# Patient Record
Sex: FEMALE | Race: WHITE | NOT HISPANIC OR LATINO | Employment: FULL TIME | URBAN - METROPOLITAN AREA
[De-identification: names, ages, dates, MRNs, and addresses within clinical notes are randomized per-mention and may not be internally consistent; named-entity substitution may affect disease eponyms.]

---

## 2018-08-27 ENCOUNTER — HOSPITAL ENCOUNTER (EMERGENCY)
Facility: HOSPITAL | Age: 38
Discharge: HOME | End: 2018-08-27
Attending: EMERGENCY MEDICINE
Payer: COMMERCIAL

## 2018-08-27 VITALS
BODY MASS INDEX: 53.92 KG/M2 | TEMPERATURE: 99 F | HEART RATE: 95 BPM | RESPIRATION RATE: 18 BRPM | WEIGHT: 293 LBS | DIASTOLIC BLOOD PRESSURE: 77 MMHG | SYSTOLIC BLOOD PRESSURE: 183 MMHG | OXYGEN SATURATION: 96 % | HEIGHT: 62 IN

## 2018-08-27 DIAGNOSIS — R10.9 ABDOMINAL PAIN, UNSPECIFIED ABDOMINAL LOCATION: Primary | ICD-10-CM

## 2018-08-27 LAB
ALBUMIN SERPL BCP-MCNC: 3.9 G/DL
ALP SERPL-CCNC: 119 U/L
ALT SERPL W/O P-5'-P-CCNC: 33 U/L
ANION GAP SERPL CALC-SCNC: 13 MMOL/L
AST SERPL-CCNC: 24 U/L
B-HCG UR QL: NEGATIVE
BACTERIA #/AREA URNS AUTO: ABNORMAL /HPF
BASOPHILS # BLD AUTO: 0.07 K/UL
BASOPHILS NFR BLD: 0.5 %
BILIRUB SERPL-MCNC: 0.4 MG/DL
BILIRUB UR QL STRIP: NEGATIVE
BUN SERPL-MCNC: 8 MG/DL (ref 6–30)
BUN SERPL-MCNC: 9 MG/DL
CALCIUM SERPL-MCNC: 9.5 MG/DL
CHLORIDE SERPL-SCNC: 101 MMOL/L (ref 95–110)
CHLORIDE SERPL-SCNC: 102 MMOL/L
CLARITY UR REFRACT.AUTO: CLEAR
CO2 SERPL-SCNC: 23 MMOL/L
COLOR UR AUTO: YELLOW
CREAT SERPL-MCNC: 0.5 MG/DL (ref 0.5–1.4)
CREAT SERPL-MCNC: 0.7 MG/DL
CTP QC/QA: YES
DIFFERENTIAL METHOD: ABNORMAL
EOSINOPHIL # BLD AUTO: 0.2 K/UL
EOSINOPHIL NFR BLD: 1.1 %
ERYTHROCYTE [DISTWIDTH] IN BLOOD BY AUTOMATED COUNT: 17.1 %
EST. GFR  (AFRICAN AMERICAN): >60 ML/MIN/1.73 M^2
EST. GFR  (NON AFRICAN AMERICAN): >60 ML/MIN/1.73 M^2
GLUCOSE SERPL-MCNC: 163 MG/DL
GLUCOSE SERPL-MCNC: 171 MG/DL (ref 70–110)
GLUCOSE UR QL STRIP: ABNORMAL
HCT VFR BLD AUTO: 45.4 %
HCT VFR BLD CALC: 46 %PCV (ref 36–54)
HGB BLD-MCNC: 14.8 G/DL
HGB UR QL STRIP: ABNORMAL
IMM GRANULOCYTES # BLD AUTO: 0.06 K/UL
IMM GRANULOCYTES NFR BLD AUTO: 0.4 %
KETONES UR QL STRIP: ABNORMAL
LEUKOCYTE ESTERASE UR QL STRIP: NEGATIVE
LIPASE SERPL-CCNC: 20 U/L
LYMPHOCYTES # BLD AUTO: 2.1 K/UL
LYMPHOCYTES NFR BLD: 14.6 %
MCH RBC QN AUTO: 26.2 PG
MCHC RBC AUTO-ENTMCNC: 32.6 G/DL
MCV RBC AUTO: 80 FL
MICROSCOPIC COMMENT: ABNORMAL
MONOCYTES # BLD AUTO: 0.7 K/UL
MONOCYTES NFR BLD: 4.8 %
NEUTROPHILS # BLD AUTO: 11.5 K/UL
NEUTROPHILS NFR BLD: 78.6 %
NITRITE UR QL STRIP: NEGATIVE
NRBC BLD-RTO: 0 /100 WBC
PH UR STRIP: 5 [PH] (ref 5–8)
PLATELET # BLD AUTO: 314 K/UL
PMV BLD AUTO: 11.4 FL
POC IONIZED CALCIUM: 1.07 MMOL/L (ref 1.06–1.42)
POC TCO2 (MEASURED): 25 MMOL/L (ref 23–29)
POTASSIUM BLD-SCNC: 3.9 MMOL/L (ref 3.5–5.1)
POTASSIUM SERPL-SCNC: 3.9 MMOL/L
PROT SERPL-MCNC: 8 G/DL
PROT UR QL STRIP: NEGATIVE
RBC # BLD AUTO: 5.65 M/UL
RBC #/AREA URNS AUTO: 8 /HPF (ref 0–4)
SAMPLE: ABNORMAL
SODIUM BLD-SCNC: 138 MMOL/L (ref 136–145)
SODIUM SERPL-SCNC: 138 MMOL/L
SP GR UR STRIP: 1.03 (ref 1–1.03)
SQUAMOUS #/AREA URNS AUTO: 1 /HPF
URN SPEC COLLECT METH UR: ABNORMAL
UROBILINOGEN UR STRIP-ACNC: NEGATIVE EU/DL
WBC # BLD AUTO: 14.65 K/UL
WBC #/AREA URNS AUTO: 1 /HPF (ref 0–5)
YEAST UR QL AUTO: ABNORMAL

## 2018-08-27 PROCEDURE — 83690 ASSAY OF LIPASE: CPT

## 2018-08-27 PROCEDURE — 25000003 PHARM REV CODE 250: Performed by: EMERGENCY MEDICINE

## 2018-08-27 PROCEDURE — 25000003 PHARM REV CODE 250: Performed by: PHYSICIAN ASSISTANT

## 2018-08-27 PROCEDURE — 81001 URINALYSIS AUTO W/SCOPE: CPT

## 2018-08-27 PROCEDURE — 99284 EMERGENCY DEPT VISIT MOD MDM: CPT | Mod: ,,, | Performed by: EMERGENCY MEDICINE

## 2018-08-27 PROCEDURE — 80053 COMPREHEN METABOLIC PANEL: CPT

## 2018-08-27 PROCEDURE — 63600175 PHARM REV CODE 636 W HCPCS: Performed by: PHYSICIAN ASSISTANT

## 2018-08-27 PROCEDURE — 81025 URINE PREGNANCY TEST: CPT | Performed by: PHYSICIAN ASSISTANT

## 2018-08-27 PROCEDURE — 96372 THER/PROPH/DIAG INJ SC/IM: CPT | Mod: 59

## 2018-08-27 PROCEDURE — 99284 EMERGENCY DEPT VISIT MOD MDM: CPT | Mod: 25

## 2018-08-27 PROCEDURE — 96374 THER/PROPH/DIAG INJ IV PUSH: CPT

## 2018-08-27 PROCEDURE — 85025 COMPLETE CBC W/AUTO DIFF WBC: CPT

## 2018-08-27 PROCEDURE — 96361 HYDRATE IV INFUSION ADD-ON: CPT

## 2018-08-27 RX ORDER — DICYCLOMINE HYDROCHLORIDE 20 MG/1
20 TABLET ORAL 2 TIMES DAILY
Qty: 20 TABLET | Refills: 0 | Status: SHIPPED | OUTPATIENT
Start: 2018-08-27 | End: 2018-09-26

## 2018-08-27 RX ORDER — ONDANSETRON 4 MG/1
4 TABLET, ORALLY DISINTEGRATING ORAL
Status: COMPLETED | OUTPATIENT
Start: 2018-08-27 | End: 2018-08-27

## 2018-08-27 RX ORDER — DICYCLOMINE HYDROCHLORIDE 10 MG/ML
20 INJECTION INTRAMUSCULAR
Status: COMPLETED | OUTPATIENT
Start: 2018-08-27 | End: 2018-08-27

## 2018-08-27 RX ORDER — ONDANSETRON 4 MG/1
4 TABLET, FILM COATED ORAL EVERY 6 HOURS
Qty: 12 TABLET | Refills: 0 | Status: SHIPPED | OUTPATIENT
Start: 2018-08-27 | End: 2020-03-23

## 2018-08-27 RX ORDER — KETOROLAC TROMETHAMINE 30 MG/ML
15 INJECTION, SOLUTION INTRAMUSCULAR; INTRAVENOUS
Status: COMPLETED | OUTPATIENT
Start: 2018-08-27 | End: 2018-08-27

## 2018-08-27 RX ADMIN — SODIUM CHLORIDE 1000 ML: 0.9 INJECTION, SOLUTION INTRAVENOUS at 01:08

## 2018-08-27 RX ADMIN — ONDANSETRON 4 MG: 4 TABLET, ORALLY DISINTEGRATING ORAL at 01:08

## 2018-08-27 RX ADMIN — KETOROLAC TROMETHAMINE 15 MG: 30 INJECTION, SOLUTION INTRAMUSCULAR at 02:08

## 2018-08-27 RX ADMIN — DICYCLOMINE HYDROCHLORIDE 20 MG: 20 INJECTION, SOLUTION INTRAMUSCULAR at 01:08

## 2018-08-27 RX ADMIN — ALUMINUM HYDROXIDE, MAGNESIUM HYDROXIDE, AND SIMETHICONE 50 ML: 200; 200; 20 SUSPENSION ORAL at 04:08

## 2018-08-27 RX ADMIN — ONDANSETRON 4 MG: 4 TABLET, ORALLY DISINTEGRATING ORAL at 04:08

## 2018-08-27 NOTE — ED TRIAGE NOTES
Sanjana Queen, a 38 y.o. female presents to the ED w/ complaint of upper abdominal pain and midline lower abdominal pain that started Thursday and has progressively gotten worse. States pain feels like gas pain and now is in her back. + nausea, one episode of emesis on Friday, none since. States she's had back and forth constipation vs diarrhea.     Triage note:  Chief Complaint   Patient presents with    Nausea      since thurday with diarrhea and abdominal pain that radiates to her back - similar to gas pain .  reports e. coli infection in stool x 2 weeks ago finished course of abx      Review of patient's allergies indicates:   Allergen Reactions    Doxycycline Anaphylaxis    Augmentin [amoxicillin-pot clavulanate] Other (See Comments)    Iodine and iodide containing products Itching    Sulfa (sulfonamide antibiotics) Nausea And Vomiting     Past Medical History:   Diagnosis Date    Anemia     Asthma     Cardiomyopathy, peripartum     GERD (gastroesophageal reflux disease)     Hypertension     Hypothyroidism     Morbid obesity     Snoring      Pt identifiers Sanjana Queen checked and correct  LOC: The patient is awake, alert, aware of environment with an appropriate affect. Oriented x3, speaking appropriately  APPEARANCE: Pt resting comfortably, in no acute distress, pt is clean and well groomed, clothing properly fastened  SKIN: Skin warm, dry and intact, normal skin turgor, moist mucus membranes  RESPIRATORY: Airway is open and patent, respirations are spontaneous, even and unlabored, normal effort and rate  CARDIAC: Normal rate and rhythm, no peripheral edema noted, capillary refill < 3 seconds, bilateral radial pulses 2+  ABDOMEN: Soft, nondistended. + diffuse tenderness

## 2018-08-27 NOTE — ED PROVIDER NOTES
Encounter Date: 8/27/2018       History     Chief Complaint   Patient presents with    Nausea      since thurday with diarrhea and abdominal pain that radiates to her back - similar to gas pain .  reports e. coli infection in stool x 2 weeks ago finished course of abx      Patient is an obese 38-year-old female with a history of hypertension, diabetes, GERD, anemia is presenting to the ER for evaluation of abdominal pain.  Patient states that since Thursday she has developed abdominal cramping in the upper abdomen.  She states that she has had nausea intermittently as well.  She had 1 episode of vomiting on Friday.  Patient states that she has had diarrhea an alternate constipation for the last 4 days.  No blood in stool black tarry stool at this time.  Patient states that she was recently treated with metronidazole and ciprofloxacin for bloody diarrhea.  She denies recent colonoscopy or endoscopy.  No prior abdominal surgeries otherwise.  Denies any UTI symptoms with dysuria hematuria.  Denies fevers or chills at home.  Patient denies recent hospitalization or sick contacts.  No prior history of C diff.      The history is provided by the patient.     Review of patient's allergies indicates:   Allergen Reactions    Doxycycline Anaphylaxis    Augmentin [amoxicillin-pot clavulanate] Other (See Comments)    Iodine and iodide containing products Itching    Sulfa (sulfonamide antibiotics) Nausea And Vomiting    Sulfamethoxazole-trimethoprim      Other reaction(s): nausea     Past Medical History:   Diagnosis Date    Anemia     Asthma     Cardiomyopathy, peripartum     GERD (gastroesophageal reflux disease)     Hypertension     Hypothyroidism     not at present    Morbid obesity     Snoring      Past Surgical History:   Procedure Laterality Date    BLADDER SURGERY      DILATION AND CURETTAGE OF UTERUS      FRACTURE SURGERY      spleen surgery      TONSILLECTOMY      TUBAL LIGATION       Family History    Problem Relation Age of Onset    Diabetes Mother     Diabetes Father     Hypertension Father     Heart disease Father     Hypertension Maternal Grandmother     Cancer Maternal Grandmother     Hypertension Maternal Grandfather     Heart disease Maternal Grandfather     Hypertension Sister      Social History     Tobacco Use    Smoking status: Current Every Day Smoker     Packs/day: 1.00     Start date: 3/1/1998    Smokeless tobacco: Never Used   Substance Use Topics    Alcohol use: No    Drug use: No     Review of Systems   Constitutional: Negative for chills and fever.   HENT: Negative for congestion.    Respiratory: Negative for cough and shortness of breath.    Cardiovascular: Negative for chest pain and palpitations.   Gastrointestinal: Positive for abdominal pain, constipation, diarrhea, nausea and vomiting. Negative for abdominal distention and blood in stool.   Genitourinary: Negative for dysuria, flank pain, hematuria, vaginal bleeding and vaginal discharge.   Musculoskeletal: Negative for back pain.   Skin: Negative for wound.   Allergic/Immunologic: Negative for immunocompromised state.   Neurological: Negative for dizziness, weakness and light-headedness.   Hematological: Does not bruise/bleed easily.   Psychiatric/Behavioral: Negative for confusion.       Physical Exam     Initial Vitals [08/27/18 0029]   BP Pulse Resp Temp SpO2   (!) 183/77 95 18 98.7 °F (37.1 °C) 96 %      MAP       --         Physical Exam    Vitals reviewed.  Constitutional: Vital signs are normal. She appears well-developed and well-nourished. She is not diaphoretic. No distress.   HENT:   Head: Normocephalic and atraumatic.   Mouth/Throat: Oropharynx is clear and moist.   Eyes: Conjunctivae and EOM are normal. Pupils are equal, round, and reactive to light.   Cardiovascular: Normal rate, regular rhythm, normal heart sounds and intact distal pulses.   Pulmonary/Chest: Breath sounds normal.   Abdominal: Soft. Normal  appearance. She exhibits distension. There is tenderness in the epigastric area and left upper quadrant. There is rigidity, rebound, guarding and CVA tenderness.   Morbidly obese abdomen.  Exam limited due to body habitus   Neurological: She is alert and oriented to person, place, and time.   Skin: Skin is warm and dry.         ED Course   Procedures  Labs Reviewed   ISTAT PROCEDURE - Abnormal; Notable for the following components:       Result Value    POC Glucose 171 (*)     All other components within normal limits   POCT URINE PREGNANCY - Normal   CBC W/ AUTO DIFFERENTIAL   COMPREHENSIVE METABOLIC PANEL   LIPASE   URINALYSIS, REFLEX TO URINE CULTURE   ISTAT CHEM8          Imaging Results          CT Abdomen Pelvis  Without Contrast (In process)                        APC / Resident Notes:   Patient seen in the ER promptly upon arrival. She is afebrile, no acute distress.  Physical examination revealed tenderness on palpation over the epigastric and left upper quadrant.  Abdomen soft, nondistended.  Limited examination due to body habitus.  Patient given Bentyl, Toradol and Zofran.      Laboratory studies show leukocytosis of 14,000.   Urinalysis does not show evidence of infection or blood.    CT of abdomen pelvis obtained, pending results.      Update -   Pt was signed out to me as requiring CT results. CT shows no signs of acute intraabdominal abnormality. Pt continued to complain of diffuse abdominal pain. Have given another dose of zofran, and a GI cocktail. She feels improved after the cocktail. Will discharge with zofran, bentyl, PCP follow up and instructions to make an appointment with GI. Have also given return to ED precautions. Pt agrees with the plan. Will discharge.   Esequiel Watkins PGY-4 [facility]  08/27/2018 4:50 AM           Attending Attestation:   Physician Attestation Statement for Resident:  As the supervising MD . -: PATIENT WAS NOT SEEN BY ME OR PRESENTED BY RESIDENT                       Clinical Impression:   The encounter diagnosis was Abdominal pain, unspecified abdominal location.                             Esequiel Watkins MD  Resident  08/27/18 0509       Sergei Monzon DO  08/28/18 9080

## 2018-10-24 ENCOUNTER — CLINICAL SUPPORT (OUTPATIENT)
Dept: URGENT CARE | Facility: CLINIC | Age: 38
End: 2018-10-24
Payer: COMMERCIAL

## 2018-10-24 VITALS
WEIGHT: 293 LBS | HEIGHT: 62 IN | SYSTOLIC BLOOD PRESSURE: 139 MMHG | RESPIRATION RATE: 18 BRPM | TEMPERATURE: 99 F | HEART RATE: 98 BPM | BODY MASS INDEX: 53.92 KG/M2 | DIASTOLIC BLOOD PRESSURE: 90 MMHG | OXYGEN SATURATION: 98 %

## 2018-10-24 DIAGNOSIS — H57.9 ITCHY EYES: Primary | ICD-10-CM

## 2018-10-24 PROCEDURE — 99203 OFFICE O/P NEW LOW 30 MIN: CPT | Mod: S$GLB,,, | Performed by: PHYSICIAN ASSISTANT

## 2018-10-24 RX ORDER — ASPIRIN 81 MG/1
1 TABLET ORAL DAILY
COMMUNITY
End: 2019-09-30

## 2018-10-24 RX ORDER — AMLODIPINE BESYLATE 2.5 MG/1
1 TABLET ORAL DAILY
COMMUNITY
End: 2019-01-14

## 2018-10-24 RX ORDER — IRBESARTAN 300 MG/1
1 TABLET ORAL DAILY
Refills: 0 | COMMUNITY
Start: 2018-07-23 | End: 2019-01-14

## 2018-10-24 RX ORDER — GLIPIZIDE 10 MG/1
1 TABLET, FILM COATED, EXTENDED RELEASE ORAL DAILY
COMMUNITY
End: 2019-01-14

## 2018-10-24 RX ORDER — PANTOPRAZOLE SODIUM 40 MG/1
1 TABLET, DELAYED RELEASE ORAL DAILY
COMMUNITY
End: 2019-11-18 | Stop reason: SDUPTHER

## 2018-10-24 RX ORDER — OLOPATADINE HYDROCHLORIDE 2 MG/ML
1 SOLUTION/ DROPS OPHTHALMIC DAILY
Qty: 1 BOTTLE | Refills: 0 | Status: SHIPPED | OUTPATIENT
Start: 2018-10-24 | End: 2020-05-15

## 2018-10-24 RX ORDER — ROSUVASTATIN CALCIUM 5 MG/1
TABLET, COATED ORAL
COMMUNITY
End: 2019-11-18 | Stop reason: SDUPTHER

## 2018-10-24 RX ORDER — FUROSEMIDE 40 MG/1
1 TABLET ORAL DAILY
COMMUNITY
End: 2019-01-14

## 2018-10-24 NOTE — PROGRESS NOTES
"Subjective:       Patient ID: Sanjana Queen is a 38 y.o. female.    Vitals:  height is 5' 2" (1.575 m) and weight is 145.2 kg (320 lb) (abnormal). Her oral temperature is 98.8 °F (37.1 °C). Her blood pressure is 139/90 (abnormal) and her pulse is 98. Her respiration is 18 and oxygen saturation is 98%.     Chief Complaint: Eye Problem    This is a 38 y.o. female who presents today with a chief complaint of eyes itching.  She suffers from Rosecha in the eye lid and dry eyes. She has tried using eye drops and wash with no relief.      Eye Problem    Both eyes are affected.This is a new problem. The current episode started more than 1 month ago. The problem occurs intermittently. The problem has been unchanged. There was no injury mechanism. The pain is at a severity of 0/10. The patient is experiencing no pain. There is no known exposure to pink eye. She does not wear contacts. Pertinent negatives include no blurred vision, fever, nausea, photophobia or vomiting. She has tried eye drops, commercial eye wash and water for the symptoms. The treatment provided no relief.     Review of Systems   Constitution: Negative for chills and fever.   HENT: Positive for congestion. Negative for sore throat.    Eyes: Negative for blurred vision and photophobia.        Itching to bilateral eyes   Cardiovascular: Negative for chest pain.   Respiratory: Negative for shortness of breath.    Skin: Negative for rash.   Musculoskeletal: Negative for joint pain.   Gastrointestinal: Negative for abdominal pain, diarrhea, nausea and vomiting.   Genitourinary: Negative for dysuria.   Neurological: Negative for dizziness, focal weakness and headaches.   Psychiatric/Behavioral: Negative for depression.       Objective:      Physical Exam   Constitutional: She is oriented to person, place, and time. She appears well-developed and well-nourished.   HENT:   Head: Normocephalic and atraumatic.   Right Ear: External ear normal.   Left Ear: External " ear normal.   Nose: Nose normal.   Mouth/Throat: Oropharynx is clear and moist.   Eyes: Conjunctivae, EOM and lids are normal. Pupils are equal, round, and reactive to light. Right eye exhibits no discharge. Left eye exhibits no discharge. Right conjunctiva is not injected. Left conjunctiva is not injected.   Neck: Normal range of motion. Neck supple. No thyromegaly present.   Cardiovascular: Normal rate and regular rhythm. Exam reveals no gallop and no friction rub.   No murmur heard.  Pulmonary/Chest: Effort normal and breath sounds normal. She has no wheezes. She has no rales.   Musculoskeletal: Normal range of motion.   Lymphadenopathy:     She has no cervical adenopathy.   Neurological: She is alert and oriented to person, place, and time.   Skin: Skin is warm and dry. No rash noted. No erythema.   Psychiatric: She has a normal mood and affect. Her behavior is normal. Judgment and thought content normal.   Nursing note and vitals reviewed.      Assessment:       1. Itchy eyes        Plan:         Itchy eyes  -     olopatadine (PATADAY) 0.2 % Drop; Place 1 drop into both eyes once daily.  Dispense: 1 Bottle; Refill: 0        Sanjana was seen today for eye problem.    Diagnoses and all orders for this visit:    Itchy eyes  -     olopatadine (PATADAY) 0.2 % Drop; Place 1 drop into both eyes once daily.      Patient Instructions   - Rest.    - Drink plenty of fluids.    - Tylenol or Ibuprofen as directed as needed for fever/pain.    - Follow up with your PCP or specialty clinic as directed in the next 1-2 weeks if not improved or as needed.  You can call [phone] to schedule an appointment with the appropriate provider.    - Go to the ED if your symptoms worsen.  - You must understand that you have received an Urgent Care treatment only and that you may be released before all of your medical problems are known or treated.   - You, the patient, will arrange for follow up care as instructed.   - If your  condition worsens or fails to improve we recommend that you receive another evaluation at the ER immediately or contact your PCP to discuss your concerns or return here.      Allergic Rhinitis  Allergic rhinitis is an allergic reaction that affects the nose, and often the eyes. Its often known as nasal allergies. Nasal allergies are often due to things in the environment that are breathed in. Depending what you are sensitive to, nasal allergies may occur only during certain seasons. Or they may occur year round. Common indoor allergens include house dust mites, mold, cockroaches, and pet dander. Outdoor allergens include pollen from trees, grasses, and weeds.   Symptoms include a drippy, stuffy, and itchy nose. They also include sneezing and red and itchy eyes. You may feel tired more often. Severe allergies may also affect your breathing and trigger a condition called asthma.   Tests can be done to see what allergens are affecting you. You may be referred to an allergy specialist for testing and further evaluation.  Home care  Your healthcare provider may prescribe medicines to help relieve allergy symptoms. These may include oral medicines, nasal sprays, or eye drops.  Ask your provider for advice on how to avoid substances that you are allergic to. Below are a few tips for each type of allergen.  Pet dander:  · Do not have pets with fur and feathers.  · If you can't avoid having a pet, keep it out of your bedroom and off upholstered furniture.  Pollen:  · When pollen counts are high, keep windows of your car and home closed. If possible, use an air conditioner instead.  · Wear a filter mask when mowing or doing yard work.  House dust mites:  · Wash bedding every week in warm water and detergent and dry on a hot setting.  · Cover the mattress, box spring, and pillows with allergy covers.   · If possible, sleep in a room with no carpet, curtains, or upholstered furniture.  Cockroaches:  · Store food in sealed  containers.  · Remove garbage from the home promptly.  · Fix water leaks  Mold:  · Keep humidity low by using a dehumidifier or air conditioner. Keep the dehumidifier and air conditioner clean and free of mold.  · Clean moldy areas with bleach and water.  In general:  · Vacuum once or twice a week. If possible, use a vacuum with a high-efficiency particulate air (HEPA) filter.  · Do not smoke. Avoid cigarette smoke. Cigarette smoke is an irritant that can make symptoms worse.  Follow-up care  Follow up as advised by the healthcare provider or our staff. If you were referred to an allergy specialist, make this appointment promptly.  When to seek medical advice  Call your healthcare provider right away if the following occur:  · Coughing or wheezing  · Fever greater than 100.4°F (38°C)  · Hives (raised red bumps)  · Continuing symptoms, new symptoms, or worsening symptoms  Call 911 right away if you have:  · Trouble breathing  · Severe swelling of the face or severe itching of the eyes or mouth  Date Last Reviewed: 3/1/2017  © 9838-1889 [employer]. [address] [place] [address]. All rights reserved. This information is not intended as a substitute for professional medical care. Always follow your healthcare professional's instructions.        Nasal Allergies: Related Problems  Allergies can cause nasal passages to swell. This narrows the air passages. Allergies also cause increased mucus production in the nose. These changes result in nasal allergy symptoms. Common symptoms include itching, sneezing, stuffy nose, and runny nose. Nasal allergies can also cause problems in other parts of the respiratory system. Some of the more common problems are discussed below. If you think you have any of these problems, talk to your healthcare provider about treatment choices.    Sinus infections  Fluid may be trapped in the sinuses. Bacteria may grow in trapped fluid. This causes sinus infection  (sinusitis).  Conjunctivitis  Allergens irritate your eyes, including the lining of the conjunctiva. This causes eyes to become red, itchy, puffy, and watery.  Ear problems  The eustachian tube connects the middle ear to nasal passages.  Allergies can block this tube, and make the ears feel plugged. Fluid may also build up, leading to an ear infection (otitis media).  Nasal polyps  Allergies cause nasal passages to swell. Constant swelling can lead to formation of a sac called a polyp. Polyps can grow large enough to block nasal passages.  Asthma  Asthma is inflammation and swelling of the air passages in the lungs. The symptoms are wheezing, shortness of breath, coughing, and chest tightness. Allergies, including nasal allergies, are common in people with asthma.  Date Last Reviewed: 9/1/2016  © 6760-1056 [employer]. [address] [place] [address], [place] [address]. All rights reserved. This information is not intended as a substitute for professional medical care. Always follow your healthcare professional's instructions.

## 2019-01-14 ENCOUNTER — OFFICE VISIT (OUTPATIENT)
Dept: URGENT CARE | Facility: CLINIC | Age: 39
End: 2019-01-14
Payer: COMMERCIAL

## 2019-01-14 VITALS
TEMPERATURE: 98 F | DIASTOLIC BLOOD PRESSURE: 91 MMHG | SYSTOLIC BLOOD PRESSURE: 167 MMHG | HEART RATE: 82 BPM | BODY MASS INDEX: 51.91 KG/M2 | RESPIRATION RATE: 18 BRPM | OXYGEN SATURATION: 97 % | WEIGHT: 293 LBS | HEIGHT: 63 IN

## 2019-01-14 DIAGNOSIS — M43.6 TORTICOLLIS: Primary | ICD-10-CM

## 2019-01-14 PROCEDURE — 3080F PR MOST RECENT DIASTOLIC BLOOD PRESSURE >= 90 MM HG: ICD-10-PCS | Mod: CPTII,S$GLB,, | Performed by: FAMILY MEDICINE

## 2019-01-14 PROCEDURE — 96372 THER/PROPH/DIAG INJ SC/IM: CPT | Mod: S$GLB,,, | Performed by: FAMILY MEDICINE

## 2019-01-14 PROCEDURE — 3077F PR MOST RECENT SYSTOLIC BLOOD PRESSURE >= 140 MM HG: ICD-10-PCS | Mod: CPTII,S$GLB,, | Performed by: FAMILY MEDICINE

## 2019-01-14 PROCEDURE — 3008F BODY MASS INDEX DOCD: CPT | Mod: CPTII,S$GLB,, | Performed by: FAMILY MEDICINE

## 2019-01-14 PROCEDURE — 99214 OFFICE O/P EST MOD 30 MIN: CPT | Mod: 25,S$GLB,, | Performed by: FAMILY MEDICINE

## 2019-01-14 PROCEDURE — 3080F DIAST BP >= 90 MM HG: CPT | Mod: CPTII,S$GLB,, | Performed by: FAMILY MEDICINE

## 2019-01-14 PROCEDURE — 3077F SYST BP >= 140 MM HG: CPT | Mod: CPTII,S$GLB,, | Performed by: FAMILY MEDICINE

## 2019-01-14 PROCEDURE — 3008F PR BODY MASS INDEX (BMI) DOCUMENTED: ICD-10-PCS | Mod: CPTII,S$GLB,, | Performed by: FAMILY MEDICINE

## 2019-01-14 PROCEDURE — 99214 PR OFFICE/OUTPT VISIT, EST, LEVL IV, 30-39 MIN: ICD-10-PCS | Mod: 25,S$GLB,, | Performed by: FAMILY MEDICINE

## 2019-01-14 PROCEDURE — 96372 PR INJECTION,THERAP/PROPH/DIAG2ST, IM OR SUBCUT: ICD-10-PCS | Mod: S$GLB,,, | Performed by: FAMILY MEDICINE

## 2019-01-14 RX ORDER — TRIAMCINOLONE ACETONIDE 1 MG/G
CREAM TOPICAL
Refills: 0 | COMMUNITY
Start: 2018-11-29

## 2019-01-14 RX ORDER — HYOSCYAMINE SULFATE 0.12 MG/1
TABLET SUBLINGUAL
Refills: 2 | COMMUNITY
Start: 2018-12-26 | End: 2020-03-23

## 2019-01-14 RX ORDER — KETOROLAC TROMETHAMINE 30 MG/ML
30 INJECTION, SOLUTION INTRAMUSCULAR; INTRAVENOUS
Status: COMPLETED | OUTPATIENT
Start: 2019-01-14 | End: 2019-01-14

## 2019-01-14 RX ORDER — HYDROCODONE BITARTRATE AND ACETAMINOPHEN 7.5; 325 MG/1; MG/1
1 TABLET ORAL EVERY 6 HOURS PRN
Refills: 0 | COMMUNITY
Start: 2019-01-04 | End: 2019-01-14

## 2019-01-14 RX ORDER — FLUCONAZOLE 150 MG/1
1 TABLET ORAL DAILY
Refills: 3 | COMMUNITY
Start: 2018-11-30 | End: 2019-01-14

## 2019-01-14 RX ORDER — CYCLOBENZAPRINE HCL 10 MG
10 TABLET ORAL 3 TIMES DAILY PRN
Qty: 30 TABLET | Refills: 0 | Status: SHIPPED | OUTPATIENT
Start: 2019-01-14 | End: 2019-01-16 | Stop reason: ALTCHOICE

## 2019-01-14 RX ADMIN — KETOROLAC TROMETHAMINE 30 MG: 30 INJECTION, SOLUTION INTRAMUSCULAR; INTRAVENOUS at 05:01

## 2019-01-14 NOTE — PATIENT INSTRUCTIONS
Torticollis (Wry Neck)  Torticollis happens when muscles on one side of the neck contract (tighten). This causes the neck to twist or tilt to the side. The muscles may also be quite sore. It affects mainly children and young adults, often appearing overnight. It can also affect infants who develop or are born with tight neck muscles on one side.  What causes torticollis?  Causes of torticollis include:  · Congenital (present at birth). Injury to the neck muscles from an accident or other trauma, or even just sleeping in an unusual position  · Side effect of certain medicines or drugs  · Problems with the bones of the neck (which can happen after an infection or injury)  · Spasm of the muscles due to an infection, such as an abscess in the neck  When to go to the emergency room (ER)  All neck problems should be checked by a healthcare provider within 24 hours. Seek emergency care if you can't reach your healthcare provider or these symptoms are present:  · Trouble breathing or swallowing or in smaller children, continuous drooling  · Numbness or weakness in the arms and legs  · Trouble walking or speaking  · Fever  What to expect in the ER  The neck will be examined, and questions about any current or former medical problems will be asked. X-rays of the neck may be taken to check for broken bones.  Treatment  The goal in treating torticollis is to relax the neck muscles. The best approach will depend on the cause of the problem. In most cases, one or more of the following may be given:  · Medicines to help relax the muscles and reduce swelling  · Hot and cold compresses to help ease muscle tightness  · Botulinum toxin injections to prevent further muscle spasms  · Physical therapy to help stretch and relax the muscles  · Treatment of any infection, which may need intravenous antibiotics or surgery  Follow-up  Depending on the cause, torticollis often goes away on its own. Follow up with your healthcare provider as  instructed. If symptoms become worse, call your healthcare provider or return to the ER.  Date Last Reviewed: 9/30/2015  © 2844-2947 [employer]. [address] [place] [address], [place] [address]. All rights reserved. This information is not intended as a substitute for professional medical care. Always follow your healthcare professional's instructions.

## 2019-01-14 NOTE — PROGRESS NOTES
"Subjective:       Patient ID: Sanjana Queen is a 38 y.o. female.    Vitals:  height is 5' 3" (1.6 m) and weight is 136.1 kg (300 lb). Her oral temperature is 98.2 °F (36.8 °C). Her blood pressure is 167/91 (abnormal) and her pulse is 82. Her respiration is 18 and oxygen saturation is 97%.     Chief Complaint: Neck Pain    This is a 38 y.o. female who presents today with a chief complaint of   Left side neck stiffness since friday      Neck Pain    This is a new problem. The current episode started in the past 7 days. The problem occurs constantly. The problem has been unchanged. The pain is associated with a sleep position. The pain is present in the left side. The quality of the pain is described as aching. The pain is at a severity of 8/10. The pain is severe. The symptoms are aggravated by position. The pain is same all the time. Stiffness is present all day. Pertinent negatives include no chest pain, headaches or weakness. She has tried NSAIDs for the symptoms. The treatment provided no relief.       Constitution: Negative for chills and fatigue.   HENT: Negative for congestion and sore throat.    Neck: Positive for neck pain and neck stiffness. Negative for painful lymph nodes.   Cardiovascular: Negative for chest pain and leg swelling.   Eyes: Negative for double vision and blurred vision.   Respiratory: Negative for cough and shortness of breath.    Gastrointestinal: Negative for nausea, vomiting and diarrhea.   Genitourinary: Negative for dysuria, frequency, urgency and history of kidney stones.   Musculoskeletal: Positive for pain. Negative for joint pain, joint swelling, muscle cramps and muscle ache.   Skin: Negative for color change, pale, rash and bruising.   Allergic/Immunologic: Negative for seasonal allergies.   Neurological: Negative for dizziness, history of vertigo, light-headedness, passing out and headaches.   Hematologic/Lymphatic: Negative for swollen lymph nodes.   Psychiatric/Behavioral: " Negative for nervous/anxious, sleep disturbance and depression. The patient is not nervous/anxious.        Objective:      Physical Exam   Constitutional: She appears well-developed and well-nourished.   Eyes: EOM are normal. Pupils are equal, round, and reactive to light.   Neck: Muscular tenderness (left trapezius) present. No spinous process tenderness present. Neck rigidity present. Decreased range of motion present.   Cardiovascular: Normal rate and regular rhythm.   Pulmonary/Chest: Effort normal and breath sounds normal.   Nursing note and vitals reviewed.      Assessment:       1. Torticollis        Plan:         Torticollis  -     ketorolac injection 30 mg  -     cyclobenzaprine (FLEXERIL) 10 MG tablet; Take 1 tablet (10 mg total) by mouth 3 (three) times daily as needed for Muscle spasms.  Dispense: 30 tablet; Refill: 0    Heat and topical menthol rubs

## 2019-01-16 ENCOUNTER — CLINICAL SUPPORT (OUTPATIENT)
Dept: URGENT CARE | Facility: CLINIC | Age: 39
End: 2019-01-16
Payer: COMMERCIAL

## 2019-01-16 VITALS
HEART RATE: 95 BPM | SYSTOLIC BLOOD PRESSURE: 177 MMHG | BODY MASS INDEX: 51.91 KG/M2 | HEIGHT: 63 IN | WEIGHT: 293 LBS | OXYGEN SATURATION: 98 % | RESPIRATION RATE: 20 BRPM | TEMPERATURE: 98 F | DIASTOLIC BLOOD PRESSURE: 97 MMHG

## 2019-01-16 DIAGNOSIS — S16.1XXD STRAIN OF NECK MUSCLE, SUBSEQUENT ENCOUNTER: Primary | ICD-10-CM

## 2019-01-16 DIAGNOSIS — M43.6 TORTICOLLIS: ICD-10-CM

## 2019-01-16 PROCEDURE — 99214 PR OFFICE/OUTPT VISIT, EST, LEVL IV, 30-39 MIN: ICD-10-PCS | Mod: S$GLB,,, | Performed by: FAMILY MEDICINE

## 2019-01-16 PROCEDURE — 99214 OFFICE O/P EST MOD 30 MIN: CPT | Mod: S$GLB,,, | Performed by: FAMILY MEDICINE

## 2019-01-16 RX ORDER — TIZANIDINE 4 MG/1
2 TABLET ORAL EVERY 8 HOURS PRN
Qty: 30 TABLET | Refills: 1 | Status: SHIPPED | OUTPATIENT
Start: 2019-01-16 | End: 2019-01-26

## 2019-01-16 RX ORDER — TRAMADOL HYDROCHLORIDE 50 MG/1
TABLET ORAL
Qty: 30 TABLET | Refills: 0 | Status: SHIPPED | OUTPATIENT
Start: 2019-01-16 | End: 2020-03-23

## 2019-01-17 NOTE — PROGRESS NOTES
"Subjective:       Patient ID: Sanjana Queen is a 38 y.o. female.    Vitals:  height is 5' 3" (1.6 m) and weight is 136.1 kg (300 lb). Her tympanic temperature is 98.1 °F (36.7 °C). Her blood pressure is 177/97 (abnormal) and her pulse is 95. Her respiration is 20 and oxygen saturation is 98%.     Chief Complaint: Neck Pain    This is a 38 y.o. female   with Past Medical History:  No date: Anemia  No date: Asthma  No date: Cardiomyopathy, peripartum  No date: GERD (gastroesophageal reflux disease)  No date: Hypertension  No date: Hypothyroidism      Comment:  not at present  No date: Morbid obesity  No date: Snoring   and Past Surgical History:  No date: BLADDER SURGERY  No date: DILATION AND CURETTAGE OF UTERUS  No date: ENDOMETRIAL ABLATION  No date: FRACTURE SURGERY  No date: gastric sleeve; N/A  No date: spleen surgery  No date: TONSILLECTOMY  No date: TUBAL LIGATION  who presents today with a chief complaint of neck pain that began four days ago.  She actually had weight loss surgery as well as hiatal hernia repair on January 3rd, presumably under general anesthesia.  She was here two days ago with the same symptoms of neck pain. She has been trying topical cream as well as a TENS unit.  Local heat helps only temporarily.  She took hydrocodone and flexeril without benefit.  No history of trauma of which she is aware.  No pain radiating down her arms.  No fever or systemic symptoms      Neck Pain    This is a new problem. The current episode started in the past 7 days. The problem occurs constantly. The problem has been rapidly worsening. The pain is associated with an unknown factor. The pain is present in the occipital region, right side, left side and midline. The pain is at a severity of 10/10. The pain is severe. The symptoms are aggravated by swallowing. The pain is same all the time. Stiffness is present all day. Pertinent negatives include no chest pain, fever, headaches or weakness. She has tried oral " narcotics (hydrocodone and flexeril) for the symptoms. The treatment provided no relief.       Constitution: Negative for chills, fatigue and fever.   HENT: Negative for congestion and sore throat.    Neck: Positive for neck pain. Negative for painful lymph nodes.   Cardiovascular: Negative for chest pain and leg swelling.   Eyes: Negative for double vision and blurred vision.   Respiratory: Negative for cough and shortness of breath.    Gastrointestinal: Negative for nausea, vomiting and diarrhea.   Genitourinary: Negative for dysuria, frequency, urgency and history of kidney stones.   Musculoskeletal: Positive for pain. Negative for joint pain, joint swelling, muscle cramps and muscle ache.   Skin: Negative for color change, pale, rash and bruising.   Allergic/Immunologic: Negative for seasonal allergies.   Neurological: Negative for dizziness, history of vertigo, light-headedness, passing out and headaches.   Hematologic/Lymphatic: Negative for swollen lymph nodes.   Psychiatric/Behavioral: Negative for nervous/anxious, sleep disturbance and depression. The patient is not nervous/anxious.        Objective:      Physical Exam   Constitutional: She is oriented to person, place, and time. She appears well-developed and well-nourished. She is cooperative.  Non-toxic appearance. She does not appear ill.   Nontoxic appearance, although clearly uncomfortable.   HENT:   Head: Normocephalic and atraumatic.   Right Ear: Hearing, tympanic membrane, external ear and ear canal normal.   Left Ear: Hearing, tympanic membrane, external ear and ear canal normal.   Nose: Nose normal. No mucosal edema, rhinorrhea or nasal deformity. No epistaxis. Right sinus exhibits no maxillary sinus tenderness and no frontal sinus tenderness. Left sinus exhibits no maxillary sinus tenderness and no frontal sinus tenderness.   Mouth/Throat: Uvula is midline, oropharynx is clear and moist and mucous membranes are normal. No trismus in the jaw.  Normal dentition. No uvula swelling. No posterior oropharyngeal erythema.   TMs clear.  Sinuses nontender.   Eyes: Conjunctivae, EOM and lids are normal. Pupils are equal, round, and reactive to light. Right eye exhibits no discharge. Left eye exhibits no discharge. No scleral icterus.   Sclera clear bilat   Neck: Trachea normal, full passive range of motion without pain and phonation normal.   Nontender over bony spine.  Paracervical muscle tenderness and spasm is palpated, left greater than right.  Motor and sensory exam is within normal limits.  DTRs 2+ and equal.   Cardiovascular: Normal rate, regular rhythm, normal heart sounds, intact distal pulses and normal pulses.   Pulmonary/Chest: Effort normal and breath sounds normal. No stridor. No respiratory distress. She has no wheezes.   Abdominal: Soft. Normal appearance and bowel sounds are normal. She exhibits no distension, no pulsatile midline mass and no mass. There is no tenderness. There is no guarding.   Musculoskeletal: Normal range of motion. She exhibits no edema or deformity.   Neurological: She is alert and oriented to person, place, and time. She exhibits normal muscle tone. Coordination normal.   Skin: Skin is warm, dry and intact. She is not diaphoretic. No pallor.   Psychiatric: She has a normal mood and affect. Her speech is normal and behavior is normal. Judgment and thought content normal. Cognition and memory are normal.   Nursing note and vitals reviewed.      Assessment:       1. Strain of neck muscle, subsequent encounter    2. Torticollis        Plan:         Strain of neck muscle, subsequent encounter  -     tiZANidine (ZANAFLEX) 4 MG tablet; Take 0.5 tablets (2 mg total) by mouth every 8 (eight) hours as needed.  Dispense: 30 tablet; Refill: 1    Torticollis  -     traMADol (ULTRAM) 50 mg tablet; 1-2 every 8 hr as needed  Dispense: 30 tablet; Refill: 0  -     tiZANidine (ZANAFLEX) 4 MG tablet; Take 0.5 tablets (2 mg total) by mouth every  8 (eight) hours as needed.  Dispense: 30 tablet; Refill: 1    DISCONTINUE THE FLEXERIL, AND TRY THE ZANAFLEX INSTEAD.     MAKE AN APPOINTMENT TO SEE YOUR PRIMARY CARE DOCTOR IN 2 DAYS.    IT IS IMPORTANT TO NOT DRIVE OR OPERATE HEAVY MACHINERY AFTER TAKING ANY OF THESE MEDICATIONS, AS THEY MAY MAKE YOU SLEEPY.

## 2019-08-20 ENCOUNTER — TELEPHONE (OUTPATIENT)
Dept: FAMILY MEDICINE | Facility: CLINIC | Age: 39
End: 2019-08-20

## 2019-08-20 NOTE — TELEPHONE ENCOUNTER
Spoke to pt informed we are not going to order blood work until we get records from [facility].   Mailed med rec release

## 2019-08-20 NOTE — TELEPHONE ENCOUNTER
----- Message from Kacy Wise sent at 8/20/2019  2:41 PM CDT -----  Contact: self  Pt called in about wanting to have lab orders ordered. Please out order in and contact pt      Pt can be reached at [phone]        TY

## 2019-09-17 NOTE — TELEPHONE ENCOUNTER
----- Message from Kacy Wise sent at 9/17/2019  4:03 PM CDT -----  Contact: self  Type:  RX Refill Request    Who Called: Pt  Refill or New Rx:refill  RX Name and Strength: SITagliptan-metformin (JANUMET)  mg per tablet  How is the patient currently taking it? (ex. 1XDay): once daily  Is this a 30 day or 90 day RX: 90 day  [facility]  Local or Mail Order:mail  Ordering Provider:  Would the patient rather a call back or a response via [facility]? callback  Best Call Back Number: [phone]  Additional Information: pt needs refill on medication      Pt called in about getting labs done. Pt can come get them done at [facility]

## 2019-09-30 ENCOUNTER — OFFICE VISIT (OUTPATIENT)
Dept: FAMILY MEDICINE | Facility: CLINIC | Age: 39
End: 2019-09-30
Payer: COMMERCIAL

## 2019-09-30 VITALS
HEIGHT: 63 IN | SYSTOLIC BLOOD PRESSURE: 124 MMHG | TEMPERATURE: 99 F | OXYGEN SATURATION: 98 % | WEIGHT: 221.31 LBS | DIASTOLIC BLOOD PRESSURE: 82 MMHG | BODY MASS INDEX: 39.21 KG/M2 | HEART RATE: 104 BPM

## 2019-09-30 DIAGNOSIS — E03.9 ACQUIRED HYPOTHYROIDISM: ICD-10-CM

## 2019-09-30 DIAGNOSIS — N64.4 BREAST PAIN, LEFT: ICD-10-CM

## 2019-09-30 DIAGNOSIS — F33.40 RECURRENT MAJOR DEPRESSIVE DISORDER, IN REMISSION: ICD-10-CM

## 2019-09-30 DIAGNOSIS — E78.49 OTHER HYPERLIPIDEMIA: ICD-10-CM

## 2019-09-30 DIAGNOSIS — E11.9 TYPE 2 DIABETES MELLITUS WITHOUT COMPLICATION, WITHOUT LONG-TERM CURRENT USE OF INSULIN: Primary | ICD-10-CM

## 2019-09-30 DIAGNOSIS — D24.2 FIBROADENOMA OF LEFT BREAST: ICD-10-CM

## 2019-09-30 DIAGNOSIS — R60.9 FLUID RETENTION: ICD-10-CM

## 2019-09-30 DIAGNOSIS — K21.9 GASTROESOPHAGEAL REFLUX DISEASE, ESOPHAGITIS PRESENCE NOT SPECIFIED: ICD-10-CM

## 2019-09-30 DIAGNOSIS — K59.09 OTHER CONSTIPATION: ICD-10-CM

## 2019-09-30 PROCEDURE — 99214 PR OFFICE/OUTPT VISIT, EST, LEVL IV, 30-39 MIN: ICD-10-PCS | Mod: 25,S$GLB,, | Performed by: INTERNAL MEDICINE

## 2019-09-30 PROCEDURE — 90471 FLU VACCINE (QUAD) GREATER THAN OR EQUAL TO 3YO PRESERVATIVE FREE IM: ICD-10-PCS | Mod: S$GLB,,, | Performed by: INTERNAL MEDICINE

## 2019-09-30 PROCEDURE — 99214 OFFICE O/P EST MOD 30 MIN: CPT | Mod: 25,S$GLB,, | Performed by: INTERNAL MEDICINE

## 2019-09-30 PROCEDURE — 99999 PR PBB SHADOW E&M-EST. PATIENT-LVL IV: CPT | Mod: PBBFAC,,, | Performed by: INTERNAL MEDICINE

## 2019-09-30 PROCEDURE — 90686 IIV4 VACC NO PRSV 0.5 ML IM: CPT | Mod: S$GLB,,, | Performed by: INTERNAL MEDICINE

## 2019-09-30 PROCEDURE — 3008F BODY MASS INDEX DOCD: CPT | Mod: CPTII,S$GLB,, | Performed by: INTERNAL MEDICINE

## 2019-09-30 PROCEDURE — 3008F PR BODY MASS INDEX (BMI) DOCUMENTED: ICD-10-PCS | Mod: CPTII,S$GLB,, | Performed by: INTERNAL MEDICINE

## 2019-09-30 PROCEDURE — 90686 FLU VACCINE (QUAD) GREATER THAN OR EQUAL TO 3YO PRESERVATIVE FREE IM: ICD-10-PCS | Mod: S$GLB,,, | Performed by: INTERNAL MEDICINE

## 2019-09-30 PROCEDURE — 3074F SYST BP LT 130 MM HG: CPT | Mod: CPTII,S$GLB,, | Performed by: INTERNAL MEDICINE

## 2019-09-30 PROCEDURE — 3074F PR MOST RECENT SYSTOLIC BLOOD PRESSURE < 130 MM HG: ICD-10-PCS | Mod: CPTII,S$GLB,, | Performed by: INTERNAL MEDICINE

## 2019-09-30 PROCEDURE — 3079F DIAST BP 80-89 MM HG: CPT | Mod: CPTII,S$GLB,, | Performed by: INTERNAL MEDICINE

## 2019-09-30 PROCEDURE — 90471 IMMUNIZATION ADMIN: CPT | Mod: S$GLB,,, | Performed by: INTERNAL MEDICINE

## 2019-09-30 PROCEDURE — 3079F PR MOST RECENT DIASTOLIC BLOOD PRESSURE 80-89 MM HG: ICD-10-PCS | Mod: CPTII,S$GLB,, | Performed by: INTERNAL MEDICINE

## 2019-09-30 PROCEDURE — 99999 PR PBB SHADOW E&M-EST. PATIENT-LVL IV: ICD-10-PCS | Mod: PBBFAC,,, | Performed by: INTERNAL MEDICINE

## 2019-09-30 RX ORDER — SULFAMETHOXAZOLE AND TRIMETHOPRIM 800; 160 MG/1; MG/1
1 TABLET ORAL 2 TIMES DAILY
Qty: 14 TABLET | Refills: 0 | Status: SHIPPED | OUTPATIENT
Start: 2019-09-30 | End: 2020-09-10

## 2019-09-30 RX ORDER — BUPROPION HYDROCHLORIDE 300 MG/1
300 TABLET ORAL DAILY
COMMUNITY
End: 2020-03-23

## 2019-09-30 RX ORDER — PHENTERMINE HYDROCHLORIDE 37.5 MG/1
37.5 CAPSULE ORAL EVERY MORNING
COMMUNITY
End: 2020-12-18

## 2019-09-30 RX ORDER — ESCITALOPRAM OXALATE 5 MG/1
5 TABLET ORAL DAILY
Qty: 90 TABLET | Refills: 3 | Status: SHIPPED | OUTPATIENT
Start: 2019-09-30 | End: 2019-10-08 | Stop reason: SDUPTHER

## 2019-09-30 RX ORDER — FUROSEMIDE 40 MG/1
40 TABLET ORAL DAILY PRN
Qty: 30 TABLET | Refills: 5 | Status: SHIPPED | OUTPATIENT
Start: 2019-09-30 | End: 2019-10-08 | Stop reason: SDUPTHER

## 2019-09-30 NOTE — ASSESSMENT & PLAN NOTE
Was on lasix in past.  Better after weight loss surgery but has become an issue in hands and feet lately.

## 2019-09-30 NOTE — ASSESSMENT & PLAN NOTE
Since breast biopsy on 9/16/19.  Has been putting abx ointment on it.  No fever, some drainage/pus.

## 2019-09-30 NOTE — ASSESSMENT & PLAN NOTE
Doesn't feel like wellbutrin was working well for her. No SI/HI/panic attacks.  Didn't tolerate viibryd in past.

## 2019-09-30 NOTE — PROGRESS NOTES
[facility] Note    Chief Complaint      Chief Complaint   Patient presents with    Establish Care     f/u from [facility]     Medication Refill     History of Present Illness      Sanjana Queen is a 39 y.o. female who presents today to establish care for left breast pain.  Patient comes to appointment with .     Problem List Items Addressed This Visit     GERD (gastroesophageal reflux disease)    Current Assessment & Plan     Stable on protonix, no nausea/reflux         Hypothyroid    Current Assessment & Plan     Thyroid labs WNL in 3/2019. Not on meds.         Recurrent major depressive disorder, in remission    Current Assessment & Plan     Doesn't feel like wellbutrin was working well for her. No SI/HI/panic attacks.  Didn't tolerate viibryd in past.           Relevant Medications    escitalopram oxalate (LEXAPRO) 5 MG Tab    Other constipation    Current Assessment & Plan     Stable on linzess and milk of mag.         Type 2 diabetes mellitus without complication, without long-term current use of insulin - Primary    Current Assessment & Plan     A1C 6.1 in 3/2019 on Janumet.         Relevant Orders    Microalbumin/creatinine urine ratio    Other hyperlipidemia    Current Assessment & Plan     Stable on Crestor 5 mg daily, no myalgias.         Fibroadenoma of left breast    Current Assessment & Plan     Was having bloody/brown nipple discharge 3-4 months ago.  Had U/S with lump.  Biopsy with Dr. Cohen showed fibroadenoma.         Breast pain, left    Current Assessment & Plan     Since breast biopsy on 9/16/19.  Has been putting abx ointment on it.  No fever, some drainage/pus.           Relevant Medications    sulfamethoxazole-trimethoprim 800-160mg (BACTRIM DS) 800-160 mg Tab    Fluid retention    Current Assessment & Plan     Was on lasix in past.  Better after weight loss surgery but has become an issue in hands and feet lately.         Relevant Medications    furosemide  (LASIX) 40 MG tablet          Health Maintenance   Topic Date Due    Hemoglobin A1c  1981    Eye Exam  1991    Urine Microalbumin  1991    TETANUS VACCINE  1999    Lipid Panel  2020    Foot Exam  2020    Pap Smear with HPV Cotest  2022    Pneumococcal Vaccine (Medium Risk)  Completed       Past Medical History:   Diagnosis Date    Anemia     Asthma     Cardiomyopathy, peripartum     GERD (gastroesophageal reflux disease)     Hypertension     Hypothyroidism     not at present    Morbid obesity     Peripartum cardiomyopathy 2015    Snoring        Past Surgical History:   Procedure Laterality Date    BLADDER SURGERY      DILATION AND CURETTAGE OF UTERUS      ENDOMETRIAL ABLATION      FRACTURE SURGERY      gastric sleeve N/A     spleen surgery      TONSILLECTOMY      TUBAL LIGATION         family history includes Cancer in her maternal grandmother; Diabetes in her father and mother; Heart disease in her father and maternal grandfather; Hypertension in her father, maternal grandfather, maternal grandmother, and sister.    Social History     Tobacco Use    Smoking status: Former Smoker     Packs/day: 1.00     Types: Cigarettes     Start date: 3/1/1998     Last attempt to quit: 2018     Years since quittin.0    Smokeless tobacco: Never Used   Substance Use Topics    Alcohol use: No     Frequency: Monthly or less     Drinks per session: 1 or 2     Binge frequency: Never    Drug use: No       Review of Systems   Constitutional: Negative for chills and fever.   HENT: Negative for congestion, hearing loss and sore throat.    Eyes: Negative for blurred vision and discharge.   Respiratory: Negative for cough, shortness of breath and wheezing.    Cardiovascular: Negative for chest pain and palpitations.   Gastrointestinal: Negative for blood in stool, constipation, diarrhea, nausea and vomiting.   Genitourinary: Negative for dysuria and hematuria.    Musculoskeletal: Negative for falls, myalgias and neck pain.   Skin: Negative for itching and rash.   Neurological: Negative for dizziness, weakness and headaches.   Endo/Heme/Allergies: Negative for polydipsia.        Outpatient Encounter Medications as of 9/30/2019   Medication Sig Dispense Refill    buPROPion (WELLBUTRIN XL) 300 MG 24 hr tablet Take 300 mg by mouth once daily.      Lactobac no.41/Bifidobact no.7 (PROBIOTIC-10 ORAL)       linaCLOtide (LINZESS) 290 mcg Cap capsule Take 290 mcg by mouth once daily.      olopatadine (PATADAY) 0.2 % Drop Place 1 drop into both eyes once daily. 1 Bottle 0    pantoprazole (PROTONIX) 40 MG tablet Take 1 tablet by mouth once daily.      phentermine 37.5 MG capsule Take 37.5 mg by mouth every morning.      rosuvastatin (CRESTOR) 5 MG tablet 1 tablet      SITagliptan-metformin (JANUMET)  mg per tablet Take 1 tablet by mouth 2 (two) times daily. 180 tablet 1    escitalopram oxalate (LEXAPRO) 5 MG Tab Take 1 tablet (5 mg total) by mouth once daily. 90 tablet 3    furosemide (LASIX) 40 MG tablet Take 1 tablet (40 mg total) by mouth daily as needed. 30 tablet 5    hyoscyamine (LEVSIN/SL) 0.125 mg Subl DIS 1 T UNT  QID  2    linaclotide (LINZESS) 145 mcg Cap capsule Take 145 mcg by mouth once daily.      ondansetron (ZOFRAN) 4 MG tablet Take 1 tablet (4 mg total) by mouth every 6 (six) hours. (Patient not taking: Reported on 9/30/2019) 12 tablet 0    sodium chloride 0.9 % SprA 1 application      sulfamethoxazole-trimethoprim 800-160mg (BACTRIM DS) 800-160 mg Tab Take 1 tablet by mouth 2 (two) times daily. 14 tablet 0    traMADol (ULTRAM) 50 mg tablet 1-2 every 8 hr as needed 30 tablet 0    triamcinolone acetonide 0.1% (KENALOG) 0.1 % cream MARQUEZ AA BID  0    [DISCONTINUED] aspirin (ECOTRIN) 81 MG EC tablet Take 1 tablet by mouth once daily.       No facility-administered encounter medications on file as of 9/30/2019.         Review of patient's allergies  "indicates:   Allergen Reactions    Doxycycline Anaphylaxis and Other (See Comments)       Physical Exam      Vital Signs  Temp: 98.9 °F (37.2 °C)  Temp src: Oral  Pulse: 104  SpO2: 98 %  BP: 124/82  BP Location: Left arm  Patient Position: Sitting  Pain Score: 0-No pain  Height and Weight  Height: 5' 3" (160 cm)  Weight: 100.4 kg (221 lb 5.5 oz)  BSA (Calculated - sq m): 2.11 sq meters  BMI (Calculated): 39.3  Weight in (lb) to have BMI = 25: 140.8]    Physical Exam   Constitutional: She is oriented to person, place, and time. She appears well-developed and well-nourished.   HENT:   Head: Normocephalic and atraumatic.   Right Ear: External ear normal.   Left Ear: External ear normal.   Eyes: Right eye exhibits no discharge. Left eye exhibits no discharge.   Neck: Normal range of motion. No thyromegaly present.   Cardiovascular: Normal rate, regular rhythm, normal heart sounds and intact distal pulses.   No murmur heard.  Pulses:       Dorsalis pedis pulses are 2+ on the right side, and 2+ on the left side.        Posterior tibial pulses are 2+ on the right side, and 2+ on the left side.   Pulmonary/Chest: Effort normal and breath sounds normal. No respiratory distress.   Abdominal: Soft. Bowel sounds are normal. She exhibits no distension. There is no tenderness.   Musculoskeletal: Normal range of motion. She exhibits no deformity.        Right foot: There is normal range of motion and no deformity.        Left foot: There is normal range of motion and no deformity.   Feet:   Right Foot:   Protective Sensation: 10 sites tested. 10 sites sensed.   Skin Integrity: Negative for ulcer, blister or skin breakdown.   Left Foot:   Protective Sensation: 10 sites tested. 10 sites sensed.   Skin Integrity: Negative for ulcer, blister or skin breakdown.   Neurological: She is alert and oriented to person, place, and time.   Skin: Skin is warm and dry. No rash noted.   Psychiatric: She has a normal mood and affect. Her behavior " is normal.        Laboratory:  CBC:  No results for input(s): WBC, RBC, HGB, HCT, PLT, MCV, MCH, MCHC in the last 2160 hours.  CMP:  No results for input(s): GLU, CALCIUM, ALBUMIN, PROT, NA, K, CO2, CL, BUN, ALKPHOS, ALT, AST, BILITOT in the last 2160 hours.    Invalid input(s): CREATININ  URINALYSIS:  No results for input(s): COLORU, CLARITYU, SPECGRAV, PHUR, PROTEINUA, GLUCOSEU, BILIRUBINCON, BLOODU, WBCU, RBCU, BACTERIA, MUCUS, NITRITE, LEUKOCYTESUR, UROBILINOGEN, HYALINECASTS in the last 2160 hours.   LIPIDS:  No results for input(s): TSH, HDL, CHOL, TRIG, LDLCALC, CHOLHDL, NONHDLCHOL, TOTALCHOLEST in the last 2160 hours.  TSH:  No results for input(s): TSH in the last 2160 hours.  A1C:  No results for input(s): HGBA1C in the last 2160 hours.    Radiology:  No new imaging    Assessment/Plan     Sanjana Queen is a 39 y.o.female with:    1. Recurrent major depressive disorder, in remission  - escitalopram oxalate (LEXAPRO) 5 MG Tab; Take 1 tablet (5 mg total) by mouth once daily.  Dispense: 90 tablet; Refill: 3    2. Gastroesophageal reflux disease, esophagitis presence not specified    3. Other constipation    4. Type 2 diabetes mellitus without complication, without long-term current use of insulin  - Microalbumin/creatinine urine ratio; Future  - Microalbumin/creatinine urine ratio    5. Acquired hypothyroidism    6. Other hyperlipidemia    7. Fibroadenoma of left breast    8. Breast pain, left  - sulfamethoxazole-trimethoprim 800-160mg (BACTRIM DS) 800-160 mg Tab; Take 1 tablet by mouth 2 (two) times daily.  Dispense: 14 tablet; Refill: 0    9. Fluid retention  - furosemide (LASIX) 40 MG tablet; Take 1 tablet (40 mg total) by mouth daily as needed.  Dispense: 30 tablet; Refill: 5    -Will try lexapro 5 mg, counseled on weight gain risk  -Restart Lasix for fluid retention, counseled on low sodium diet  -Send bactrim for possible biopsy site infection  -will make eye appt with Dr. Cruz  -Continue current  medications and maintain follow up with specialists.  Return to clinic in 6 months.      Gisselle Tavera MD  [facility] [place]

## 2019-09-30 NOTE — ASSESSMENT & PLAN NOTE
Was having bloody/brown nipple discharge 3-4 months ago.  Had U/S with lump.  Biopsy with Dr. Cohen showed fibroadenoma.

## 2019-10-04 DIAGNOSIS — E11.9 TYPE 2 DIABETES MELLITUS WITHOUT COMPLICATION: ICD-10-CM

## 2019-10-08 ENCOUNTER — PATIENT MESSAGE (OUTPATIENT)
Dept: FAMILY MEDICINE | Facility: CLINIC | Age: 39
End: 2019-10-08

## 2019-10-08 DIAGNOSIS — F33.40 RECURRENT MAJOR DEPRESSIVE DISORDER, IN REMISSION: ICD-10-CM

## 2019-10-08 DIAGNOSIS — R60.9 FLUID RETENTION: ICD-10-CM

## 2019-10-08 LAB
A1C: 5 (ref 4.2–5.6)
CHOLEST SERPL-MSCNC: 188 MG/DL (ref 0–200)
HBA1C MFR BLD: 5 % (ref 4–6)
HDLC SERPL-MCNC: 45 MG/DL (ref 0–39)
LDL/HDL RATIO: 2.7 (ref 0–3.2)
LDLC SERPL CALC-MCNC: 123 MG/DL (ref 0–100)
TRIGL SERPL-MCNC: 102 MG/DL (ref 0–150)

## 2019-10-08 NOTE — TELEPHONE ENCOUNTER
Patient is asking [facility] were [facility] to [facility] at her last visit and [facility]

## 2019-10-09 RX ORDER — FUROSEMIDE 40 MG/1
40 TABLET ORAL DAILY PRN
Qty: 90 TABLET | Refills: 3 | Status: SHIPPED | OUTPATIENT
Start: 2019-10-09 | End: 2020-05-17 | Stop reason: SDUPTHER

## 2019-10-09 RX ORDER — ESCITALOPRAM OXALATE 5 MG/1
5 TABLET ORAL DAILY
Qty: 90 TABLET | Refills: 3 | Status: SHIPPED | OUTPATIENT
Start: 2019-10-09 | End: 2020-03-23

## 2019-11-18 ENCOUNTER — PATIENT MESSAGE (OUTPATIENT)
Dept: FAMILY MEDICINE | Facility: CLINIC | Age: 39
End: 2019-11-18

## 2019-11-18 ENCOUNTER — PATIENT OUTREACH (OUTPATIENT)
Dept: ADMINISTRATIVE | Facility: HOSPITAL | Age: 39
End: 2019-11-18

## 2019-11-18 DIAGNOSIS — E78.49 OTHER HYPERLIPIDEMIA: ICD-10-CM

## 2019-11-18 DIAGNOSIS — K21.9 GASTROESOPHAGEAL REFLUX DISEASE WITHOUT ESOPHAGITIS: Primary | ICD-10-CM

## 2019-11-18 RX ORDER — PANTOPRAZOLE SODIUM 40 MG/1
40 TABLET, DELAYED RELEASE ORAL DAILY
Qty: 90 TABLET | Refills: 3 | Status: SHIPPED | OUTPATIENT
Start: 2019-11-18 | End: 2020-12-18 | Stop reason: SDUPTHER

## 2019-11-18 RX ORDER — ROSUVASTATIN CALCIUM 20 MG/1
20 TABLET, COATED ORAL DAILY
Qty: 90 TABLET | Refills: 3 | Status: SHIPPED | OUTPATIENT
Start: 2019-11-18 | End: 2021-01-12 | Stop reason: SDUPTHER

## 2019-11-20 ENCOUNTER — PATIENT OUTREACH (OUTPATIENT)
Dept: ADMINISTRATIVE | Facility: HOSPITAL | Age: 39
End: 2019-11-20

## 2019-12-04 ENCOUNTER — PATIENT OUTREACH (OUTPATIENT)
Dept: ADMINISTRATIVE | Facility: HOSPITAL | Age: 39
End: 2019-12-04

## 2020-03-18 ENCOUNTER — PATIENT MESSAGE (OUTPATIENT)
Dept: FAMILY MEDICINE | Facility: CLINIC | Age: 40
End: 2020-03-18

## 2020-03-23 ENCOUNTER — OFFICE VISIT (OUTPATIENT)
Dept: FAMILY MEDICINE | Facility: CLINIC | Age: 40
End: 2020-03-23
Payer: COMMERCIAL

## 2020-03-23 ENCOUNTER — PATIENT MESSAGE (OUTPATIENT)
Dept: FAMILY MEDICINE | Facility: CLINIC | Age: 40
End: 2020-03-23

## 2020-03-23 DIAGNOSIS — R60.9 FLUID RETENTION: ICD-10-CM

## 2020-03-23 DIAGNOSIS — J45.20 MILD INTERMITTENT ASTHMA WITHOUT COMPLICATION: ICD-10-CM

## 2020-03-23 DIAGNOSIS — E11.9 TYPE 2 DIABETES MELLITUS WITHOUT COMPLICATION, WITHOUT LONG-TERM CURRENT USE OF INSULIN: ICD-10-CM

## 2020-03-23 DIAGNOSIS — E66.01 MORBID OBESITY WITH BMI OF 50.0-59.9, ADULT: ICD-10-CM

## 2020-03-23 DIAGNOSIS — F33.40 RECURRENT MAJOR DEPRESSIVE DISORDER, IN REMISSION: ICD-10-CM

## 2020-03-23 DIAGNOSIS — E78.5 HYPERLIPIDEMIA DUE TO TYPE 2 DIABETES MELLITUS: ICD-10-CM

## 2020-03-23 DIAGNOSIS — E03.9 ACQUIRED HYPOTHYROIDISM: Primary | ICD-10-CM

## 2020-03-23 DIAGNOSIS — E11.69 HYPERLIPIDEMIA DUE TO TYPE 2 DIABETES MELLITUS: ICD-10-CM

## 2020-03-23 DIAGNOSIS — K21.9 GASTROESOPHAGEAL REFLUX DISEASE WITHOUT ESOPHAGITIS: ICD-10-CM

## 2020-03-23 DIAGNOSIS — K59.09 OTHER CONSTIPATION: ICD-10-CM

## 2020-03-23 PROBLEM — I10 ESSENTIAL HYPERTENSION: Status: ACTIVE | Noted: 2020-03-23

## 2020-03-23 PROCEDURE — 99214 PR OFFICE/OUTPT VISIT, EST, LEVL IV, 30-39 MIN: ICD-10-PCS | Mod: GT,,, | Performed by: INTERNAL MEDICINE

## 2020-03-23 PROCEDURE — 3044F HG A1C LEVEL LT 7.0%: CPT | Mod: CPTII,GT,, | Performed by: INTERNAL MEDICINE

## 2020-03-23 PROCEDURE — 3044F PR MOST RECENT HEMOGLOBIN A1C LEVEL <7.0%: ICD-10-PCS | Mod: CPTII,GT,, | Performed by: INTERNAL MEDICINE

## 2020-03-23 PROCEDURE — 99214 OFFICE O/P EST MOD 30 MIN: CPT | Mod: GT,,, | Performed by: INTERNAL MEDICINE

## 2020-03-23 NOTE — ASSESSMENT & PLAN NOTE
BP has been increasing at other doctor's visits, doesn't have BP cuff at home.  Has been watching salt.

## 2020-03-23 NOTE — ASSESSMENT & PLAN NOTE
Took herself off of lexapro, no longer on wellbutrin either. No SI/HI/panic attacks.  Didn't tolerate viibryd in past.  Has been feeling well.

## 2020-03-23 NOTE — PROGRESS NOTES
The patient location is: HOME  The chief complaint leading to consultation is: DM2 follow up  Visit type: Virtual visit with synchronous audio and video  Total time spent with patient: 15 minutes  Each patient to whom he or she provides medical services by telemedicine is:  (1) informed of the relationship between the physician and patient and the respective role of any other health care provider with respect to management of the patient; and (2) notified that he or she may decline to receive medical services by telemedicine and may withdraw from such care at any time.    [facility] Note    Chief Complaint      Chief Complaint   Patient presents with    Diabetes     History of Present Illness      Sanjana Queen is a 39 y.o. female who presents today to establish care for left breast pain.  Patient comes to appointment with .     Problem List Items Addressed This Visit     Asthma, mild intermittent    Current Assessment & Plan     Sees Dr. Alexa Glover, allergist.  Just restarting symbicort.         GERD (gastroesophageal reflux disease)    Current Assessment & Plan     Stable on protonix, no nausea/reflux.  Had hiatal hernia repaired 2/2020.         Morbid obesity with BMI of 50.0-59.9, adult    Current Assessment & Plan     Gained 10 pounds since last visit         Hypothyroid - Primary    Current Assessment & Plan     Thyroid labs WNL in 3/2019. Not on meds.         Relevant Orders    TSH    T4, free    Recurrent major depressive disorder, in remission    Current Assessment & Plan     Took herself off of lexapro, no longer on wellbutrin either. No SI/HI/panic attacks.  Didn't tolerate viibryd in past.  Has been feeling well.         Other constipation    Current Assessment & Plan     Stable on linzess and milk of mag.  Works well for her.         Type 2 diabetes mellitus without complication, without long-term current use of insulin    Current Assessment & Plan     A1C 5 in  10/2019 on Janumet.         Relevant Orders    Hemoglobin A1c    Hyperlipidemia due to type 2 diabetes mellitus    Current Assessment & Plan      in 10/2019 with Dr. Luu.  On crestor 20 mg daily.         Fluid retention    Current Assessment & Plan     Still has on occasion, lasix helps.  Worse on certain days.                Health Maintenance   Topic Date Due    Eye Exam  1991    TETANUS VACCINE  1999    Hemoglobin A1c  2020    Foot Exam  2020    Lipid Panel  10/03/2020    Urine Microalbumin  10/03/2020    Pap Smear with HPV Cotest  2022    Pneumococcal Vaccine (Medium Risk)  Completed       Past Medical History:   Diagnosis Date    Anemia     Asthma     Cardiomyopathy, peripartum     GERD (gastroesophageal reflux disease)     Hypertension     Hypothyroidism     not at present    Morbid obesity     Peripartum cardiomyopathy 2015    Snoring        Past Surgical History:   Procedure Laterality Date    BLADDER SURGERY      DILATION AND CURETTAGE OF UTERUS      ENDOMETRIAL ABLATION      FRACTURE SURGERY      gastric sleeve N/A     spleen surgery      TONSILLECTOMY      TUBAL LIGATION         family history includes Cancer in her maternal grandmother; Diabetes in her father and mother; Heart disease in her father and maternal grandfather; Hypertension in her father, maternal grandfather, maternal grandmother, and sister.    Social History     Tobacco Use    Smoking status: Former Smoker     Packs/day: 1.00     Types: Cigarettes     Start date: 3/1/1998     Last attempt to quit: 2018     Years since quittin.5    Smokeless tobacco: Never Used   Substance Use Topics    Alcohol use: No     Frequency: Monthly or less     Drinks per session: 1 or 2     Binge frequency: Never    Drug use: No       Review of Systems   Constitutional: Negative for chills and fever.   HENT: Negative for congestion, hearing loss and sore throat.    Eyes: Negative  for blurred vision and discharge.   Respiratory: Negative for cough, shortness of breath and wheezing.    Cardiovascular: Negative for chest pain and palpitations.   Gastrointestinal: Negative for blood in stool, constipation, diarrhea, nausea and vomiting.   Genitourinary: Negative for dysuria and hematuria.   Musculoskeletal: Negative for falls, myalgias and neck pain.   Skin: Negative for itching and rash.   Neurological: Negative for dizziness, weakness and headaches.   Endo/Heme/Allergies: Negative for polydipsia.        Outpatient Encounter Medications as of 3/23/2020   Medication Sig Dispense Refill    furosemide (LASIX) 40 MG tablet Take 1 tablet (40 mg total) by mouth daily as needed. 90 tablet 3    Lactobac no.41/Bifidobact no.7 (PROBIOTIC-10 ORAL)       linaCLOtide (LINZESS) 290 mcg Cap capsule Take 290 mcg by mouth once daily.      olopatadine (PATADAY) 0.2 % Drop Place 1 drop into both eyes once daily. 1 Bottle 0    pantoprazole (PROTONIX) 40 MG tablet Take 1 tablet (40 mg total) by mouth once daily. 90 tablet 3    phentermine 37.5 MG capsule Take 37.5 mg by mouth every morning.      rosuvastatin (CRESTOR) 20 MG tablet Take 1 tablet (20 mg total) by mouth once daily. 90 tablet 3    SITagliptan-metformin (JANUMET)  mg per tablet Take 1 tablet by mouth 2 (two) times daily. 180 tablet 3    sulfamethoxazole-trimethoprim 800-160mg (BACTRIM DS) 800-160 mg Tab Take 1 tablet by mouth 2 (two) times daily. 14 tablet 0    triamcinolone acetonide 0.1% (KENALOG) 0.1 % cream MARQEUZ AA BID  0    [DISCONTINUED] buPROPion (WELLBUTRIN XL) 300 MG 24 hr tablet Take 300 mg by mouth once daily.      [DISCONTINUED] escitalopram oxalate (LEXAPRO) 5 MG Tab Take 1 tablet (5 mg total) by mouth once daily. 90 tablet 3    [DISCONTINUED] hyoscyamine (LEVSIN/SL) 0.125 mg Subl DIS 1 T UNT  QID  2    [DISCONTINUED] linaclotide (LINZESS) 145 mcg Cap capsule Take 145 mcg by mouth once daily.      [DISCONTINUED]  ondansetron (ZOFRAN) 4 MG tablet Take 1 tablet (4 mg total) by mouth every 6 (six) hours. (Patient not taking: Reported on 9/30/2019) 12 tablet 0    [DISCONTINUED] sodium chloride 0.9 % SprA 1 application      [DISCONTINUED] traMADol (ULTRAM) 50 mg tablet 1-2 every 8 hr as needed 30 tablet 0     No facility-administered encounter medications on file as of 3/23/2020.         Review of patient's allergies indicates:   Allergen Reactions    Doxycycline Anaphylaxis and Other (See Comments)    Iodine     Penicillins     Sulfadiazine        Physical Exam       ]    Physical Exam   Constitutional: She is oriented to person, place, and time. She appears well-developed and well-nourished. No distress.   HENT:   Head: Normocephalic and atraumatic.   Pulmonary/Chest: Effort normal. No respiratory distress.   Musculoskeletal: Normal range of motion. She exhibits no deformity.   Neurological: She is alert and oriented to person, place, and time. She exhibits normal muscle tone. Coordination normal.   Skin: Skin is warm. No rash noted. She is not diaphoretic. No erythema. No pallor.   Psychiatric: She has a normal mood and affect. Her behavior is normal. Judgment and thought content normal.        Laboratory:  CBC:  No results for input(s): WBC, RBC, HGB, HCT, PLT, MCV, MCH, MCHC in the last 2160 hours.  CMP:  No results for input(s): GLU, CALCIUM, ALBUMIN, PROT, NA, K, CO2, CL, BUN, ALKPHOS, ALT, AST, BILITOT in the last 2160 hours.    Invalid input(s): CREATININ  URINALYSIS:  No results for input(s): COLORU, CLARITYU, SPECGRAV, PHUR, PROTEINUA, GLUCOSEU, BILIRUBINCON, BLOODU, WBCU, RBCU, BACTERIA, MUCUS, NITRITE, LEUKOCYTESUR, UROBILINOGEN, HYALINECASTS in the last 2160 hours.   LIPIDS:  No results for input(s): TSH, HDL, CHOL, TRIG, LDLCALC, CHOLHDL, NONHDLCHOL, TOTALCHOLEST in the last 2160 hours.  TSH:  No results for input(s): TSH in the last 2160 hours.  A1C:  No results for input(s): HGBA1C in the last 2160  hours.    Radiology:  No new imaging    Assessment/Plan     Sanjana Queen is a 39 y.o.female with:    1. Acquired hypothyroidism  - TSH; Future  - T4, free; Future    2. Recurrent major depressive disorder, in remission    3. Type 2 diabetes mellitus without complication, without long-term current use of insulin  - Hemoglobin A1c; Future    4. Morbid obesity with BMI of 50.0-59.9, adult    5. Hyperlipidemia due to type 2 diabetes mellitus    6. Fluid retention    7. Other constipation    8. Gastroesophageal reflux disease without esophagitis    9. Mild intermittent asthma without complication    -will check thyroid and A1C, wants to do at main campus  -Can take 1.5 tablets of Lasix, works well for her  -will monitor BP at home, will get cuff  -Continue current medications and maintain follow up with specialists.  Return to clinic in 6 months.      Gisselle Tavera MD  [facility]                  Answers for HPI/ROS submitted by the patient on 3/22/2020   activity change: No  unexpected weight change: No  rhinorrhea: No  trouble swallowing: No  visual disturbance: No  chest tightness: No  polyuria: No  difficulty urinating: No  menstrual problem: No  joint swelling: No  arthralgias: No  confusion: No  dysphoric mood: No

## 2020-03-24 ENCOUNTER — LAB VISIT (OUTPATIENT)
Dept: LAB | Facility: HOSPITAL | Age: 40
End: 2020-03-24
Attending: INTERNAL MEDICINE
Payer: COMMERCIAL

## 2020-03-24 DIAGNOSIS — E03.9 ACQUIRED HYPOTHYROIDISM: ICD-10-CM

## 2020-03-24 DIAGNOSIS — E11.9 TYPE 2 DIABETES MELLITUS WITHOUT COMPLICATION, WITHOUT LONG-TERM CURRENT USE OF INSULIN: ICD-10-CM

## 2020-03-24 LAB
ESTIMATED AVG GLUCOSE: 100 MG/DL (ref 68–131)
HBA1C MFR BLD HPLC: 5.1 % (ref 4–5.6)
T4 FREE SERPL-MCNC: 0.91 NG/DL (ref 0.71–1.51)
TSH SERPL DL<=0.005 MIU/L-ACNC: 1.72 UIU/ML (ref 0.4–4)

## 2020-03-24 PROCEDURE — 84443 ASSAY THYROID STIM HORMONE: CPT

## 2020-03-24 PROCEDURE — 84439 ASSAY OF FREE THYROXINE: CPT

## 2020-03-24 PROCEDURE — 36415 COLL VENOUS BLD VENIPUNCTURE: CPT

## 2020-03-24 PROCEDURE — 83036 HEMOGLOBIN GLYCOSYLATED A1C: CPT

## 2020-04-23 ENCOUNTER — PATIENT MESSAGE (OUTPATIENT)
Dept: ADMINISTRATIVE | Facility: HOSPITAL | Age: 40
End: 2020-04-23

## 2020-04-23 ENCOUNTER — PATIENT OUTREACH (OUTPATIENT)
Dept: ADMINISTRATIVE | Facility: HOSPITAL | Age: 40
End: 2020-04-23

## 2020-05-14 ENCOUNTER — PATIENT OUTREACH (OUTPATIENT)
Dept: ADMINISTRATIVE | Facility: OTHER | Age: 40
End: 2020-05-14

## 2020-05-14 NOTE — PROGRESS NOTES
Chart reviewed.   Immunizations: updated  Orders placed: n/a  Upcoming appts to satisfy CLAUDIA topics: Optometry 5/15

## 2020-05-15 ENCOUNTER — OFFICE VISIT (OUTPATIENT)
Dept: OPTOMETRY | Facility: CLINIC | Age: 40
End: 2020-05-15
Payer: COMMERCIAL

## 2020-05-15 DIAGNOSIS — E11.9 TYPE 2 DIABETES MELLITUS WITHOUT OPHTHALMIC MANIFESTATIONS: ICD-10-CM

## 2020-05-15 DIAGNOSIS — H04.123 DRY EYE SYNDROME, BILATERAL: ICD-10-CM

## 2020-05-15 DIAGNOSIS — I10 ESSENTIAL HYPERTENSION: ICD-10-CM

## 2020-05-15 DIAGNOSIS — H10.13 ALLERGIC CONJUNCTIVITIS OF BOTH EYES: ICD-10-CM

## 2020-05-15 DIAGNOSIS — E11.9 TYPE 2 DIABETES MELLITUS WITHOUT COMPLICATION, WITHOUT LONG-TERM CURRENT USE OF INSULIN: ICD-10-CM

## 2020-05-15 DIAGNOSIS — Z01.00 EMMETROPIA: Primary | ICD-10-CM

## 2020-05-15 PROCEDURE — 92015 DETERMINE REFRACTIVE STATE: CPT | Mod: S$GLB,,, | Performed by: OPTOMETRIST

## 2020-05-15 PROCEDURE — 92004 COMPRE OPH EXAM NEW PT 1/>: CPT | Mod: S$GLB,,, | Performed by: OPTOMETRIST

## 2020-05-15 PROCEDURE — 99999 PR PBB SHADOW E&M-EST. PATIENT-LVL II: ICD-10-PCS | Mod: PBBFAC,,, | Performed by: OPTOMETRIST

## 2020-05-15 PROCEDURE — 99999 PR PBB SHADOW E&M-EST. PATIENT-LVL II: CPT | Mod: PBBFAC,,, | Performed by: OPTOMETRIST

## 2020-05-15 PROCEDURE — 92004 PR EYE EXAM, NEW PATIENT,COMPREHESV: ICD-10-PCS | Mod: S$GLB,,, | Performed by: OPTOMETRIST

## 2020-05-15 PROCEDURE — 92015 PR REFRACTION: ICD-10-PCS | Mod: S$GLB,,, | Performed by: OPTOMETRIST

## 2020-05-15 RX ORDER — CIPROFLOXACIN AND DEXAMETHASONE 3; 1 MG/ML; MG/ML
SUSPENSION/ DROPS AURICULAR (OTIC)
COMMUNITY
Start: 2020-03-23 | End: 2020-12-18

## 2020-05-15 RX ORDER — LOTEPREDNOL ETABONATE 5 MG/ML
1 SUSPENSION/ DROPS OPHTHALMIC 4 TIMES DAILY
Qty: 5 ML | Refills: 1 | Status: SHIPPED | OUTPATIENT
Start: 2020-05-15 | End: 2021-05-15

## 2020-05-15 NOTE — PROGRESS NOTES
HPI     Last eye exam was    Pt states she is here today because she has been having itchiness and   burning OU.   Pt states she does wake up with crustiness OU as well.   Pt does use Pataday drop but finds no relief.   No eye sx.    Last edited by Marilu Hollins on 5/15/2020  8:16 AM. (History)            Assessment /Plan     For exam results, see Encounter Report.        Essential hypertension  Type 2 diabetes mellitus without complication, without long-term current use of insulin  Type 2 diabetes mellitus without ophthalmic manifestations   Discussed importance of A1c control   Monitor yearly    Allergic conjunctivitis of both eyes  -     loteprednol (LOTEMAX) 0.5 % ophthalmic suspension; Place 1 drop into both eyes 4 (four) times daily. QID x 4 days, then BID x 1 week, then QD x 1 week  Dispense: 5 mL; Refill: 1   olopatadine (PAZEO) 0.7 % Drop; Apply 1 drop to eye once daily.  Dispense: 2.5 mL; Refill: 12    Dry eye syndrome, bilateral      lifitegrast (XIIDRA) 5 % Dpet; Apply 1 drop to eye every 12 (twelve) hours.  Dispense: 180 each; Refill: 4   Refresh liquigel PRN    RTC 1 year, sooner PRN

## 2020-05-16 ENCOUNTER — PATIENT MESSAGE (OUTPATIENT)
Dept: OPTOMETRY | Facility: CLINIC | Age: 40
End: 2020-05-16

## 2020-05-17 ENCOUNTER — PATIENT MESSAGE (OUTPATIENT)
Dept: FAMILY MEDICINE | Facility: CLINIC | Age: 40
End: 2020-05-17

## 2020-05-17 DIAGNOSIS — R60.9 FLUID RETENTION: ICD-10-CM

## 2020-05-18 ENCOUNTER — PATIENT MESSAGE (OUTPATIENT)
Dept: FAMILY MEDICINE | Facility: CLINIC | Age: 40
End: 2020-05-18

## 2020-05-18 DIAGNOSIS — R60.9 FLUID RETENTION: ICD-10-CM

## 2020-05-18 RX ORDER — FUROSEMIDE 40 MG/1
60 TABLET ORAL DAILY PRN
Qty: 135 TABLET | Refills: 3 | Status: SHIPPED | OUTPATIENT
Start: 2020-05-18 | End: 2021-03-19 | Stop reason: SDUPTHER

## 2020-05-18 RX ORDER — FUROSEMIDE 40 MG/1
60 TABLET ORAL DAILY PRN
Qty: 135 TABLET | Refills: 3 | Status: SHIPPED | OUTPATIENT
Start: 2020-05-18 | End: 2020-05-18 | Stop reason: SDUPTHER

## 2020-05-19 ENCOUNTER — PATIENT MESSAGE (OUTPATIENT)
Dept: OPTOMETRY | Facility: CLINIC | Age: 40
End: 2020-05-19

## 2020-05-19 ENCOUNTER — TELEPHONE (OUTPATIENT)
Dept: OPTOMETRY | Facility: CLINIC | Age: 40
End: 2020-05-19

## 2020-05-19 NOTE — TELEPHONE ENCOUNTER
Called and spoke with pharmacy. Discussed need for pazeo since stronger and she is already currently takeing pataday   ----- Message from Marilu Hollins sent at 5/18/2020  3:59 PM CDT -----  Contact: [facility]      ----- Message -----  From: Anahy Montemayor  Sent: 5/18/2020   3:51 PM CDT  To: Gil HERNANDEZ Staff    Vidhi was called in for the patient but it is a non preferred drug with her insurance and they would like to speak to someone about those that are on her plan that are much more cost effective.  They are:  Azelastine, Epinastine, Olopatadine.  If either of these works just e-scribe or fax the new prescription to them.  Or if you need to speak them they can be reached at [phone] ref #[id number]

## 2020-06-01 ENCOUNTER — OFFICE VISIT (OUTPATIENT)
Dept: URGENT CARE | Facility: CLINIC | Age: 40
End: 2020-06-01
Payer: COMMERCIAL

## 2020-06-01 VITALS
HEIGHT: 63 IN | OXYGEN SATURATION: 97 % | DIASTOLIC BLOOD PRESSURE: 100 MMHG | SYSTOLIC BLOOD PRESSURE: 157 MMHG | WEIGHT: 221 LBS | RESPIRATION RATE: 18 BRPM | TEMPERATURE: 98 F | HEART RATE: 72 BPM | BODY MASS INDEX: 39.16 KG/M2

## 2020-06-01 DIAGNOSIS — L73.9 FOLLICULITIS: Primary | ICD-10-CM

## 2020-06-01 DIAGNOSIS — R59.9 ENLARGED LYMPH NODE: ICD-10-CM

## 2020-06-01 PROCEDURE — 99213 PR OFFICE/OUTPT VISIT, EST, LEVL III, 20-29 MIN: ICD-10-PCS | Mod: S$GLB,,, | Performed by: NURSE PRACTITIONER

## 2020-06-01 PROCEDURE — 99213 OFFICE O/P EST LOW 20 MIN: CPT | Mod: S$GLB,,, | Performed by: NURSE PRACTITIONER

## 2020-06-01 NOTE — PATIENT INSTRUCTIONS
Return to Urgent Care or go to ER if symptoms worsen or fail to improve.  Keep appointment with PCP as recommended for further management.   Continue bactroban/mupirocin until scalp lesion is completely gone.     Folliculitis  Folliculitis is an inflammation of a hair follicle. A hair follicle is the little pocket where a hair grows out of the skin. Bacteria normally live on the skin. But sometimes bacteria can get trapped in a follicle and cause infection. This causes a bumpy rash. The area over the follicles is red and raised. It may itch or be painful. The bumps may have fluid (pus) inside. The pus may leak and then form crusts. Sores can spread to other areas of the body. Once it goes away, folliculitis can come back at any time. Severe cases may cause permanent hair loss and scarring.  Folliculitis can happen anywhere on the body where hair grows. It can be caused by rubbing from tight clothing. Ingrown hairs can cause it. Soaking in a hot tub or swimming pool that has bacteria in the water can cause it. It may also occur if a hair follicle is blocked by a bandage.  Sores often go away in a few days with no treatment. In some cases, medicine may be given. A small piece of skin or pus may be taken to find the type of bacteria causing the infection.  Home care  The healthcare provider may prescribe an antibiotic cream or ointment.  Oral antibiotics may also be prescribed. Or you may be told to use an over-the-counter antibiotic cream. Follow all instructions when using any of these medicines.  General care:  · Apply warm, moist compresses to the sores for 20 minutes up to 3 times a day. You can make a compress by soaking a cloth in warm water. Squeeze out excess water.  · Dont cut, poke, or squeeze the sores. This can be painful and spread infection.  · Dont scratch the affected area. Scratching can delay healing.  · Dont shave the areas affected by folliculitis.  · If the sores leak fluid, cover the area with  a nonstick gauze bandage. Use as little tape as possible. Carefully discard all soiled bandages.  · Dress in loose cotton clothing.  · Change sheets and blankets if they are soiled by pus. Wash all clothes, towels, sheets, and cloth diapers in soap and hot water. Do not share clothes, towels, or sheets with other family members.  · Do not soak the sores in bath water. This can spread infection. Instead, keep the area clean by gently washing sores with soap and warm water.  · Wash your hands or use antibacterial gels often to prevent spreading the bacteria.  Follow-up care  Follow up with your healthcare provider, or as advised.  When to seek medical advice  Call your healthcare provider right away if any of these occur:  · Fever of 100.4°F (38°C) or higher  · Spreading of the rash  · Rash does not get better with treatment  · Redness or swelling that gets worse  · Rash becomes more painful  · Foul-smelling fluid leaking from the skin  · Rash improves, but then comes back   Date Last Reviewed: 11/1/2016  © 3874-2556 [employer]. [address] [place] [address]. All rights reserved. This information is not intended as a substitute for professional medical care. Always follow your healthcare professional's instructions.

## 2020-06-01 NOTE — PROGRESS NOTES
"Subjective:       Patient ID: Sanjana Queen is a 40 y.o. female.    Vitals:  height is 5' 3" (1.6 m) and weight is 100.2 kg (221 lb). Her temperature is 98.4 °F (36.9 °C). Her blood pressure is 157/100 (abnormal) and her pulse is 72. Her respiration is 18 and oxygen saturation is 97%.     Chief Complaint: Mass    Patient presents with a lump behind right ear that present over a week ago. Patient denies itching but states tender to touch.  She had bariatric surgery about a year ago and did lose quite a bit of hair from her head.  Her hair is starting to grow in and she thinks she may have an infection on her scalp.  She has been using mupirocin on the area as she has a history of recurrent staph skin infections.     Mass   This is a new problem. The current episode started in the past 7 days. The problem occurs constantly. The problem has been unchanged. Pertinent negatives include no arthralgias, chest pain, chills, congestion, coughing, fatigue, fever, headaches, joint swelling, myalgias, nausea, rash, sore throat, vertigo, vomiting or weakness. Nothing aggravates the symptoms. She has tried nothing for the symptoms. The treatment provided no relief.       Constitution: Negative for chills, fatigue and fever.   HENT: Negative for ear pain, ear discharge, foreign body in ear, congestion and sore throat.    Neck: Negative for painful lymph nodes.   Cardiovascular: Negative for chest pain and leg swelling.   Eyes: Negative for double vision and blurred vision.   Respiratory: Negative for cough and shortness of breath.    Gastrointestinal: Negative for nausea, vomiting and diarrhea.   Genitourinary: Negative for dysuria, frequency, urgency and history of kidney stones.   Musculoskeletal: Negative for joint pain, joint swelling, muscle cramps and muscle ache.   Skin: Negative for color change, pale, rash and bruising.   Allergic/Immunologic: Negative for seasonal allergies.   Neurological: Negative for dizziness, " history of vertigo, light-headedness, passing out and headaches.   Hematologic/Lymphatic: Negative for swollen lymph nodes.   Psychiatric/Behavioral: Negative for nervous/anxious, sleep disturbance and depression. The patient is not nervous/anxious.        Objective:      Physical Exam   Constitutional: She is oriented to person, place, and time. She appears well-developed and well-nourished. She is cooperative.  Non-toxic appearance. She does not have a sickly appearance. She does not appear ill. No distress.   HENT:   Head: Normocephalic and atraumatic.       Right Ear: Hearing, tympanic membrane, external ear and ear canal normal. Tympanic membrane is not erythematous.   Left Ear: Hearing, tympanic membrane, external ear and ear canal normal. Tympanic membrane is not erythematous.   Nose: Nose normal. No mucosal edema, rhinorrhea or nasal deformity. No epistaxis. Right sinus exhibits no maxillary sinus tenderness and no frontal sinus tenderness. Left sinus exhibits no maxillary sinus tenderness and no frontal sinus tenderness.   Mouth/Throat: Uvula is midline, oropharynx is clear and moist and mucous membranes are normal. No trismus in the jaw. Normal dentition. No uvula swelling. No posterior oropharyngeal erythema.   Eyes: Conjunctivae and lids are normal. Right eye exhibits no discharge. Left eye exhibits no discharge. No scleral icterus.   Neck: Trachea normal, normal range of motion, full passive range of motion without pain and phonation normal. Neck supple.   Pulmonary/Chest: Effort normal. No respiratory distress.   Abdominal: Normal appearance. She exhibits no pulsatile midline mass.   Musculoskeletal: Normal range of motion. She exhibits no edema or deformity.   Lymphadenopathy:        Head (right side): Posterior auricular adenopathy present. No submental, no submandibular, no tonsillar, no preauricular and no occipital adenopathy present.        Head (left side): No submental, no submandibular, no  tonsillar, no preauricular, no posterior auricular and no occipital adenopathy present.   Neurological: She is alert and oriented to person, place, and time. She exhibits normal muscle tone. Coordination normal.   Skin: Skin is warm, dry, not diaphoretic and not pale.   Psychiatric: She has a normal mood and affect. Her speech is normal and behavior is normal. Thought content normal.   Nursing note and vitals reviewed.        Assessment:       1. Folliculitis    2. Enlarged lymph node        Plan:         Folliculitis    Enlarged lymph node

## 2020-07-08 ENCOUNTER — PATIENT MESSAGE (OUTPATIENT)
Dept: FAMILY MEDICINE | Facility: CLINIC | Age: 40
End: 2020-07-08

## 2020-08-05 ENCOUNTER — PATIENT MESSAGE (OUTPATIENT)
Dept: FAMILY MEDICINE | Facility: CLINIC | Age: 40
End: 2020-08-05

## 2020-09-10 ENCOUNTER — TELEPHONE (OUTPATIENT)
Dept: ADMINISTRATIVE | Facility: HOSPITAL | Age: 40
End: 2020-09-10

## 2020-09-10 ENCOUNTER — OFFICE VISIT (OUTPATIENT)
Dept: FAMILY MEDICINE | Facility: CLINIC | Age: 40
End: 2020-09-10
Payer: COMMERCIAL

## 2020-09-10 VITALS — SYSTOLIC BLOOD PRESSURE: 130 MMHG | DIASTOLIC BLOOD PRESSURE: 86 MMHG

## 2020-09-10 DIAGNOSIS — E11.9 TYPE 2 DIABETES MELLITUS WITHOUT COMPLICATION, WITHOUT LONG-TERM CURRENT USE OF INSULIN: ICD-10-CM

## 2020-09-10 DIAGNOSIS — E11.69 HYPERLIPIDEMIA DUE TO TYPE 2 DIABETES MELLITUS: ICD-10-CM

## 2020-09-10 DIAGNOSIS — Z11.4 SCREENING FOR HIV (HUMAN IMMUNODEFICIENCY VIRUS): ICD-10-CM

## 2020-09-10 DIAGNOSIS — R60.9 FLUID RETENTION: ICD-10-CM

## 2020-09-10 DIAGNOSIS — D24.2 FIBROADENOMA OF LEFT BREAST: ICD-10-CM

## 2020-09-10 DIAGNOSIS — E11.59 HYPERTENSION ASSOCIATED WITH DIABETES: ICD-10-CM

## 2020-09-10 DIAGNOSIS — R59.1 LYMPHADENOPATHY: ICD-10-CM

## 2020-09-10 DIAGNOSIS — J45.20 MILD INTERMITTENT ASTHMA WITHOUT COMPLICATION: ICD-10-CM

## 2020-09-10 DIAGNOSIS — I15.2 HYPERTENSION ASSOCIATED WITH DIABETES: ICD-10-CM

## 2020-09-10 DIAGNOSIS — K59.09 OTHER CONSTIPATION: ICD-10-CM

## 2020-09-10 DIAGNOSIS — E03.9 ACQUIRED HYPOTHYROIDISM: ICD-10-CM

## 2020-09-10 DIAGNOSIS — E78.5 HYPERLIPIDEMIA DUE TO TYPE 2 DIABETES MELLITUS: ICD-10-CM

## 2020-09-10 DIAGNOSIS — Z11.59 SCREENING FOR VIRAL DISEASE: ICD-10-CM

## 2020-09-10 DIAGNOSIS — K21.9 GASTROESOPHAGEAL REFLUX DISEASE WITHOUT ESOPHAGITIS: ICD-10-CM

## 2020-09-10 DIAGNOSIS — E66.01 MORBID OBESITY WITH BMI OF 50.0-59.9, ADULT: ICD-10-CM

## 2020-09-10 DIAGNOSIS — F41.8 DEPRESSION WITH ANXIETY: ICD-10-CM

## 2020-09-10 PROCEDURE — 99214 OFFICE O/P EST MOD 30 MIN: CPT | Mod: 95,,, | Performed by: INTERNAL MEDICINE

## 2020-09-10 PROCEDURE — 3044F HG A1C LEVEL LT 7.0%: CPT | Mod: CPTII,,, | Performed by: INTERNAL MEDICINE

## 2020-09-10 PROCEDURE — 99214 PR OFFICE/OUTPT VISIT, EST, LEVL IV, 30-39 MIN: ICD-10-PCS | Mod: 95,,, | Performed by: INTERNAL MEDICINE

## 2020-09-10 PROCEDURE — 3079F DIAST BP 80-89 MM HG: CPT | Mod: CPTII,,, | Performed by: INTERNAL MEDICINE

## 2020-09-10 PROCEDURE — 3075F SYST BP GE 130 - 139MM HG: CPT | Mod: CPTII,,, | Performed by: INTERNAL MEDICINE

## 2020-09-10 PROCEDURE — 3075F PR MOST RECENT SYSTOLIC BLOOD PRESS GE 130-139MM HG: ICD-10-PCS | Mod: CPTII,,, | Performed by: INTERNAL MEDICINE

## 2020-09-10 PROCEDURE — 3079F PR MOST RECENT DIASTOLIC BLOOD PRESSURE 80-89 MM HG: ICD-10-PCS | Mod: CPTII,,, | Performed by: INTERNAL MEDICINE

## 2020-09-10 PROCEDURE — 3044F PR MOST RECENT HEMOGLOBIN A1C LEVEL <7.0%: ICD-10-PCS | Mod: CPTII,,, | Performed by: INTERNAL MEDICINE

## 2020-09-10 RX ORDER — SITAGLIPTIN AND METFORMIN HYDROCHLORIDE 500; 50 MG/1; MG/1
1 TABLET, FILM COATED ORAL 2 TIMES DAILY
Qty: 180 TABLET | Refills: 3 | Status: SHIPPED | OUTPATIENT
Start: 2020-09-10 | End: 2020-12-18 | Stop reason: SDUPTHER

## 2020-09-10 NOTE — ASSESSMENT & PLAN NOTE
At Allergy visits, BP has been elevated. BP when seen for bronchitis at [facility] was 191/99. BP at home has been 130-140/80-90.  Has been watching salt.

## 2020-09-10 NOTE — PROGRESS NOTES
The patient location is: home  The chief complaint leading to consultation is: DM2    Visit type: audiovisual    Face to Face time with patient: 10 minutes  10 minutes of total time spent on the encounter, which includes face to face time and non-face to face time preparing to see the patient (eg, review of tests), Obtaining and/or reviewing separately obtained history, Documenting clinical information in the electronic or other health record, Independently interpreting results (not separately reported) and communicating results to the patient/family/caregiver, or Care coordination (not separately reported).     Each patient to whom he or she provides medical services by telemedicine is:  (1) informed of the relationship between the physician and patient and the respective role of any other health care provider with respect to management of the patient; and (2) notified that he or she may decline to receive medical services by telemedicine and may withdraw from such care at any time.    [facility] Note    Chief Complaint      No chief complaint on file.    History of Present Illness      Sanjana Queen is a 40 y.o. female who presents today for follow up DM2.  Patient comes to appointment with .     Problem List Items Addressed This Visit     Asthma, mild intermittent    Current Assessment & Plan     Sees Dr. Alexa Glover, allergist.  Stable on symbicort.         GERD (gastroesophageal reflux disease)    Current Assessment & Plan     Stable on protonix, no nausea/reflux.  Had hiatal hernia repaired 2/2020.         Morbid obesity with BMI of 50.0-59.9, adult    Hypothyroid    Current Assessment & Plan     Thyroid labs WNL in 3/2020. Not on meds.         Relevant Orders    TSH    T4, free    Depression with anxiety    Current Assessment & Plan     Gets a little nauseated after taking Viibryd in AM.  Anxiety has been much better since starting.  Was on wellbutrin and lexapro in past.  No SI/HI/panic attacks.            Other constipation    Current Assessment & Plan     Stable on linzess and milk of mag 1 time per week.  Works well for her.         Type 2 diabetes mellitus without complication, without long-term current use of insulin    Current Assessment & Plan     A1C 5.1 in 3/2020 on Janumet. No hypoglycemia.  Hasn't been checking glucoses recently.         Relevant Medications    SITagliptan-metformin (JANUMET)  mg per tablet    Other Relevant Orders    CBC auto differential    Comprehensive metabolic panel    Lipid Panel    Microalbumin/creatinine urine ratio    Hemoglobin A1C    Hyperlipidemia due to type 2 diabetes mellitus    Current Assessment & Plan      in 10/2019 with Dr. Luu.  On crestor 20 mg daily.         Relevant Medications    SITagliptan-metformin (JANUMET)  mg per tablet    Fibroadenoma of left breast    Current Assessment & Plan     Had breast biopsy on 9/16/19. Pending breast MRI.         Fluid retention    Current Assessment & Plan     Still has on occasion, lasix helps. Really cut down on her sodium which helped.         Hypertension associated with diabetes    Current Assessment & Plan     At Allergy visits, BP has been elevated. BP when seen for bronchitis at [facility] was 191/99. BP at home has been 130-140/80-90.  Has been watching salt.         Relevant Medications    SITagliptan-metformin (JANUMET)  mg per tablet      Other Visit Diagnoses     Lymphadenopathy        Relevant Orders    Lactate Dehydrogenase    Screening for viral disease        Relevant Orders    Hepatitis C Antibody    Screening for HIV (human immunodeficiency virus)        Relevant Orders    HIV 1/2 Ag/Ab (4th Gen)          Health Maintenance   Topic Date Due    Hepatitis C Screening  1980    TETANUS VACCINE  04/21/1999    Foot Exam  09/30/2020    Urine Microalbumin  10/03/2020    Hemoglobin A1c  09/24/2020    Lipid Panel  10/03/2020    Eye Exam  05/15/2021     Pneumococcal Vaccine (Medium Risk)  Completed       Past Medical History:   Diagnosis Date    Anemia     Asthma     Cardiomyopathy, peripartum     GERD (gastroesophageal reflux disease)     Hypertension     Hypothyroidism     not at present    Morbid obesity     Peripartum cardiomyopathy 2015    Snoring        Past Surgical History:   Procedure Laterality Date    BLADDER SURGERY      DILATION AND CURETTAGE OF UTERUS      ENDOMETRIAL ABLATION      FRACTURE SURGERY      gastric sleeve N/A     hernia repair 2020      spleen surgery      TONSILLECTOMY      TUBAL LIGATION         family history includes Cancer in her maternal grandmother; Diabetes in her father and mother; Heart disease in her father and maternal grandfather; Hypertension in her father, maternal grandfather, maternal grandmother, and sister.    Social History     Tobacco Use    Smoking status: Former Smoker     Packs/day: 1.00     Types: Cigarettes     Start date: 3/1/1998     Quit date: 2018     Years since quittin.0    Smokeless tobacco: Never Used   Substance Use Topics    Alcohol use: No     Frequency: Monthly or less     Drinks per session: 1 or 2     Binge frequency: Never    Drug use: No       Review of Systems   Constitutional: Negative for chills and fever.   HENT: Negative for congestion, hearing loss and sore throat.    Eyes: Negative for blurred vision and discharge.   Respiratory: Negative for cough, shortness of breath and wheezing.    Cardiovascular: Negative for chest pain and palpitations.   Gastrointestinal: Negative for blood in stool, constipation, diarrhea, nausea and vomiting.   Genitourinary: Negative for dysuria and hematuria.   Musculoskeletal: Negative for falls, myalgias and neck pain.   Skin: Negative for itching and rash.   Neurological: Negative for dizziness, weakness and headaches.   Endo/Heme/Allergies: Negative for polydipsia.        Outpatient Encounter Medications as of 9/10/2020    Medication Sig Dispense Refill    CIPRODEX 0.3-0.1 % DrpS INSTILL 4 DROPS INTO AFFECTED EAR BID      furosemide (LASIX) 40 MG tablet Take 1.5 tablets (60 mg total) by mouth daily as needed. 135 tablet 3    Lactobac no.41/Bifidobact no.7 (PROBIOTIC-10 ORAL)       lifitegrast (XIIDRA) 5 % Dpet Apply 1 drop to eye every 12 (twelve) hours. 180 each 4    linaCLOtide (LINZESS) 290 mcg Cap capsule Take 1 capsule (290 mcg total) by mouth once daily. 90 capsule 3    loteprednol (LOTEMAX) 0.5 % ophthalmic suspension Place 1 drop into both eyes 4 (four) times daily. QID x 4 days, then BID x 1 week, then QD x 1 week 5 mL 1    olopatadine (PAZEO) 0.7 % Drop Apply 1 drop to eye once daily. 7.5 mL 4    pantoprazole (PROTONIX) 40 MG tablet Take 1 tablet (40 mg total) by mouth once daily. 90 tablet 3    phentermine 37.5 MG capsule Take 37.5 mg by mouth every morning.      rosuvastatin (CRESTOR) 20 MG tablet Take 1 tablet (20 mg total) by mouth once daily. 90 tablet 3    SITagliptan-metformin (JANUMET)  mg per tablet Take 1 tablet by mouth 2 (two) times daily. 180 tablet 3    triamcinolone acetonide 0.1% (KENALOG) 0.1 % cream MARQUEZ AA BID  0    vilazodone (VIIBRYD) 20 mg Tab Take 1/2 tablet daily for 1 week, then increase to 1 tablet by mouth daily. 30 tablet 11    [DISCONTINUED] SITagliptan-metformin (JANUMET)  mg per tablet Take 1 tablet by mouth 2 (two) times daily. 180 tablet 3    [DISCONTINUED] sulfamethoxazole-trimethoprim 800-160mg (BACTRIM DS) 800-160 mg Tab Take 1 tablet by mouth 2 (two) times daily. (Patient not taking: Reported on 6/1/2020) 14 tablet 0     No facility-administered encounter medications on file as of 9/10/2020.         Review of patient's allergies indicates:   Allergen Reactions    Doxycycline Anaphylaxis and Other (See Comments)       Physical Exam      Vital Signs  BP: 130/86]  No vital signs taken by me as this is a virtual visit.  Any reported are patient's home  measurements.    Physical Exam  Constitutional:       General: She is not in acute distress.     Appearance: She is well-developed.   HENT:      Head: Normocephalic and atraumatic.   Neck:      Musculoskeletal: Normal range of motion.   Pulmonary:      Effort: Pulmonary effort is normal. No respiratory distress.   Skin:     Findings: No rash.   Neurological:      Mental Status: She is alert and oriented to person, place, and time.      Coordination: Coordination normal.   Psychiatric:         Behavior: Behavior normal.         Thought Content: Thought content normal.         Judgment: Judgment normal.          Laboratory:  CBC:  No results for input(s): WBC, RBC, HGB, HCT, PLT, MCV, MCH, MCHC in the last 2160 hours.  CMP:  No results for input(s): GLU, CALCIUM, ALBUMIN, PROT, NA, K, CO2, CL, BUN, ALKPHOS, ALT, AST, BILITOT in the last 2160 hours.    Invalid input(s): CREATININ  URINALYSIS:  No results for input(s): COLORU, CLARITYU, SPECGRAV, PHUR, PROTEINUA, GLUCOSEU, BILIRUBINCON, BLOODU, WBCU, RBCU, BACTERIA, MUCUS, NITRITE, LEUKOCYTESUR, UROBILINOGEN, HYALINECASTS in the last 2160 hours.   LIPIDS:  No results for input(s): TSH, HDL, CHOL, TRIG, LDLCALC, CHOLHDL, NONHDLCHOL, TOTALCHOLEST in the last 2160 hours.  TSH:  No results for input(s): TSH in the last 2160 hours.  A1C:  No results for input(s): HGBA1C in the last 2160 hours.    Radiology:  No new imaging    Assessment/Plan     Sanjana Queen is a 40 y.o.female with:    1. Type 2 diabetes mellitus without complication, without long-term current use of insulin  - CBC auto differential; Future  - Comprehensive metabolic panel; Future  - Lipid Panel; Future  - Microalbumin/creatinine urine ratio; Future  - Hemoglobin A1C; Future  - SITagliptan-metformin (JANUMET)  mg per tablet; Take 1 tablet by mouth 2 (two) times daily.  Dispense: 180 tablet; Refill: 3    2. Acquired hypothyroidism  - TSH; Future  - T4, free; Future    3. Hyperlipidemia due to type 2  diabetes mellitus    4. Hypertension associated with diabetes    5. Mild intermittent asthma without complication    6. Depression with anxiety    7. Other constipation    8. Fluid retention    9. Lymphadenopathy  - Lactate Dehydrogenase; Future    10. Gastroesophageal reflux disease without esophagitis    11. Fibroadenoma of left breast    12. Screening for viral disease  - Hepatitis C Antibody; Future    13. Screening for HIV (human immunodeficiency virus)  - HIV 1/2 Ag/Ab (4th Gen); Future    14. Morbid obesity with BMI of 50.0-59.9, adult    -labs ordered  -counseled on getting flu vaccine this year  -Try taking Viibryd at night to help with nausea  -Continue current medications and maintain follow up with specialists.  Return to clinic in 3 months.      Gisselle Tavera MD  [facility]                Answers for HPI/ROS submitted by the patient on 9/9/2020   activity change: Yes  unexpected weight change: No  rhinorrhea: No  trouble swallowing: No  visual disturbance: No  chest tightness: No  polyuria: No  difficulty urinating: No  menstrual problem: No  joint swelling: No  arthralgias: No  confusion: No  dysphoric mood: Yes

## 2020-09-10 NOTE — ASSESSMENT & PLAN NOTE
Gets a little nauseated after taking Viibryd in AM.  Anxiety has been much better since starting.  Was on wellbutrin and lexapro in past. No SI/HI/panic attacks.

## 2020-09-11 ENCOUNTER — TELEPHONE (OUTPATIENT)
Dept: ADMINISTRATIVE | Facility: HOSPITAL | Age: 40
End: 2020-09-11

## 2020-09-16 ENCOUNTER — TELEPHONE (OUTPATIENT)
Dept: ADMINISTRATIVE | Facility: HOSPITAL | Age: 40
End: 2020-09-16

## 2020-09-16 ENCOUNTER — PATIENT OUTREACH (OUTPATIENT)
Dept: ADMINISTRATIVE | Facility: HOSPITAL | Age: 40
End: 2020-09-16

## 2020-12-18 ENCOUNTER — OFFICE VISIT (OUTPATIENT)
Dept: FAMILY MEDICINE | Facility: CLINIC | Age: 40
End: 2020-12-18
Payer: COMMERCIAL

## 2020-12-18 VITALS
HEIGHT: 63 IN | HEART RATE: 76 BPM | SYSTOLIC BLOOD PRESSURE: 140 MMHG | RESPIRATION RATE: 16 BRPM | WEIGHT: 250.44 LBS | BODY MASS INDEX: 44.38 KG/M2 | TEMPERATURE: 98 F | OXYGEN SATURATION: 98 % | DIASTOLIC BLOOD PRESSURE: 80 MMHG

## 2020-12-18 DIAGNOSIS — K59.09 OTHER CONSTIPATION: ICD-10-CM

## 2020-12-18 DIAGNOSIS — G25.81 RESTLESS LEG SYNDROME: ICD-10-CM

## 2020-12-18 DIAGNOSIS — E11.69 HYPERLIPIDEMIA DUE TO TYPE 2 DIABETES MELLITUS: ICD-10-CM

## 2020-12-18 DIAGNOSIS — J45.20 MILD INTERMITTENT ASTHMA WITHOUT COMPLICATION: ICD-10-CM

## 2020-12-18 DIAGNOSIS — E03.9 ACQUIRED HYPOTHYROIDISM: ICD-10-CM

## 2020-12-18 DIAGNOSIS — E11.59 HYPERTENSION ASSOCIATED WITH DIABETES: ICD-10-CM

## 2020-12-18 DIAGNOSIS — R60.9 FLUID RETENTION: ICD-10-CM

## 2020-12-18 DIAGNOSIS — E11.9 TYPE 2 DIABETES MELLITUS WITHOUT COMPLICATION, WITHOUT LONG-TERM CURRENT USE OF INSULIN: ICD-10-CM

## 2020-12-18 DIAGNOSIS — E78.5 HYPERLIPIDEMIA DUE TO TYPE 2 DIABETES MELLITUS: ICD-10-CM

## 2020-12-18 DIAGNOSIS — I15.2 HYPERTENSION ASSOCIATED WITH DIABETES: ICD-10-CM

## 2020-12-18 DIAGNOSIS — F41.8 DEPRESSION WITH ANXIETY: ICD-10-CM

## 2020-12-18 DIAGNOSIS — K21.9 GASTROESOPHAGEAL REFLUX DISEASE WITHOUT ESOPHAGITIS: ICD-10-CM

## 2020-12-18 PROCEDURE — 99214 OFFICE O/P EST MOD 30 MIN: CPT | Mod: S$GLB,,, | Performed by: INTERNAL MEDICINE

## 2020-12-18 PROCEDURE — 3044F PR MOST RECENT HEMOGLOBIN A1C LEVEL <7.0%: ICD-10-PCS | Mod: CPTII,S$GLB,, | Performed by: INTERNAL MEDICINE

## 2020-12-18 PROCEDURE — 3077F SYST BP >= 140 MM HG: CPT | Mod: CPTII,S$GLB,, | Performed by: INTERNAL MEDICINE

## 2020-12-18 PROCEDURE — 99999 PR PBB SHADOW E&M-EST. PATIENT-LVL III: ICD-10-PCS | Mod: PBBFAC,,, | Performed by: INTERNAL MEDICINE

## 2020-12-18 PROCEDURE — 3044F HG A1C LEVEL LT 7.0%: CPT | Mod: CPTII,S$GLB,, | Performed by: INTERNAL MEDICINE

## 2020-12-18 PROCEDURE — 99214 PR OFFICE/OUTPT VISIT, EST, LEVL IV, 30-39 MIN: ICD-10-PCS | Mod: S$GLB,,, | Performed by: INTERNAL MEDICINE

## 2020-12-18 PROCEDURE — 3079F DIAST BP 80-89 MM HG: CPT | Mod: CPTII,S$GLB,, | Performed by: INTERNAL MEDICINE

## 2020-12-18 PROCEDURE — 3077F PR MOST RECENT SYSTOLIC BLOOD PRESSURE >= 140 MM HG: ICD-10-PCS | Mod: CPTII,S$GLB,, | Performed by: INTERNAL MEDICINE

## 2020-12-18 PROCEDURE — 3008F PR BODY MASS INDEX (BMI) DOCUMENTED: ICD-10-PCS | Mod: CPTII,S$GLB,, | Performed by: INTERNAL MEDICINE

## 2020-12-18 PROCEDURE — 3008F BODY MASS INDEX DOCD: CPT | Mod: CPTII,S$GLB,, | Performed by: INTERNAL MEDICINE

## 2020-12-18 PROCEDURE — 3079F PR MOST RECENT DIASTOLIC BLOOD PRESSURE 80-89 MM HG: ICD-10-PCS | Mod: CPTII,S$GLB,, | Performed by: INTERNAL MEDICINE

## 2020-12-18 PROCEDURE — 99999 PR PBB SHADOW E&M-EST. PATIENT-LVL III: CPT | Mod: PBBFAC,,, | Performed by: INTERNAL MEDICINE

## 2020-12-18 RX ORDER — SITAGLIPTIN AND METFORMIN HYDROCHLORIDE 500; 50 MG/1; MG/1
1 TABLET, FILM COATED ORAL 2 TIMES DAILY
Qty: 180 TABLET | Refills: 3 | Status: SHIPPED | OUTPATIENT
Start: 2020-12-18 | End: 2021-09-28 | Stop reason: ALTCHOICE

## 2020-12-18 RX ORDER — PREDNISONE 20 MG/1
40 TABLET ORAL DAILY
COMMUNITY
Start: 2020-12-15 | End: 2021-06-25

## 2020-12-18 RX ORDER — VALSARTAN 80 MG/1
80 TABLET ORAL DAILY
Qty: 90 TABLET | Refills: 3 | Status: SHIPPED | OUTPATIENT
Start: 2020-12-18 | End: 2021-01-05 | Stop reason: SDUPTHER

## 2020-12-18 RX ORDER — SULFAMETHOXAZOLE AND TRIMETHOPRIM 800; 160 MG/1; MG/1
1 TABLET ORAL 2 TIMES DAILY
COMMUNITY
Start: 2020-12-15 | End: 2021-06-25

## 2020-12-18 RX ORDER — PANTOPRAZOLE SODIUM 40 MG/1
40 TABLET, DELAYED RELEASE ORAL DAILY
Qty: 90 TABLET | Refills: 3 | Status: SHIPPED | OUTPATIENT
Start: 2020-12-18 | End: 2022-03-08

## 2020-12-18 NOTE — ASSESSMENT & PLAN NOTE
LDL high in 9/2020 but had forgot to take Crestor for a while prior to those labs. Will repeat labs.  On crestor 20 mg daily.

## 2020-12-18 NOTE — ASSESSMENT & PLAN NOTE
Stable on Viibryd. Anxiety has been much better since starting.  Was on wellbutrin and lexapro in past. No SI/HI/panic attacks.

## 2020-12-18 NOTE — PROGRESS NOTES
[facility] Note    Chief Complaint      Chief Complaint   Patient presents with    Follow-up    Hypertension     History of Present Illness      Sanjana Queen is a 40 y.o. female who presents today for follow up DM2.  Patient comes to appointment alone.    Problem List Items Addressed This Visit     Asthma, mild intermittent    Current Assessment & Plan     Sees Dr. Alexa Glover, allergist.  Stable on symbicort.         GERD (gastroesophageal reflux disease)    Current Assessment & Plan     Stable on protonix, no nausea/reflux.  Had hiatal hernia repaired 2/2020.         Relevant Medications    pantoprazole (PROTONIX) 40 MG tablet    Hypothyroid    Current Assessment & Plan     Thyroid labs WNL in 9/2020. Not on meds.  Has been feeling very fatigued lately, has no energy.  Has been walking.         Relevant Orders    TSH    T4, Free    Depression with anxiety    Current Assessment & Plan     Stable on Viibryd. Anxiety has been much better since starting.  Was on wellbutrin and lexapro in past. No SI/HI/panic attacks.            Other constipation    Current Assessment & Plan     Stable on linzess and milk of mag PRN.  Works well for her.         Type 2 diabetes mellitus without complication, without long-term current use of insulin    Current Assessment & Plan     A1C 5.3 in 9/2020 on Janumet. No hypoglycemia.  Hasn't been checking glucoses recently.         Relevant Medications    SITagliptan-metformin (JANUMET)  mg per tablet    Other Relevant Orders    Lipid Panel    Hemoglobin A1C    Hyperlipidemia due to type 2 diabetes mellitus    Current Assessment & Plan     LDL high in 9/2020 but had forgot to take Crestor for a while prior to those labs. Will repeat labs.  On crestor 20 mg daily.         Relevant Medications    SITagliptan-metformin (JANUMET)  mg per tablet    Fluid retention    Current Assessment & Plan     Still has on occasion, lasix helps. Really cut down on  her sodium which helped.         Hypertension associated with diabetes    Current Assessment & Plan     At Allergy visits, BP has been elevated. BP when seen for bronchitis at [facility] [place] was 191/99. BP at home has been 130-140/80-90.  Has been watching salt.         Relevant Medications    valsartan (DIOVAN) 80 MG tablet    SITagliptan-metformin (JANUMET)  mg per tablet    Restless leg syndrome    Current Assessment & Plan     Getting worse here lately, has to continue moving legs to relieve pain.               Health Maintenance   Topic Date Due    TETANUS VACCINE  1999    Hemoglobin A1c  2021    Eye Exam  05/15/2021    Lipid Panel  2021    Foot Exam  2021    Hepatitis C Screening  Completed    Pneumococcal Vaccine (Medium Risk)  Completed       Past Medical History:   Diagnosis Date    Anemia     Asthma     Cardiomyopathy, peripartum     GERD (gastroesophageal reflux disease)     Hypertension     Hypothyroidism     not at present    Morbid obesity     Peripartum cardiomyopathy 2015    Snoring        Past Surgical History:   Procedure Laterality Date    BLADDER SURGERY      DILATION AND CURETTAGE OF UTERUS      ENDOMETRIAL ABLATION      FRACTURE SURGERY      gastric sleeve N/A     hernia repair 2020      spleen surgery      TONSILLECTOMY      TUBAL LIGATION         family history includes Cancer in her maternal grandmother; Diabetes in her father and mother; Heart disease in her father and maternal grandfather; Hypertension in her father, maternal grandfather, maternal grandmother, and sister.    Social History     Tobacco Use    Smoking status: Former Smoker     Packs/day: 1.00     Types: Cigarettes     Start date: 3/1/1998     Quit date: 2018     Years since quittin.2    Smokeless tobacco: Never Used   Substance Use Topics    Alcohol use: No     Frequency: Monthly or less     Drinks per session: 1 or 2     Binge frequency: Never    Drug use: No        Review of Systems   Constitutional: Negative for chills and fever.   HENT: Negative for congestion, hearing loss and sore throat.    Eyes: Negative for blurred vision and discharge.   Respiratory: Negative for cough, shortness of breath and wheezing.    Cardiovascular: Negative for chest pain and palpitations.   Gastrointestinal: Negative for blood in stool, constipation, diarrhea, nausea and vomiting.   Genitourinary: Negative for dysuria and hematuria.   Musculoskeletal: Negative for falls, myalgias and neck pain.   Skin: Negative for itching and rash.   Neurological: Negative for dizziness, weakness and headaches.   Endo/Heme/Allergies: Negative for polydipsia.        Outpatient Encounter Medications as of 12/18/2020   Medication Sig Dispense Refill    furosemide (LASIX) 40 MG tablet Take 1.5 tablets (60 mg total) by mouth daily as needed. 135 tablet 3    Lactobac no.41/Bifidobact no.7 (PROBIOTIC-10 ORAL)       lifitegrast (XIIDRA) 5 % Dpet Apply 1 drop to eye every 12 (twelve) hours. 180 each 4    linaCLOtide (LINZESS) 290 mcg Cap capsule Take 1 capsule (290 mcg total) by mouth once daily. 90 capsule 3    loteprednol (LOTEMAX) 0.5 % ophthalmic suspension Place 1 drop into both eyes 4 (four) times daily. QID x 4 days, then BID x 1 week, then QD x 1 week 5 mL 1    olopatadine (PAZEO) 0.7 % Drop Apply 1 drop to eye once daily. 7.5 mL 4    pantoprazole (PROTONIX) 40 MG tablet Take 1 tablet (40 mg total) by mouth once daily. 90 tablet 3    predniSONE (DELTASONE) 20 MG tablet Take 40 mg by mouth once daily.      rosuvastatin (CRESTOR) 20 MG tablet Take 1 tablet (20 mg total) by mouth once daily. 90 tablet 3    SITagliptan-metformin (JANUMET)  mg per tablet Take 1 tablet by mouth 2 (two) times daily. 180 tablet 3    sulfamethoxazole-trimethoprim 800-160mg (BACTRIM DS) 800-160 mg Tab Take 1 tablet by mouth 2 (two) times daily.      triamcinolone acetonide 0.1% (KENALOG) 0.1 % cream MRAQUEZ AA BID  0  "   vilazodone (VIIBRYD) 20 mg Tab Take 1/2 tablet daily for 1 week, then increase to 1 tablet by mouth daily. 30 tablet 11    [DISCONTINUED] pantoprazole (PROTONIX) 40 MG tablet Take 1 tablet (40 mg total) by mouth once daily. 90 tablet 3    [DISCONTINUED] SITagliptan-metformin (JANUMET)  mg per tablet Take 1 tablet by mouth 2 (two) times daily. 180 tablet 3    valsartan (DIOVAN) 80 MG tablet Take 1 tablet (80 mg total) by mouth once daily. 90 tablet 3    [DISCONTINUED] CIPRODEX 0.3-0.1 % DrpS INSTILL 4 DROPS INTO AFFECTED EAR BID      [DISCONTINUED] phentermine 37.5 MG capsule Take 37.5 mg by mouth every morning.       No facility-administered encounter medications on file as of 12/18/2020.         Review of patient's allergies indicates:   Allergen Reactions    Doxycycline Anaphylaxis and Other (See Comments)       Physical Exam      Vital Signs  Temp: 97.8 °F (36.6 °C)  Temp src: Temporal  Pulse: 76  Resp: 16  SpO2: 98 %  BP: (!) 140/80  BP Location: Right arm  Patient Position: Sitting  Height and Weight  Height: 5' 3" (160 cm)  Weight: 113.6 kg (250 lb 7.1 oz)  BSA (Calculated - sq m): 2.25 sq meters  BMI (Calculated): 44.4  Weight in (lb) to have BMI = 25: 140.8]      Physical Exam  Constitutional:       General: She is not in acute distress.     Appearance: She is well-developed.   HENT:      Head: Normocephalic and atraumatic.   Neck:      Musculoskeletal: Normal range of motion.   Cardiovascular:      Pulses:           Dorsalis pedis pulses are 2+ on the right side and 2+ on the left side.   Pulmonary:      Effort: Pulmonary effort is normal. No respiratory distress.   Musculoskeletal:      Right foot: Normal range of motion. No deformity.      Left foot: Normal range of motion. No deformity.   Feet:      Right foot:      Protective Sensation: 5 sites tested. 5 sites sensed.      Skin integrity: No ulcer or blister.      Left foot:      Protective Sensation: 5 sites tested. 5 sites sensed.      " Skin integrity: No ulcer or blister.   Skin:     Findings: No rash.   Neurological:      Mental Status: She is alert and oriented to person, place, and time.      Coordination: Coordination normal.   Psychiatric:         Behavior: Behavior normal.         Thought Content: Thought content normal.         Judgment: Judgment normal.          Laboratory:  CBC:  No results for input(s): WBC, RBC, HGB, HCT, PLT, MCV, MCH, MCHC in the last 2160 hours.  CMP:  No results for input(s): GLU, CALCIUM, ALBUMIN, PROT, NA, K, CO2, CL, BUN, ALKPHOS, ALT, AST, BILITOT in the last 2160 hours.    Invalid input(s): CREATININ  URINALYSIS:  No results for input(s): COLORU, CLARITYU, SPECGRAV, PHUR, PROTEINUA, GLUCOSEU, BILIRUBINCON, BLOODU, WBCU, RBCU, BACTERIA, MUCUS, NITRITE, LEUKOCYTESUR, UROBILINOGEN, HYALINECASTS in the last 2160 hours.   LIPIDS:  No results for input(s): TSH, HDL, CHOL, TRIG, LDLCALC, CHOLHDL, NONHDLCHOL, TOTALCHOLEST in the last 2160 hours.  TSH:  No results for input(s): TSH in the last 2160 hours.  A1C:  No results for input(s): HGBA1C in the last 2160 hours.    Radiology:  No new imaging    Assessment/Plan     Sanjana Quene is a 40 y.o.female with:    1. Type 2 diabetes mellitus without complication, without long-term current use of insulin  - Lipid Panel; Future  - Hemoglobin A1C; Future  - SITagliptan-metformin (JANUMET)  mg per tablet; Take 1 tablet by mouth 2 (two) times daily.  Dispense: 180 tablet; Refill: 3    2. Acquired hypothyroidism  - TSH; Future  - T4, Free; Future    3. Hypertension associated with diabetes  - valsartan (DIOVAN) 80 MG tablet; Take 1 tablet (80 mg total) by mouth once daily.  Dispense: 90 tablet; Refill: 3    4. Hyperlipidemia due to type 2 diabetes mellitus    5. Mild intermittent asthma without complication    6. Depression with anxiety    7. Fluid retention    8. Restless leg syndrome    9. Gastroesophageal reflux disease without esophagitis  - pantoprazole (PROTONIX) 40  MG tablet; Take 1 tablet (40 mg total) by mouth once daily.  Dispense: 90 tablet; Refill: 3    10. Other constipation     -labs ordered  -Start valsartan 80 mg daily, BP check in 1 week  -Continue current medications and maintain follow up with specialists.  Return to clinic in 6 months.      Gisselle Tavera MD  [facility]    Answers for HPI/ROS submitted by the patient on 12/16/2020   activity change: Yes  unexpected weight change: Yes  rhinorrhea: No  trouble swallowing: No  visual disturbance: No  chest tightness: No  polyuria: No  difficulty urinating: No  menstrual problem: No  joint swelling: No  arthralgias: No  confusion: No  dysphoric mood: No

## 2020-12-18 NOTE — ASSESSMENT & PLAN NOTE
Thyroid labs WNL in 9/2020. Not on meds.  Has been feeling very fatigued lately, has no energy.  Has been walking.

## 2020-12-18 NOTE — ASSESSMENT & PLAN NOTE
At Allergy visits, BP has been elevated. BP when seen for bronchitis at [facility] was 191/99. BP at home has been 130-140/80-90.  Has been watching salt.

## 2020-12-23 ENCOUNTER — CLINICAL SUPPORT (OUTPATIENT)
Dept: FAMILY MEDICINE | Facility: CLINIC | Age: 40
End: 2020-12-23
Payer: COMMERCIAL

## 2020-12-23 ENCOUNTER — PATIENT MESSAGE (OUTPATIENT)
Dept: FAMILY MEDICINE | Facility: CLINIC | Age: 40
End: 2020-12-23

## 2020-12-23 VITALS
OXYGEN SATURATION: 96 % | TEMPERATURE: 98 F | DIASTOLIC BLOOD PRESSURE: 76 MMHG | HEART RATE: 78 BPM | SYSTOLIC BLOOD PRESSURE: 136 MMHG

## 2020-12-23 PROCEDURE — 99999 PR PBB SHADOW E&M-EST. PATIENT-LVL II: ICD-10-PCS | Mod: PBBFAC,,,

## 2020-12-23 PROCEDURE — 99999 PR PBB SHADOW E&M-EST. PATIENT-LVL II: CPT | Mod: PBBFAC,,,

## 2020-12-23 NOTE — PROGRESS NOTES
aSnjana Queen 40 y.o. female is here today for Blood Pressure check.   History of HTN yes.    Review of patient's allergies indicates:   Allergen Reactions    Doxycycline Anaphylaxis and Other (See Comments)     Creatinine   Date Value Ref Range Status   09/11/2020 0.56 0.50 - 1.40 mg/dL Final     Sodium   Date Value Ref Range Status   09/11/2020 144 136 - 145 mmol/L Final     Potassium   Date Value Ref Range Status   09/11/2020 4.2 3.5 - 5.1 mmol/L Final   ]  Patient verifies taking blood pressure medications on a regular basis at the same time of the day.     Current Outpatient Medications:     furosemide (LASIX) 40 MG tablet, Take 1.5 tablets (60 mg total) by mouth daily as needed., Disp: 135 tablet, Rfl: 3    Lactobac no.41/Bifidobact no.7 (PROBIOTIC-10 ORAL), , Disp: , Rfl:     lifitegrast (XIIDRA) 5 % Dpet, Apply 1 drop to eye every 12 (twelve) hours., Disp: 180 each, Rfl: 4    linaCLOtide (LINZESS) 290 mcg Cap capsule, Take 1 capsule (290 mcg total) by mouth once daily., Disp: 90 capsule, Rfl: 3    loteprednol (LOTEMAX) 0.5 % ophthalmic suspension, Place 1 drop into both eyes 4 (four) times daily. QID x 4 days, then BID x 1 week, then QD x 1 week, Disp: 5 mL, Rfl: 1    olopatadine (PAZEO) 0.7 % Drop, Apply 1 drop to eye once daily., Disp: 7.5 mL, Rfl: 4    pantoprazole (PROTONIX) 40 MG tablet, Take 1 tablet (40 mg total) by mouth once daily., Disp: 90 tablet, Rfl: 3    predniSONE (DELTASONE) 20 MG tablet, Take 40 mg by mouth once daily., Disp: , Rfl:     rosuvastatin (CRESTOR) 20 MG tablet, Take 1 tablet (20 mg total) by mouth once daily., Disp: 90 tablet, Rfl: 3    SITagliptan-metformin (JANUMET)  mg per tablet, Take 1 tablet by mouth 2 (two) times daily., Disp: 180 tablet, Rfl: 3    sulfamethoxazole-trimethoprim 800-160mg (BACTRIM DS) 800-160 mg Tab, Take 1 tablet by mouth 2 (two) times daily., Disp: , Rfl:     triamcinolone acetonide 0.1% (KENALOG) 0.1 % cream, MARQUEZ AA BID, Disp: , Rfl:  0    valsartan (DIOVAN) 80 MG tablet, Take 1 tablet (80 mg total) by mouth once daily., Disp: 90 tablet, Rfl: 3    vilazodone (VIIBRYD) 20 mg Tab, Take 1/2 tablet daily for 1 week, then increase to 1 tablet by mouth daily., Disp: 30 tablet, Rfl: 11  Does patient have record of home blood pressure readings no. Readings have been averaging na.   Last dose of blood pressure medication was taken at yesterday.  Patient is asymptomatic.   Complains of na.    BP: 136/76 , Pulse: 78 .    Dr. Tavera notified.     Patient did not take medication this morning because of fasting for blood work. Dr. Tavera notified and advised patient to get a blood pressure cuff and machine and monitor blood pressure at home.

## 2021-01-04 ENCOUNTER — PATIENT MESSAGE (OUTPATIENT)
Dept: FAMILY MEDICINE | Facility: CLINIC | Age: 41
End: 2021-01-04

## 2021-01-05 ENCOUNTER — PATIENT MESSAGE (OUTPATIENT)
Dept: FAMILY MEDICINE | Facility: CLINIC | Age: 41
End: 2021-01-05

## 2021-01-05 DIAGNOSIS — G25.81 RESTLESS LEG SYNDROME: Primary | ICD-10-CM

## 2021-01-05 DIAGNOSIS — E11.59 HYPERTENSION ASSOCIATED WITH DIABETES: ICD-10-CM

## 2021-01-05 DIAGNOSIS — I15.2 HYPERTENSION ASSOCIATED WITH DIABETES: ICD-10-CM

## 2021-01-05 RX ORDER — ROPINIROLE 0.25 MG/1
0.25 TABLET, FILM COATED ORAL NIGHTLY
Qty: 30 TABLET | Refills: 11 | Status: SHIPPED | OUTPATIENT
Start: 2021-01-05 | End: 2021-01-12 | Stop reason: SDUPTHER

## 2021-01-05 RX ORDER — VALSARTAN 80 MG/1
80 TABLET ORAL DAILY
Qty: 90 TABLET | Refills: 3 | Status: SHIPPED | OUTPATIENT
Start: 2021-01-05 | End: 2022-02-07

## 2021-01-12 ENCOUNTER — PATIENT MESSAGE (OUTPATIENT)
Dept: FAMILY MEDICINE | Facility: CLINIC | Age: 41
End: 2021-01-12

## 2021-01-12 DIAGNOSIS — E78.49 OTHER HYPERLIPIDEMIA: ICD-10-CM

## 2021-01-12 DIAGNOSIS — G25.81 RESTLESS LEG SYNDROME: ICD-10-CM

## 2021-01-12 RX ORDER — ROSUVASTATIN CALCIUM 20 MG/1
20 TABLET, COATED ORAL DAILY
Qty: 90 TABLET | Refills: 3 | Status: SHIPPED | OUTPATIENT
Start: 2021-01-12 | End: 2021-12-17 | Stop reason: SDUPTHER

## 2021-01-12 RX ORDER — ROPINIROLE 0.5 MG/1
0.5 TABLET, FILM COATED ORAL NIGHTLY
Qty: 90 TABLET | Refills: 3 | Status: SHIPPED | OUTPATIENT
Start: 2021-01-12 | End: 2022-05-09

## 2021-01-21 ENCOUNTER — TELEPHONE (OUTPATIENT)
Dept: OPHTHALMOLOGY | Facility: CLINIC | Age: 41
End: 2021-01-21

## 2021-01-21 ENCOUNTER — PATIENT MESSAGE (OUTPATIENT)
Dept: OPTOMETRY | Facility: CLINIC | Age: 41
End: 2021-01-21

## 2021-01-25 ENCOUNTER — PATIENT MESSAGE (OUTPATIENT)
Dept: OPTOMETRY | Facility: CLINIC | Age: 41
End: 2021-01-25

## 2021-02-01 ENCOUNTER — TELEPHONE (OUTPATIENT)
Dept: PHARMACY | Facility: CLINIC | Age: 41
End: 2021-02-01

## 2021-02-25 ENCOUNTER — PATIENT MESSAGE (OUTPATIENT)
Dept: FAMILY MEDICINE | Facility: CLINIC | Age: 41
End: 2021-02-25

## 2021-03-18 ENCOUNTER — PATIENT MESSAGE (OUTPATIENT)
Dept: FAMILY MEDICINE | Facility: CLINIC | Age: 41
End: 2021-03-18

## 2021-03-18 DIAGNOSIS — R60.9 FLUID RETENTION: ICD-10-CM

## 2021-03-19 ENCOUNTER — PATIENT MESSAGE (OUTPATIENT)
Dept: FAMILY MEDICINE | Facility: CLINIC | Age: 41
End: 2021-03-19

## 2021-03-19 RX ORDER — VILAZODONE HYDROCHLORIDE 40 MG/1
40 TABLET ORAL DAILY
Qty: 90 TABLET | Refills: 3 | Status: SHIPPED | OUTPATIENT
Start: 2021-03-19 | End: 2022-03-25

## 2021-03-19 RX ORDER — PHENTERMINE HYDROCHLORIDE 37.5 MG/1
37.5 TABLET ORAL
Qty: 30 TABLET | Refills: 0 | Status: SHIPPED | OUTPATIENT
Start: 2021-03-19 | End: 2021-04-18

## 2021-03-19 RX ORDER — FUROSEMIDE 40 MG/1
60 TABLET ORAL DAILY PRN
Qty: 135 TABLET | Refills: 3 | Status: SHIPPED | OUTPATIENT
Start: 2021-03-19 | End: 2022-05-17 | Stop reason: SDUPTHER

## 2021-04-28 ENCOUNTER — TELEPHONE (OUTPATIENT)
Dept: ADMINISTRATIVE | Facility: HOSPITAL | Age: 41
End: 2021-04-28

## 2021-05-26 DIAGNOSIS — E11.9 TYPE 2 DIABETES MELLITUS WITHOUT COMPLICATION, UNSPECIFIED WHETHER LONG TERM INSULIN USE: ICD-10-CM

## 2021-06-21 ENCOUNTER — PATIENT MESSAGE (OUTPATIENT)
Dept: FAMILY MEDICINE | Facility: CLINIC | Age: 41
End: 2021-06-21

## 2021-06-21 DIAGNOSIS — E11.9 TYPE 2 DIABETES MELLITUS WITHOUT COMPLICATION, WITHOUT LONG-TERM CURRENT USE OF INSULIN: Primary | ICD-10-CM

## 2021-06-21 DIAGNOSIS — E03.9 ACQUIRED HYPOTHYROIDISM: ICD-10-CM

## 2021-06-23 DIAGNOSIS — E11.9 TYPE 2 DIABETES MELLITUS WITHOUT COMPLICATION, UNSPECIFIED WHETHER LONG TERM INSULIN USE: ICD-10-CM

## 2021-06-24 ENCOUNTER — LAB VISIT (OUTPATIENT)
Dept: LAB | Facility: HOSPITAL | Age: 41
End: 2021-06-24
Attending: INTERNAL MEDICINE
Payer: COMMERCIAL

## 2021-06-24 DIAGNOSIS — E11.9 TYPE 2 DIABETES MELLITUS WITHOUT COMPLICATION, WITHOUT LONG-TERM CURRENT USE OF INSULIN: ICD-10-CM

## 2021-06-24 DIAGNOSIS — E03.9 ACQUIRED HYPOTHYROIDISM: ICD-10-CM

## 2021-06-24 LAB
ALBUMIN SERPL BCP-MCNC: 3.6 G/DL (ref 3.5–5.2)
ALP SERPL-CCNC: 78 U/L (ref 55–135)
ALT SERPL W/O P-5'-P-CCNC: 22 U/L (ref 10–44)
ANION GAP SERPL CALC-SCNC: 11 MMOL/L (ref 8–16)
AST SERPL-CCNC: 16 U/L (ref 10–40)
BILIRUB SERPL-MCNC: 0.3 MG/DL (ref 0.1–1)
BUN SERPL-MCNC: 11 MG/DL (ref 6–20)
CALCIUM SERPL-MCNC: 9 MG/DL (ref 8.7–10.5)
CHLORIDE SERPL-SCNC: 106 MMOL/L (ref 95–110)
CO2 SERPL-SCNC: 25 MMOL/L (ref 23–29)
CREAT SERPL-MCNC: 0.6 MG/DL (ref 0.5–1.4)
EST. GFR  (AFRICAN AMERICAN): >60 ML/MIN/1.73 M^2
EST. GFR  (NON AFRICAN AMERICAN): >60 ML/MIN/1.73 M^2
ESTIMATED AVG GLUCOSE: 123 MG/DL (ref 68–131)
GLUCOSE SERPL-MCNC: 115 MG/DL (ref 70–110)
HBA1C MFR BLD: 5.9 % (ref 4–5.6)
POTASSIUM SERPL-SCNC: 3.9 MMOL/L (ref 3.5–5.1)
PROT SERPL-MCNC: 7.5 G/DL (ref 6–8.4)
SODIUM SERPL-SCNC: 142 MMOL/L (ref 136–145)
T4 FREE SERPL-MCNC: 0.8 NG/DL (ref 0.71–1.51)
TSH SERPL DL<=0.005 MIU/L-ACNC: 1.22 UIU/ML (ref 0.4–4)

## 2021-06-24 PROCEDURE — 83036 HEMOGLOBIN GLYCOSYLATED A1C: CPT | Performed by: INTERNAL MEDICINE

## 2021-06-24 PROCEDURE — 84439 ASSAY OF FREE THYROXINE: CPT | Performed by: INTERNAL MEDICINE

## 2021-06-24 PROCEDURE — 36415 COLL VENOUS BLD VENIPUNCTURE: CPT | Performed by: INTERNAL MEDICINE

## 2021-06-24 PROCEDURE — 80053 COMPREHEN METABOLIC PANEL: CPT | Performed by: INTERNAL MEDICINE

## 2021-06-24 PROCEDURE — 84443 ASSAY THYROID STIM HORMONE: CPT | Performed by: INTERNAL MEDICINE

## 2021-06-25 ENCOUNTER — OFFICE VISIT (OUTPATIENT)
Dept: FAMILY MEDICINE | Facility: CLINIC | Age: 41
End: 2021-06-25
Payer: COMMERCIAL

## 2021-06-25 ENCOUNTER — TELEPHONE (OUTPATIENT)
Dept: FAMILY MEDICINE | Facility: CLINIC | Age: 41
End: 2021-06-25

## 2021-06-25 ENCOUNTER — CLINICAL SUPPORT (OUTPATIENT)
Dept: FAMILY MEDICINE | Facility: CLINIC | Age: 41
End: 2021-06-25
Attending: INTERNAL MEDICINE
Payer: COMMERCIAL

## 2021-06-25 VITALS
SYSTOLIC BLOOD PRESSURE: 128 MMHG | OXYGEN SATURATION: 96 % | DIASTOLIC BLOOD PRESSURE: 80 MMHG | HEART RATE: 87 BPM | TEMPERATURE: 98 F | WEIGHT: 268.94 LBS | BODY MASS INDEX: 47.64 KG/M2

## 2021-06-25 DIAGNOSIS — D80.3 IGG DEFICIENCY: ICD-10-CM

## 2021-06-25 DIAGNOSIS — E11.59 HYPERTENSION ASSOCIATED WITH DIABETES: ICD-10-CM

## 2021-06-25 DIAGNOSIS — E11.9 TYPE 2 DIABETES MELLITUS WITHOUT COMPLICATION, WITHOUT LONG-TERM CURRENT USE OF INSULIN: ICD-10-CM

## 2021-06-25 DIAGNOSIS — E78.5 HYPERLIPIDEMIA DUE TO TYPE 2 DIABETES MELLITUS: ICD-10-CM

## 2021-06-25 DIAGNOSIS — E66.01 CLASS 3 SEVERE OBESITY DUE TO EXCESS CALORIES WITHOUT SERIOUS COMORBIDITY WITH BODY MASS INDEX (BMI) OF 45.0 TO 49.9 IN ADULT: ICD-10-CM

## 2021-06-25 DIAGNOSIS — I15.2 HYPERTENSION ASSOCIATED WITH DIABETES: ICD-10-CM

## 2021-06-25 DIAGNOSIS — E11.69 HYPERLIPIDEMIA DUE TO TYPE 2 DIABETES MELLITUS: ICD-10-CM

## 2021-06-25 DIAGNOSIS — F41.8 DEPRESSION WITH ANXIETY: ICD-10-CM

## 2021-06-25 DIAGNOSIS — E11.9 TYPE 2 DIABETES MELLITUS WITHOUT COMPLICATION, UNSPECIFIED WHETHER LONG TERM INSULIN USE: Primary | ICD-10-CM

## 2021-06-25 DIAGNOSIS — E11.9 TYPE 2 DIABETES MELLITUS WITHOUT COMPLICATION, UNSPECIFIED WHETHER LONG TERM INSULIN USE: ICD-10-CM

## 2021-06-25 PROCEDURE — 3074F SYST BP LT 130 MM HG: CPT | Mod: CPTII,S$GLB,, | Performed by: INTERNAL MEDICINE

## 2021-06-25 PROCEDURE — 1126F PR PAIN SEVERITY QUANTIFIED, NO PAIN PRESENT: ICD-10-PCS | Mod: S$GLB,,, | Performed by: INTERNAL MEDICINE

## 2021-06-25 PROCEDURE — 3008F PR BODY MASS INDEX (BMI) DOCUMENTED: ICD-10-PCS | Mod: CPTII,S$GLB,, | Performed by: INTERNAL MEDICINE

## 2021-06-25 PROCEDURE — 3044F HG A1C LEVEL LT 7.0%: CPT | Mod: CPTII,S$GLB,, | Performed by: INTERNAL MEDICINE

## 2021-06-25 PROCEDURE — 99214 OFFICE O/P EST MOD 30 MIN: CPT | Mod: S$GLB,,, | Performed by: INTERNAL MEDICINE

## 2021-06-25 PROCEDURE — 3074F PR MOST RECENT SYSTOLIC BLOOD PRESSURE < 130 MM HG: ICD-10-PCS | Mod: CPTII,S$GLB,, | Performed by: INTERNAL MEDICINE

## 2021-06-25 PROCEDURE — 3008F BODY MASS INDEX DOCD: CPT | Mod: CPTII,S$GLB,, | Performed by: INTERNAL MEDICINE

## 2021-06-25 PROCEDURE — 99999 PR PBB SHADOW E&M-EST. PATIENT-LVL III: ICD-10-PCS | Mod: PBBFAC,,, | Performed by: INTERNAL MEDICINE

## 2021-06-25 PROCEDURE — 3044F PR MOST RECENT HEMOGLOBIN A1C LEVEL <7.0%: ICD-10-PCS | Mod: CPTII,S$GLB,, | Performed by: INTERNAL MEDICINE

## 2021-06-25 PROCEDURE — 92228 DIABETIC EYE SCREENING PHOTO: ICD-10-PCS | Mod: TC,S$GLB,, | Performed by: INTERNAL MEDICINE

## 2021-06-25 PROCEDURE — 1126F AMNT PAIN NOTED NONE PRSNT: CPT | Mod: S$GLB,,, | Performed by: INTERNAL MEDICINE

## 2021-06-25 PROCEDURE — 92228 DIABETIC EYE SCREENING PHOTO: ICD-10-PCS | Mod: 26,S$GLB,, | Performed by: OPHTHALMOLOGY

## 2021-06-25 PROCEDURE — 99214 PR OFFICE/OUTPT VISIT, EST, LEVL IV, 30-39 MIN: ICD-10-PCS | Mod: S$GLB,,, | Performed by: INTERNAL MEDICINE

## 2021-06-25 PROCEDURE — 92228 IMG RTA DETC/MNTR DS PHY/QHP: CPT | Mod: 26,S$GLB,, | Performed by: OPHTHALMOLOGY

## 2021-06-25 PROCEDURE — 99999 PR PBB SHADOW E&M-EST. PATIENT-LVL III: CPT | Mod: PBBFAC,,, | Performed by: INTERNAL MEDICINE

## 2021-06-25 PROCEDURE — 3072F LOW RISK FOR RETINOPATHY: CPT | Mod: S$GLB,,, | Performed by: INTERNAL MEDICINE

## 2021-06-25 PROCEDURE — 3072F PR LOW RISK FOR RETINOPATHY: ICD-10-PCS | Mod: S$GLB,,, | Performed by: INTERNAL MEDICINE

## 2021-06-25 PROCEDURE — 3079F DIAST BP 80-89 MM HG: CPT | Mod: CPTII,S$GLB,, | Performed by: INTERNAL MEDICINE

## 2021-06-25 PROCEDURE — 92228 IMG RTA DETC/MNTR DS PHY/QHP: CPT | Mod: TC,S$GLB,, | Performed by: INTERNAL MEDICINE

## 2021-06-25 PROCEDURE — 3079F PR MOST RECENT DIASTOLIC BLOOD PRESSURE 80-89 MM HG: ICD-10-PCS | Mod: CPTII,S$GLB,, | Performed by: INTERNAL MEDICINE

## 2021-06-25 RX ORDER — BUPROPION HYDROCHLORIDE 150 MG/1
150 TABLET ORAL DAILY
Qty: 90 TABLET | Refills: 3 | Status: SHIPPED | OUTPATIENT
Start: 2021-06-25 | End: 2021-08-04

## 2021-06-25 RX ORDER — SEMAGLUTIDE 1.34 MG/ML
0.25 INJECTION, SOLUTION SUBCUTANEOUS
Qty: 1 PEN | Refills: 5 | Status: SHIPPED | OUTPATIENT
Start: 2021-06-25 | End: 2021-08-04

## 2021-06-25 RX ORDER — IMMUNE GLOBULIN SUBCUTANEOUS, HUMAN-KLHW 200 MG/ML
SOLUTION SUBCUTANEOUS
COMMUNITY
Start: 2021-05-21 | End: 2021-09-24

## 2021-07-16 ENCOUNTER — PATIENT MESSAGE (OUTPATIENT)
Dept: FAMILY MEDICINE | Facility: CLINIC | Age: 41
End: 2021-07-16

## 2021-08-03 ENCOUNTER — PATIENT MESSAGE (OUTPATIENT)
Dept: FAMILY MEDICINE | Facility: CLINIC | Age: 41
End: 2021-08-03

## 2021-08-03 DIAGNOSIS — F41.8 DEPRESSION WITH ANXIETY: ICD-10-CM

## 2021-08-04 ENCOUNTER — PATIENT MESSAGE (OUTPATIENT)
Dept: FAMILY MEDICINE | Facility: CLINIC | Age: 41
End: 2021-08-04

## 2021-08-04 RX ORDER — BUPROPION HYDROCHLORIDE 300 MG/1
300 TABLET ORAL DAILY
Qty: 90 TABLET | Refills: 3 | Status: SHIPPED | OUTPATIENT
Start: 2021-08-04 | End: 2022-03-25 | Stop reason: SDUPTHER

## 2021-08-04 RX ORDER — SEMAGLUTIDE 1.34 MG/ML
1 INJECTION, SOLUTION SUBCUTANEOUS
Qty: 6 PEN | Refills: 3 | Status: SHIPPED | OUTPATIENT
Start: 2021-08-04 | End: 2022-08-04

## 2021-08-06 ENCOUNTER — PATIENT MESSAGE (OUTPATIENT)
Dept: ADMINISTRATIVE | Facility: OTHER | Age: 41
End: 2021-08-06

## 2021-08-19 ENCOUNTER — PATIENT OUTREACH (OUTPATIENT)
Dept: ADMINISTRATIVE | Facility: OTHER | Age: 41
End: 2021-08-19

## 2021-08-20 ENCOUNTER — OFFICE VISIT (OUTPATIENT)
Dept: OPTOMETRY | Facility: CLINIC | Age: 41
End: 2021-08-20
Payer: COMMERCIAL

## 2021-08-20 DIAGNOSIS — Z01.00 EMMETROPIA: Primary | ICD-10-CM

## 2021-08-20 DIAGNOSIS — E11.9 TYPE 2 DIABETES MELLITUS WITHOUT COMPLICATION, WITHOUT LONG-TERM CURRENT USE OF INSULIN: ICD-10-CM

## 2021-08-20 DIAGNOSIS — E11.9 TYPE 2 DIABETES MELLITUS WITHOUT OPHTHALMIC MANIFESTATIONS: ICD-10-CM

## 2021-08-20 DIAGNOSIS — I10 ESSENTIAL HYPERTENSION: ICD-10-CM

## 2021-08-20 DIAGNOSIS — H04.123 DRY EYE SYNDROME, BILATERAL: ICD-10-CM

## 2021-08-20 DIAGNOSIS — H10.13 ALLERGIC CONJUNCTIVITIS OF BOTH EYES: ICD-10-CM

## 2021-08-20 PROCEDURE — 92014 PR EYE EXAM, EST PATIENT,COMPREHESV: ICD-10-PCS | Mod: S$GLB,,, | Performed by: OPTOMETRIST

## 2021-08-20 PROCEDURE — 92015 DETERMINE REFRACTIVE STATE: CPT | Mod: S$GLB,,, | Performed by: OPTOMETRIST

## 2021-08-20 PROCEDURE — 92014 COMPRE OPH EXAM EST PT 1/>: CPT | Mod: S$GLB,,, | Performed by: OPTOMETRIST

## 2021-08-20 PROCEDURE — 92015 PR REFRACTION: ICD-10-PCS | Mod: S$GLB,,, | Performed by: OPTOMETRIST

## 2021-08-20 PROCEDURE — 99999 PR PBB SHADOW E&M-EST. PATIENT-LVL III: ICD-10-PCS | Mod: PBBFAC,,, | Performed by: OPTOMETRIST

## 2021-08-20 PROCEDURE — 99999 PR PBB SHADOW E&M-EST. PATIENT-LVL III: CPT | Mod: PBBFAC,,, | Performed by: OPTOMETRIST

## 2021-08-20 RX ORDER — CYCLOSPORINE 0.5 MG/ML
1 EMULSION OPHTHALMIC 2 TIMES DAILY
Qty: 180 EACH | Refills: 3 | Status: SHIPPED | OUTPATIENT
Start: 2021-08-20 | End: 2021-09-24

## 2021-09-24 ENCOUNTER — OFFICE VISIT (OUTPATIENT)
Dept: FAMILY MEDICINE | Facility: CLINIC | Age: 41
End: 2021-09-24
Payer: COMMERCIAL

## 2021-09-24 ENCOUNTER — LAB VISIT (OUTPATIENT)
Dept: LAB | Facility: HOSPITAL | Age: 41
End: 2021-09-24
Attending: INTERNAL MEDICINE
Payer: COMMERCIAL

## 2021-09-24 VITALS
HEART RATE: 95 BPM | SYSTOLIC BLOOD PRESSURE: 128 MMHG | OXYGEN SATURATION: 96 % | TEMPERATURE: 98 F | DIASTOLIC BLOOD PRESSURE: 78 MMHG

## 2021-09-24 DIAGNOSIS — F41.8 DEPRESSION WITH ANXIETY: ICD-10-CM

## 2021-09-24 DIAGNOSIS — E11.69 HYPERLIPIDEMIA DUE TO TYPE 2 DIABETES MELLITUS: ICD-10-CM

## 2021-09-24 DIAGNOSIS — E11.59 HYPERTENSION ASSOCIATED WITH DIABETES: ICD-10-CM

## 2021-09-24 DIAGNOSIS — E78.5 HYPERLIPIDEMIA DUE TO TYPE 2 DIABETES MELLITUS: ICD-10-CM

## 2021-09-24 DIAGNOSIS — G44.209 TENSION HEADACHE: ICD-10-CM

## 2021-09-24 DIAGNOSIS — Z23 NEED FOR INFLUENZA VACCINATION: ICD-10-CM

## 2021-09-24 DIAGNOSIS — I15.2 HYPERTENSION ASSOCIATED WITH DIABETES: ICD-10-CM

## 2021-09-24 DIAGNOSIS — E03.9 ACQUIRED HYPOTHYROIDISM: ICD-10-CM

## 2021-09-24 DIAGNOSIS — E11.9 TYPE 2 DIABETES MELLITUS WITHOUT COMPLICATION, UNSPECIFIED WHETHER LONG TERM INSULIN USE: ICD-10-CM

## 2021-09-24 DIAGNOSIS — E11.9 TYPE 2 DIABETES MELLITUS WITHOUT COMPLICATION, WITHOUT LONG-TERM CURRENT USE OF INSULIN: ICD-10-CM

## 2021-09-24 DIAGNOSIS — F33.40 RECURRENT MAJOR DEPRESSIVE DISORDER, IN REMISSION: Primary | ICD-10-CM

## 2021-09-24 LAB
ESTIMATED AVG GLUCOSE: 111 MG/DL (ref 68–131)
HBA1C MFR BLD: 5.5 % (ref 4–5.6)

## 2021-09-24 PROCEDURE — 90686 FLU VACCINE (QUAD) GREATER THAN OR EQUAL TO 3YO PRESERVATIVE FREE IM: ICD-10-PCS | Mod: S$GLB,,, | Performed by: INTERNAL MEDICINE

## 2021-09-24 PROCEDURE — 4010F ACE/ARB THERAPY RXD/TAKEN: CPT | Mod: CPTII,S$GLB,, | Performed by: INTERNAL MEDICINE

## 2021-09-24 PROCEDURE — 36415 COLL VENOUS BLD VENIPUNCTURE: CPT | Mod: PO | Performed by: INTERNAL MEDICINE

## 2021-09-24 PROCEDURE — 90471 IMMUNIZATION ADMIN: CPT | Mod: S$GLB,,, | Performed by: INTERNAL MEDICINE

## 2021-09-24 PROCEDURE — 1159F PR MEDICATION LIST DOCUMENTED IN MEDICAL RECORD: ICD-10-PCS | Mod: CPTII,S$GLB,, | Performed by: INTERNAL MEDICINE

## 2021-09-24 PROCEDURE — 3072F LOW RISK FOR RETINOPATHY: CPT | Mod: CPTII,S$GLB,, | Performed by: INTERNAL MEDICINE

## 2021-09-24 PROCEDURE — 3072F PR LOW RISK FOR RETINOPATHY: ICD-10-PCS | Mod: CPTII,S$GLB,, | Performed by: INTERNAL MEDICINE

## 2021-09-24 PROCEDURE — 1159F MED LIST DOCD IN RCRD: CPT | Mod: CPTII,S$GLB,, | Performed by: INTERNAL MEDICINE

## 2021-09-24 PROCEDURE — 83036 HEMOGLOBIN GLYCOSYLATED A1C: CPT | Performed by: INTERNAL MEDICINE

## 2021-09-24 PROCEDURE — 99999 PR PBB SHADOW E&M-EST. PATIENT-LVL III: CPT | Mod: PBBFAC,,, | Performed by: INTERNAL MEDICINE

## 2021-09-24 PROCEDURE — 4010F PR ACE/ARB THEARPY RXD/TAKEN: ICD-10-PCS | Mod: CPTII,S$GLB,, | Performed by: INTERNAL MEDICINE

## 2021-09-24 PROCEDURE — 99999 PR PBB SHADOW E&M-EST. PATIENT-LVL III: ICD-10-PCS | Mod: PBBFAC,,, | Performed by: INTERNAL MEDICINE

## 2021-09-24 PROCEDURE — 3044F PR MOST RECENT HEMOGLOBIN A1C LEVEL <7.0%: ICD-10-PCS | Mod: CPTII,S$GLB,, | Performed by: INTERNAL MEDICINE

## 2021-09-24 PROCEDURE — 99214 PR OFFICE/OUTPT VISIT, EST, LEVL IV, 30-39 MIN: ICD-10-PCS | Mod: 25,S$GLB,, | Performed by: INTERNAL MEDICINE

## 2021-09-24 PROCEDURE — 3074F PR MOST RECENT SYSTOLIC BLOOD PRESSURE < 130 MM HG: ICD-10-PCS | Mod: CPTII,S$GLB,, | Performed by: INTERNAL MEDICINE

## 2021-09-24 PROCEDURE — 3078F DIAST BP <80 MM HG: CPT | Mod: CPTII,S$GLB,, | Performed by: INTERNAL MEDICINE

## 2021-09-24 PROCEDURE — 3074F SYST BP LT 130 MM HG: CPT | Mod: CPTII,S$GLB,, | Performed by: INTERNAL MEDICINE

## 2021-09-24 PROCEDURE — 90686 IIV4 VACC NO PRSV 0.5 ML IM: CPT | Mod: S$GLB,,, | Performed by: INTERNAL MEDICINE

## 2021-09-24 PROCEDURE — 99214 OFFICE O/P EST MOD 30 MIN: CPT | Mod: 25,S$GLB,, | Performed by: INTERNAL MEDICINE

## 2021-09-24 PROCEDURE — 3078F PR MOST RECENT DIASTOLIC BLOOD PRESSURE < 80 MM HG: ICD-10-PCS | Mod: CPTII,S$GLB,, | Performed by: INTERNAL MEDICINE

## 2021-09-24 PROCEDURE — 90471 FLU VACCINE (QUAD) GREATER THAN OR EQUAL TO 3YO PRESERVATIVE FREE IM: ICD-10-PCS | Mod: S$GLB,,, | Performed by: INTERNAL MEDICINE

## 2021-09-24 PROCEDURE — 3044F HG A1C LEVEL LT 7.0%: CPT | Mod: CPTII,S$GLB,, | Performed by: INTERNAL MEDICINE

## 2021-09-24 RX ORDER — VERAPAMIL HYDROCHLORIDE 120 MG/1
120 CAPSULE, EXTENDED RELEASE ORAL DAILY
Qty: 90 CAPSULE | Refills: 3 | Status: SHIPPED | OUTPATIENT
Start: 2021-09-24 | End: 2021-11-30

## 2021-09-27 ENCOUNTER — PATIENT MESSAGE (OUTPATIENT)
Dept: FAMILY MEDICINE | Facility: CLINIC | Age: 41
End: 2021-09-27

## 2021-09-28 ENCOUNTER — PATIENT MESSAGE (OUTPATIENT)
Dept: FAMILY MEDICINE | Facility: CLINIC | Age: 41
End: 2021-09-28

## 2021-10-21 ENCOUNTER — PATIENT MESSAGE (OUTPATIENT)
Dept: FAMILY MEDICINE | Facility: CLINIC | Age: 41
End: 2021-10-21

## 2021-10-21 RX ORDER — ERYTHROMYCIN 5 MG/G
OINTMENT OPHTHALMIC 3 TIMES DAILY
Qty: 3.5 G | Refills: 0 | Status: SHIPPED | OUTPATIENT
Start: 2021-10-21 | End: 2021-12-17

## 2021-10-22 ENCOUNTER — OFFICE VISIT (OUTPATIENT)
Dept: FAMILY MEDICINE | Facility: CLINIC | Age: 41
End: 2021-10-22
Payer: COMMERCIAL

## 2021-10-22 DIAGNOSIS — E11.9 TYPE 2 DIABETES MELLITUS WITHOUT COMPLICATION, WITHOUT LONG-TERM CURRENT USE OF INSULIN: Primary | ICD-10-CM

## 2021-10-22 DIAGNOSIS — I15.2 HYPERTENSION ASSOCIATED WITH DIABETES: ICD-10-CM

## 2021-10-22 DIAGNOSIS — E11.59 HYPERTENSION ASSOCIATED WITH DIABETES: ICD-10-CM

## 2021-10-22 DIAGNOSIS — E11.69 HYPERLIPIDEMIA DUE TO TYPE 2 DIABETES MELLITUS: ICD-10-CM

## 2021-10-22 DIAGNOSIS — K21.9 GASTROESOPHAGEAL REFLUX DISEASE WITHOUT ESOPHAGITIS: ICD-10-CM

## 2021-10-22 DIAGNOSIS — G44.229 CHRONIC TENSION-TYPE HEADACHE, NOT INTRACTABLE: ICD-10-CM

## 2021-10-22 DIAGNOSIS — F41.8 DEPRESSION WITH ANXIETY: ICD-10-CM

## 2021-10-22 DIAGNOSIS — E78.5 HYPERLIPIDEMIA DUE TO TYPE 2 DIABETES MELLITUS: ICD-10-CM

## 2021-10-22 PROCEDURE — 3072F LOW RISK FOR RETINOPATHY: CPT | Mod: CPTII,95,, | Performed by: INTERNAL MEDICINE

## 2021-10-22 PROCEDURE — 3072F PR LOW RISK FOR RETINOPATHY: ICD-10-PCS | Mod: CPTII,95,, | Performed by: INTERNAL MEDICINE

## 2021-10-22 PROCEDURE — 4010F PR ACE/ARB THEARPY RXD/TAKEN: ICD-10-PCS | Mod: CPTII,95,, | Performed by: INTERNAL MEDICINE

## 2021-10-22 PROCEDURE — 1159F PR MEDICATION LIST DOCUMENTED IN MEDICAL RECORD: ICD-10-PCS | Mod: CPTII,95,, | Performed by: INTERNAL MEDICINE

## 2021-10-22 PROCEDURE — 1160F RVW MEDS BY RX/DR IN RCRD: CPT | Mod: CPTII,95,, | Performed by: INTERNAL MEDICINE

## 2021-10-22 PROCEDURE — 1160F PR REVIEW ALL MEDS BY PRESCRIBER/CLIN PHARMACIST DOCUMENTED: ICD-10-PCS | Mod: CPTII,95,, | Performed by: INTERNAL MEDICINE

## 2021-10-22 PROCEDURE — 99214 PR OFFICE/OUTPT VISIT, EST, LEVL IV, 30-39 MIN: ICD-10-PCS | Mod: 95,,, | Performed by: INTERNAL MEDICINE

## 2021-10-22 PROCEDURE — 99214 OFFICE O/P EST MOD 30 MIN: CPT | Mod: 95,,, | Performed by: INTERNAL MEDICINE

## 2021-10-22 PROCEDURE — 1159F MED LIST DOCD IN RCRD: CPT | Mod: CPTII,95,, | Performed by: INTERNAL MEDICINE

## 2021-10-22 PROCEDURE — 4010F ACE/ARB THERAPY RXD/TAKEN: CPT | Mod: CPTII,95,, | Performed by: INTERNAL MEDICINE

## 2021-10-22 PROCEDURE — 3044F HG A1C LEVEL LT 7.0%: CPT | Mod: CPTII,95,, | Performed by: INTERNAL MEDICINE

## 2021-10-22 PROCEDURE — 3044F PR MOST RECENT HEMOGLOBIN A1C LEVEL <7.0%: ICD-10-PCS | Mod: CPTII,95,, | Performed by: INTERNAL MEDICINE

## 2021-10-27 ENCOUNTER — OFFICE VISIT (OUTPATIENT)
Dept: OPHTHALMOLOGY | Facility: CLINIC | Age: 41
End: 2021-10-27
Payer: COMMERCIAL

## 2021-10-27 DIAGNOSIS — H01.116 CONTACT ALLERGY EYELID, LEFT: Primary | ICD-10-CM

## 2021-10-27 PROCEDURE — 3044F HG A1C LEVEL LT 7.0%: CPT | Mod: CPTII,S$GLB,, | Performed by: OPHTHALMOLOGY

## 2021-10-27 PROCEDURE — 99203 PR OFFICE/OUTPT VISIT, NEW, LEVL III, 30-44 MIN: ICD-10-PCS | Mod: S$GLB,,, | Performed by: OPHTHALMOLOGY

## 2021-10-27 PROCEDURE — 1160F PR REVIEW ALL MEDS BY PRESCRIBER/CLIN PHARMACIST DOCUMENTED: ICD-10-PCS | Mod: CPTII,S$GLB,, | Performed by: OPHTHALMOLOGY

## 2021-10-27 PROCEDURE — 3044F PR MOST RECENT HEMOGLOBIN A1C LEVEL <7.0%: ICD-10-PCS | Mod: CPTII,S$GLB,, | Performed by: OPHTHALMOLOGY

## 2021-10-27 PROCEDURE — 4010F ACE/ARB THERAPY RXD/TAKEN: CPT | Mod: CPTII,S$GLB,, | Performed by: OPHTHALMOLOGY

## 2021-10-27 PROCEDURE — 1159F MED LIST DOCD IN RCRD: CPT | Mod: CPTII,S$GLB,, | Performed by: OPHTHALMOLOGY

## 2021-10-27 PROCEDURE — 99203 OFFICE O/P NEW LOW 30 MIN: CPT | Mod: S$GLB,,, | Performed by: OPHTHALMOLOGY

## 2021-10-27 PROCEDURE — 99999 PR PBB SHADOW E&M-EST. PATIENT-LVL III: CPT | Mod: PBBFAC,,, | Performed by: OPHTHALMOLOGY

## 2021-10-27 PROCEDURE — 1160F RVW MEDS BY RX/DR IN RCRD: CPT | Mod: CPTII,S$GLB,, | Performed by: OPHTHALMOLOGY

## 2021-10-27 PROCEDURE — 1159F PR MEDICATION LIST DOCUMENTED IN MEDICAL RECORD: ICD-10-PCS | Mod: CPTII,S$GLB,, | Performed by: OPHTHALMOLOGY

## 2021-10-27 PROCEDURE — 99999 PR PBB SHADOW E&M-EST. PATIENT-LVL III: ICD-10-PCS | Mod: PBBFAC,,, | Performed by: OPHTHALMOLOGY

## 2021-10-27 PROCEDURE — 4010F PR ACE/ARB THEARPY RXD/TAKEN: ICD-10-PCS | Mod: CPTII,S$GLB,, | Performed by: OPHTHALMOLOGY

## 2021-10-27 RX ORDER — FLUOROMETHOLONE 1 MG/ML
1 SUSPENSION/ DROPS OPHTHALMIC 3 TIMES DAILY
Qty: 5 ML | Refills: 1 | Status: SHIPPED | OUTPATIENT
Start: 2021-10-27 | End: 2021-12-17

## 2021-10-28 ENCOUNTER — PATIENT MESSAGE (OUTPATIENT)
Dept: OTHER | Facility: OTHER | Age: 41
End: 2021-10-28
Payer: COMMERCIAL

## 2021-11-02 ENCOUNTER — OFFICE VISIT (OUTPATIENT)
Dept: OPHTHALMOLOGY | Facility: CLINIC | Age: 41
End: 2021-11-02
Payer: COMMERCIAL

## 2021-11-02 DIAGNOSIS — H01.116 CONTACT ALLERGY EYELID, LEFT: Primary | ICD-10-CM

## 2021-11-02 PROCEDURE — 99999 PR PBB SHADOW E&M-EST. PATIENT-LVL III: CPT | Mod: PBBFAC,,, | Performed by: OPHTHALMOLOGY

## 2021-11-02 PROCEDURE — 1160F PR REVIEW ALL MEDS BY PRESCRIBER/CLIN PHARMACIST DOCUMENTED: ICD-10-PCS | Mod: CPTII,S$GLB,, | Performed by: OPHTHALMOLOGY

## 2021-11-02 PROCEDURE — 3044F HG A1C LEVEL LT 7.0%: CPT | Mod: CPTII,S$GLB,, | Performed by: OPHTHALMOLOGY

## 2021-11-02 PROCEDURE — 99999 PR PBB SHADOW E&M-EST. PATIENT-LVL III: ICD-10-PCS | Mod: PBBFAC,,, | Performed by: OPHTHALMOLOGY

## 2021-11-02 PROCEDURE — 1160F RVW MEDS BY RX/DR IN RCRD: CPT | Mod: CPTII,S$GLB,, | Performed by: OPHTHALMOLOGY

## 2021-11-02 PROCEDURE — 1159F MED LIST DOCD IN RCRD: CPT | Mod: CPTII,S$GLB,, | Performed by: OPHTHALMOLOGY

## 2021-11-02 PROCEDURE — 4010F PR ACE/ARB THEARPY RXD/TAKEN: ICD-10-PCS | Mod: CPTII,S$GLB,, | Performed by: OPHTHALMOLOGY

## 2021-11-02 PROCEDURE — 4010F ACE/ARB THERAPY RXD/TAKEN: CPT | Mod: CPTII,S$GLB,, | Performed by: OPHTHALMOLOGY

## 2021-11-02 PROCEDURE — 99212 OFFICE O/P EST SF 10 MIN: CPT | Mod: S$GLB,,, | Performed by: OPHTHALMOLOGY

## 2021-11-02 PROCEDURE — 99212 PR OFFICE/OUTPT VISIT, EST, LEVL II, 10-19 MIN: ICD-10-PCS | Mod: S$GLB,,, | Performed by: OPHTHALMOLOGY

## 2021-11-02 PROCEDURE — 1159F PR MEDICATION LIST DOCUMENTED IN MEDICAL RECORD: ICD-10-PCS | Mod: CPTII,S$GLB,, | Performed by: OPHTHALMOLOGY

## 2021-11-02 PROCEDURE — 3044F PR MOST RECENT HEMOGLOBIN A1C LEVEL <7.0%: ICD-10-PCS | Mod: CPTII,S$GLB,, | Performed by: OPHTHALMOLOGY

## 2021-12-05 ENCOUNTER — PATIENT MESSAGE (OUTPATIENT)
Dept: OTHER | Facility: OTHER | Age: 41
End: 2021-12-05
Payer: COMMERCIAL

## 2021-12-06 ENCOUNTER — PATIENT MESSAGE (OUTPATIENT)
Dept: INTERNAL MEDICINE | Facility: CLINIC | Age: 41
End: 2021-12-06
Payer: COMMERCIAL

## 2021-12-07 ENCOUNTER — PATIENT MESSAGE (OUTPATIENT)
Dept: INTERNAL MEDICINE | Facility: CLINIC | Age: 41
End: 2021-12-07
Payer: COMMERCIAL

## 2021-12-10 ENCOUNTER — LAB VISIT (OUTPATIENT)
Dept: LAB | Facility: HOSPITAL | Age: 41
End: 2021-12-10
Attending: INTERNAL MEDICINE
Payer: COMMERCIAL

## 2021-12-10 DIAGNOSIS — E11.9 TYPE 2 DIABETES MELLITUS WITHOUT COMPLICATION, WITHOUT LONG-TERM CURRENT USE OF INSULIN: ICD-10-CM

## 2021-12-10 LAB
ALBUMIN SERPL BCP-MCNC: 3.6 G/DL (ref 3.5–5.2)
ALP SERPL-CCNC: 77 U/L (ref 55–135)
ALT SERPL W/O P-5'-P-CCNC: 16 U/L (ref 10–44)
ANION GAP SERPL CALC-SCNC: 11 MMOL/L (ref 8–16)
AST SERPL-CCNC: 11 U/L (ref 10–40)
BASOPHILS # BLD AUTO: 0.03 K/UL (ref 0–0.2)
BASOPHILS NFR BLD: 0.3 % (ref 0–1.9)
BILIRUB SERPL-MCNC: 0.4 MG/DL (ref 0.1–1)
BUN SERPL-MCNC: 11 MG/DL (ref 6–20)
CALCIUM SERPL-MCNC: 9.3 MG/DL (ref 8.7–10.5)
CHLORIDE SERPL-SCNC: 102 MMOL/L (ref 95–110)
CHOLEST SERPL-MCNC: 227 MG/DL (ref 120–199)
CHOLEST/HDLC SERPL: 4.8 {RATIO} (ref 2–5)
CO2 SERPL-SCNC: 26 MMOL/L (ref 23–29)
CREAT SERPL-MCNC: 0.6 MG/DL (ref 0.5–1.4)
DIFFERENTIAL METHOD: ABNORMAL
EOSINOPHIL # BLD AUTO: 0.1 K/UL (ref 0–0.5)
EOSINOPHIL NFR BLD: 1.2 % (ref 0–8)
ERYTHROCYTE [DISTWIDTH] IN BLOOD BY AUTOMATED COUNT: 14.3 % (ref 11.5–14.5)
EST. GFR  (AFRICAN AMERICAN): >60 ML/MIN/1.73 M^2
EST. GFR  (NON AFRICAN AMERICAN): >60 ML/MIN/1.73 M^2
ESTIMATED AVG GLUCOSE: 117 MG/DL (ref 68–131)
GLUCOSE SERPL-MCNC: 102 MG/DL (ref 70–110)
HBA1C MFR BLD: 5.7 % (ref 4–5.6)
HCT VFR BLD AUTO: 39.9 % (ref 37–48.5)
HDLC SERPL-MCNC: 47 MG/DL (ref 40–75)
HDLC SERPL: 20.7 % (ref 20–50)
HGB BLD-MCNC: 12.7 G/DL (ref 12–16)
IMM GRANULOCYTES # BLD AUTO: 0.03 K/UL (ref 0–0.04)
IMM GRANULOCYTES NFR BLD AUTO: 0.3 % (ref 0–0.5)
LDLC SERPL CALC-MCNC: 151.4 MG/DL (ref 63–159)
LYMPHOCYTES # BLD AUTO: 1.8 K/UL (ref 1–4.8)
LYMPHOCYTES NFR BLD: 18 % (ref 18–48)
MCH RBC QN AUTO: 27.1 PG (ref 27–31)
MCHC RBC AUTO-ENTMCNC: 31.8 G/DL (ref 32–36)
MCV RBC AUTO: 85 FL (ref 82–98)
MONOCYTES # BLD AUTO: 0.5 K/UL (ref 0.3–1)
MONOCYTES NFR BLD: 5.2 % (ref 4–15)
NEUTROPHILS # BLD AUTO: 7.5 K/UL (ref 1.8–7.7)
NEUTROPHILS NFR BLD: 75 % (ref 38–73)
NONHDLC SERPL-MCNC: 180 MG/DL
NRBC BLD-RTO: 0 /100 WBC
PLATELET # BLD AUTO: 324 K/UL (ref 150–450)
PMV BLD AUTO: 11.4 FL (ref 9.2–12.9)
POTASSIUM SERPL-SCNC: 4 MMOL/L (ref 3.5–5.1)
PROT SERPL-MCNC: 7.3 G/DL (ref 6–8.4)
RBC # BLD AUTO: 4.69 M/UL (ref 4–5.4)
SODIUM SERPL-SCNC: 139 MMOL/L (ref 136–145)
TRIGL SERPL-MCNC: 143 MG/DL (ref 30–150)
WBC # BLD AUTO: 9.96 K/UL (ref 3.9–12.7)

## 2021-12-10 PROCEDURE — 85025 COMPLETE CBC W/AUTO DIFF WBC: CPT | Performed by: INTERNAL MEDICINE

## 2021-12-10 PROCEDURE — 80061 LIPID PANEL: CPT | Performed by: INTERNAL MEDICINE

## 2021-12-10 PROCEDURE — 80053 COMPREHEN METABOLIC PANEL: CPT | Performed by: INTERNAL MEDICINE

## 2021-12-10 PROCEDURE — 83036 HEMOGLOBIN GLYCOSYLATED A1C: CPT | Performed by: INTERNAL MEDICINE

## 2021-12-17 ENCOUNTER — OFFICE VISIT (OUTPATIENT)
Dept: INTERNAL MEDICINE | Facility: CLINIC | Age: 41
End: 2021-12-17
Payer: COMMERCIAL

## 2021-12-17 VITALS
HEART RATE: 93 BPM | OXYGEN SATURATION: 95 % | SYSTOLIC BLOOD PRESSURE: 140 MMHG | BODY MASS INDEX: 49.06 KG/M2 | TEMPERATURE: 99 F | WEIGHT: 276.88 LBS | DIASTOLIC BLOOD PRESSURE: 78 MMHG | HEIGHT: 63 IN

## 2021-12-17 DIAGNOSIS — E11.69 HYPERLIPIDEMIA DUE TO TYPE 2 DIABETES MELLITUS: ICD-10-CM

## 2021-12-17 DIAGNOSIS — E66.01 CLASS 3 SEVERE OBESITY DUE TO EXCESS CALORIES WITHOUT SERIOUS COMORBIDITY WITH BODY MASS INDEX (BMI) OF 45.0 TO 49.9 IN ADULT: ICD-10-CM

## 2021-12-17 DIAGNOSIS — E11.9 TYPE 2 DIABETES MELLITUS WITHOUT COMPLICATION, WITHOUT LONG-TERM CURRENT USE OF INSULIN: ICD-10-CM

## 2021-12-17 DIAGNOSIS — I15.2 HYPERTENSION ASSOCIATED WITH DIABETES: ICD-10-CM

## 2021-12-17 DIAGNOSIS — F41.8 DEPRESSION WITH ANXIETY: ICD-10-CM

## 2021-12-17 DIAGNOSIS — E11.59 HYPERTENSION ASSOCIATED WITH DIABETES: ICD-10-CM

## 2021-12-17 DIAGNOSIS — K21.9 GASTROESOPHAGEAL REFLUX DISEASE WITHOUT ESOPHAGITIS: ICD-10-CM

## 2021-12-17 DIAGNOSIS — J45.20 MILD INTERMITTENT ASTHMA WITHOUT COMPLICATION: ICD-10-CM

## 2021-12-17 DIAGNOSIS — E78.5 HYPERLIPIDEMIA DUE TO TYPE 2 DIABETES MELLITUS: ICD-10-CM

## 2021-12-17 DIAGNOSIS — E03.9 ACQUIRED HYPOTHYROIDISM: ICD-10-CM

## 2021-12-17 DIAGNOSIS — D80.3 IGG DEFICIENCY: ICD-10-CM

## 2021-12-17 DIAGNOSIS — K59.09 OTHER CONSTIPATION: ICD-10-CM

## 2021-12-17 DIAGNOSIS — E78.49 OTHER HYPERLIPIDEMIA: ICD-10-CM

## 2021-12-17 PROBLEM — R60.9 FLUID RETENTION: Status: RESOLVED | Noted: 2019-09-30 | Resolved: 2021-12-17

## 2021-12-17 PROBLEM — F33.40 RECURRENT MAJOR DEPRESSIVE DISORDER, IN REMISSION: Status: RESOLVED | Noted: 2021-09-24 | Resolved: 2021-12-17

## 2021-12-17 PROCEDURE — 3066F PR DOCUMENTATION OF TREATMENT FOR NEPHROPATHY: ICD-10-PCS | Mod: CPTII,S$GLB,, | Performed by: INTERNAL MEDICINE

## 2021-12-17 PROCEDURE — 99999 PR PBB SHADOW E&M-EST. PATIENT-LVL III: CPT | Mod: PBBFAC,,, | Performed by: INTERNAL MEDICINE

## 2021-12-17 PROCEDURE — 3061F NEG MICROALBUMINURIA REV: CPT | Mod: CPTII,S$GLB,, | Performed by: INTERNAL MEDICINE

## 2021-12-17 PROCEDURE — 99999 PR PBB SHADOW E&M-EST. PATIENT-LVL III: ICD-10-PCS | Mod: PBBFAC,,, | Performed by: INTERNAL MEDICINE

## 2021-12-17 PROCEDURE — 3066F NEPHROPATHY DOC TX: CPT | Mod: CPTII,S$GLB,, | Performed by: INTERNAL MEDICINE

## 2021-12-17 PROCEDURE — 4010F PR ACE/ARB THEARPY RXD/TAKEN: ICD-10-PCS | Mod: CPTII,S$GLB,, | Performed by: INTERNAL MEDICINE

## 2021-12-17 PROCEDURE — 3072F PR LOW RISK FOR RETINOPATHY: ICD-10-PCS | Mod: CPTII,S$GLB,, | Performed by: INTERNAL MEDICINE

## 2021-12-17 PROCEDURE — 3061F PR NEG MICROALBUMINURIA RESULT DOCUMENTED/REVIEW: ICD-10-PCS | Mod: CPTII,S$GLB,, | Performed by: INTERNAL MEDICINE

## 2021-12-17 PROCEDURE — 4010F ACE/ARB THERAPY RXD/TAKEN: CPT | Mod: CPTII,S$GLB,, | Performed by: INTERNAL MEDICINE

## 2021-12-17 PROCEDURE — 99214 PR OFFICE/OUTPT VISIT, EST, LEVL IV, 30-39 MIN: ICD-10-PCS | Mod: S$GLB,,, | Performed by: INTERNAL MEDICINE

## 2021-12-17 PROCEDURE — 3072F LOW RISK FOR RETINOPATHY: CPT | Mod: CPTII,S$GLB,, | Performed by: INTERNAL MEDICINE

## 2021-12-17 PROCEDURE — 99214 OFFICE O/P EST MOD 30 MIN: CPT | Mod: S$GLB,,, | Performed by: INTERNAL MEDICINE

## 2021-12-17 RX ORDER — HUMAN IMMUNOGLOBULIN G 0.2 G/ML
25 LIQUID SUBCUTANEOUS
COMMUNITY
Start: 2021-11-18

## 2021-12-17 RX ORDER — ROSUVASTATIN CALCIUM 20 MG/1
20 TABLET, COATED ORAL DAILY
Qty: 90 TABLET | Refills: 3 | Status: SHIPPED | OUTPATIENT
Start: 2021-12-17 | End: 2022-12-09 | Stop reason: SDUPTHER

## 2021-12-17 RX ORDER — PREDNISONE 20 MG/1
40 TABLET ORAL DAILY
COMMUNITY
Start: 2021-11-08 | End: 2022-03-25

## 2022-01-07 ENCOUNTER — PATIENT MESSAGE (OUTPATIENT)
Dept: INTERNAL MEDICINE | Facility: CLINIC | Age: 42
End: 2022-01-07
Payer: COMMERCIAL

## 2022-01-17 ENCOUNTER — PATIENT MESSAGE (OUTPATIENT)
Dept: INTERNAL MEDICINE | Facility: CLINIC | Age: 42
End: 2022-01-17
Payer: COMMERCIAL

## 2022-01-17 RX ORDER — ALBUTEROL SULFATE 90 UG/1
2 AEROSOL, METERED RESPIRATORY (INHALATION) EVERY 6 HOURS PRN
Qty: 18 G | Refills: 0 | Status: SHIPPED | OUTPATIENT
Start: 2022-01-17 | End: 2022-09-29

## 2022-01-20 ENCOUNTER — OFFICE VISIT (OUTPATIENT)
Dept: OPTOMETRY | Facility: CLINIC | Age: 42
End: 2022-01-20
Payer: COMMERCIAL

## 2022-01-20 DIAGNOSIS — H04.123 DRY EYE SYNDROME, BILATERAL: ICD-10-CM

## 2022-01-20 DIAGNOSIS — H10.13 ALLERGIC CONJUNCTIVITIS OF BOTH EYES: Primary | ICD-10-CM

## 2022-01-20 DIAGNOSIS — H52.203 ASTIGMATISM OF BOTH EYES, UNSPECIFIED TYPE: ICD-10-CM

## 2022-01-20 PROCEDURE — 92012 INTRM OPH EXAM EST PATIENT: CPT | Mod: S$GLB,,, | Performed by: OPTOMETRIST

## 2022-01-20 PROCEDURE — 92015 PR REFRACTION: ICD-10-PCS | Mod: S$GLB,,, | Performed by: OPTOMETRIST

## 2022-01-20 PROCEDURE — 1159F PR MEDICATION LIST DOCUMENTED IN MEDICAL RECORD: ICD-10-PCS | Mod: CPTII,S$GLB,, | Performed by: OPTOMETRIST

## 2022-01-20 PROCEDURE — 99999 PR PBB SHADOW E&M-EST. PATIENT-LVL III: ICD-10-PCS | Mod: PBBFAC,,, | Performed by: OPTOMETRIST

## 2022-01-20 PROCEDURE — 92015 DETERMINE REFRACTIVE STATE: CPT | Mod: S$GLB,,, | Performed by: OPTOMETRIST

## 2022-01-20 PROCEDURE — 92012 PR EYE EXAM, EST PATIENT,INTERMED: ICD-10-PCS | Mod: S$GLB,,, | Performed by: OPTOMETRIST

## 2022-01-20 PROCEDURE — 99999 PR PBB SHADOW E&M-EST. PATIENT-LVL III: CPT | Mod: PBBFAC,,, | Performed by: OPTOMETRIST

## 2022-01-20 PROCEDURE — 1159F MED LIST DOCD IN RCRD: CPT | Mod: CPTII,S$GLB,, | Performed by: OPTOMETRIST

## 2022-01-20 RX ORDER — LOTEPREDNOL ETABONATE 2.5 MG/ML
1 SUSPENSION/ DROPS OPHTHALMIC 4 TIMES DAILY
Qty: 8.3 ML | Refills: 4 | Status: SHIPPED | OUTPATIENT
Start: 2022-01-20 | End: 2022-01-27

## 2022-01-20 NOTE — PROGRESS NOTES
HPI     PRESLEY 5/2020   Pt has trouble seeing at night and when the ichyness in her eyes start she   feels her vision is worse. States he feels a glare over everything   Pt was using Xiidra for itchy ness PRN switched  due to a horrible after   taste when using the drops.   Pt states restasis did not help   No flashes/floaters No other visual complaints    Last edited by Ewa Arthur on 1/20/2022 10:07 AM. (History)            Assessment /Plan     For exam results, see Encounter Report.    Allergic conjunctivitis of both eyes  Dry eye syndrome, bilateral   Pt was on xiidra, stopped 2/2 taste   Pt does not feel restasis is working/ giving as good of results as Xiidra  Discontinue Restasis  Restart    lifitegrast (XIIDRA) 5 % Dpet; Apply 1 drop to eye every 12 (twelve) hours.  Dispense: 180 each; Refill: 4  -   Start   loteprednol etabonate (EYSUVIS) 0.25 % DrpS; Apply 1 drop to eye 4 (four) times daily. for 7 days  Then BID x 1 week   Pataday Extra strength QAM   Systane HP TID OU    Astigmatism   Rx specs, SV for computer       RTC AUG 2022 for annual/ DFE

## 2022-02-18 ENCOUNTER — TELEPHONE (OUTPATIENT)
Dept: INTERNAL MEDICINE | Facility: CLINIC | Age: 42
End: 2022-02-18
Payer: COMMERCIAL

## 2022-02-18 NOTE — TELEPHONE ENCOUNTER
BP reading of 130/99 was taken from pt's [facility] flow sheet on 2/16/2022.  Pt is being followed by [facility].

## 2022-02-22 ENCOUNTER — PATIENT OUTREACH (OUTPATIENT)
Dept: ADMINISTRATIVE | Facility: HOSPITAL | Age: 42
End: 2022-02-22
Payer: COMMERCIAL

## 2022-02-22 NOTE — PROGRESS NOTES
Health Maintenance Due   Topic Date Due    Mammogram  04/08/2022     Triggered LINKS & reconciled immunizations.  Updated Care Everywhere.  Checked pt's [facility] flow sheet for an updated BP reading.  Chart review completed.

## 2022-02-28 PROCEDURE — 99457 PR MONITORING, PHYSIOL PARAM, REMOTE, 1ST 20 MINS, PER MONTH: ICD-10-PCS | Mod: S$GLB,,, | Performed by: INTERNAL MEDICINE

## 2022-02-28 PROCEDURE — 99457 RPM TX MGMT 1ST 20 MIN: CPT | Mod: S$GLB,,, | Performed by: INTERNAL MEDICINE

## 2022-03-04 ENCOUNTER — PATIENT OUTREACH (OUTPATIENT)
Dept: ADMINISTRATIVE | Facility: HOSPITAL | Age: 42
End: 2022-03-04
Payer: COMMERCIAL

## 2022-03-04 NOTE — PROGRESS NOTES
Health Maintenance Due   Topic Date Due    Mammogram  04/08/2022     Triggered LINKS.  Updated Care Everywhere.  Checked for an updated BP reading in pt's [facility] flow sheet.  Chart review completed.

## 2022-03-06 DIAGNOSIS — K21.9 GASTROESOPHAGEAL REFLUX DISEASE WITHOUT ESOPHAGITIS: ICD-10-CM

## 2022-03-07 NOTE — TELEPHONE ENCOUNTER
No new care gaps identified.  Powered by [email or web address]. Reference number: [id number].   3/06/2022 7:44:24 PM CST

## 2022-03-08 ENCOUNTER — PATIENT MESSAGE (OUTPATIENT)
Dept: ADMINISTRATIVE | Facility: OTHER | Age: 42
End: 2022-03-08
Payer: COMMERCIAL

## 2022-03-08 RX ORDER — PANTOPRAZOLE SODIUM 40 MG/1
TABLET, DELAYED RELEASE ORAL
Qty: 90 TABLET | Refills: 3 | Status: SHIPPED | OUTPATIENT
Start: 2022-03-08 | End: 2022-12-09 | Stop reason: SDUPTHER

## 2022-03-08 NOTE — TELEPHONE ENCOUNTER
Refill Authorization Note   Sanjana Queen  is requesting a refill authorization.  Brief Assessment and Rationale for Refill:  Approve     Medication Therapy Plan:       Medication Reconciliation Completed: No   Comments:   --->Care Gap information included below if applicable.   Orders Placed This Encounter    pantoprazole (PROTONIX) 40 MG tablet      Requested Prescriptions   Signed Prescriptions Disp Refills    pantoprazole (PROTONIX) 40 MG tablet 90 tablet 3     Sig: TAKE 1 TABLET DAILY       Gastroenterology: Proton Pump Inhibitors 2 Passed - 3/6/2022  7:43 PM        Passed - Patient is at least 18 years old        Passed - Osteoporosis is not on problem list        Passed - Negative Pregnancy Status Check        Passed - An appropriate indication is on the problem list        Passed - Valid encounter within last 15 months     Recent Visits  Date Type Provider Dept   12/17/21 Office Visit Gisselle Tavera MD [facility]   10/22/21 Office Visit Gisselle Tavera MD [facility]   09/24/21 Office Visit Gisselle Tavera MD [facility]   06/25/21 Office Visit Gisselle Tavera MD [facility]   12/18/20 Office Visit Gisselle Tavera MD [facility]   09/10/20 Office Visit Gisselle Tavera MD [facility]   03/23/20 Office Visit Gisselle Tavera MD [facility]   Showing recent visits within past 720 days and meeting all other requirements  Future Appointments  No visits were found meeting these conditions.  Showing future appointments within next 150 days and meeting all other requirements      Future Appointments              [facility] 1 week [facility]    [facility]                    Appointments  past 12m or future 3m with PCP    Date Provider   Last Visit   12/17/2021 Gisselle Tavera MD   Next Visit   3/25/2022 Gisselle Tavera MD    ED visits in past 90 days: 0     Note composed:11:00 AM 03/08/2022

## 2022-03-10 ENCOUNTER — PATIENT MESSAGE (OUTPATIENT)
Dept: INTERNAL MEDICINE | Facility: CLINIC | Age: 42
End: 2022-03-10
Payer: COMMERCIAL

## 2022-03-10 DIAGNOSIS — R23.2 HOT FLASHES: Primary | ICD-10-CM

## 2022-03-10 DIAGNOSIS — N91.2 AMENORRHEA: ICD-10-CM

## 2022-03-11 ENCOUNTER — PATIENT OUTREACH (OUTPATIENT)
Dept: ADMINISTRATIVE | Facility: HOSPITAL | Age: 42
End: 2022-03-11
Payer: COMMERCIAL

## 2022-03-11 NOTE — PROGRESS NOTES
Health Maintenance Due   Topic Date Due    Mammogram  04/08/2022     Triggered LINKS.  Updated Care Everywhere.  Checked pt's Hypertension Digital Medicine flow sheet for an updated BP reading.  Chart review completed.

## 2022-03-18 ENCOUNTER — LAB VISIT (OUTPATIENT)
Dept: LAB | Facility: HOSPITAL | Age: 42
End: 2022-03-18
Attending: INTERNAL MEDICINE
Payer: COMMERCIAL

## 2022-03-18 DIAGNOSIS — N91.2 AMENORRHEA: ICD-10-CM

## 2022-03-18 DIAGNOSIS — E11.9 TYPE 2 DIABETES MELLITUS WITHOUT COMPLICATION, WITHOUT LONG-TERM CURRENT USE OF INSULIN: ICD-10-CM

## 2022-03-18 DIAGNOSIS — R23.2 HOT FLASHES: ICD-10-CM

## 2022-03-18 LAB
CHOLEST SERPL-MCNC: 157 MG/DL (ref 120–199)
CHOLEST/HDLC SERPL: 3.9 {RATIO} (ref 2–5)
ESTIMATED AVG GLUCOSE: 108 MG/DL (ref 68–131)
FSH SERPL-ACNC: 11.3 MIU/ML
HBA1C MFR BLD: 5.4 % (ref 4–5.6)
HDLC SERPL-MCNC: 40 MG/DL (ref 40–75)
HDLC SERPL: 25.5 % (ref 20–50)
LDLC SERPL CALC-MCNC: 91 MG/DL (ref 63–159)
LH SERPL-ACNC: 3.4 MIU/ML
NONHDLC SERPL-MCNC: 117 MG/DL
PROGEST SERPL-MCNC: 0.1 NG/ML
PROLACTIN SERPL IA-MCNC: 17 NG/ML (ref 5.2–26.5)
TRIGL SERPL-MCNC: 130 MG/DL (ref 30–150)

## 2022-03-18 PROCEDURE — 83002 ASSAY OF GONADOTROPIN (LH): CPT | Performed by: INTERNAL MEDICINE

## 2022-03-18 PROCEDURE — 84146 ASSAY OF PROLACTIN: CPT | Performed by: INTERNAL MEDICINE

## 2022-03-18 PROCEDURE — 36415 COLL VENOUS BLD VENIPUNCTURE: CPT | Performed by: INTERNAL MEDICINE

## 2022-03-18 PROCEDURE — 80061 LIPID PANEL: CPT | Performed by: INTERNAL MEDICINE

## 2022-03-18 PROCEDURE — 82672 ASSAY OF ESTROGEN: CPT | Performed by: INTERNAL MEDICINE

## 2022-03-18 PROCEDURE — 83001 ASSAY OF GONADOTROPIN (FSH): CPT | Performed by: INTERNAL MEDICINE

## 2022-03-18 PROCEDURE — 83036 HEMOGLOBIN GLYCOSYLATED A1C: CPT | Performed by: INTERNAL MEDICINE

## 2022-03-18 PROCEDURE — 84144 ASSAY OF PROGESTERONE: CPT | Performed by: INTERNAL MEDICINE

## 2022-03-21 LAB — ESTROGEN SERPL-MCNC: 113 PG/ML

## 2022-03-22 ENCOUNTER — PATIENT MESSAGE (OUTPATIENT)
Dept: INTERNAL MEDICINE | Facility: CLINIC | Age: 42
End: 2022-03-22
Payer: COMMERCIAL

## 2022-03-25 ENCOUNTER — OFFICE VISIT (OUTPATIENT)
Dept: INTERNAL MEDICINE | Facility: CLINIC | Age: 42
End: 2022-03-25
Payer: COMMERCIAL

## 2022-03-25 DIAGNOSIS — E11.9 TYPE 2 DIABETES MELLITUS WITHOUT COMPLICATION, WITHOUT LONG-TERM CURRENT USE OF INSULIN: ICD-10-CM

## 2022-03-25 DIAGNOSIS — N92.6 IRREGULAR PERIODS: Primary | ICD-10-CM

## 2022-03-25 DIAGNOSIS — J45.21 MILD INTERMITTENT ASTHMA WITH ACUTE EXACERBATION: ICD-10-CM

## 2022-03-25 DIAGNOSIS — I15.2 HYPERTENSION ASSOCIATED WITH DIABETES: ICD-10-CM

## 2022-03-25 DIAGNOSIS — Z12.31 ENCOUNTER FOR SCREENING MAMMOGRAM FOR MALIGNANT NEOPLASM OF BREAST: ICD-10-CM

## 2022-03-25 DIAGNOSIS — E11.59 HYPERTENSION ASSOCIATED WITH DIABETES: ICD-10-CM

## 2022-03-25 DIAGNOSIS — E03.9 ACQUIRED HYPOTHYROIDISM: ICD-10-CM

## 2022-03-25 DIAGNOSIS — E66.01 CLASS 3 SEVERE OBESITY DUE TO EXCESS CALORIES WITHOUT SERIOUS COMORBIDITY WITH BODY MASS INDEX (BMI) OF 45.0 TO 49.9 IN ADULT: ICD-10-CM

## 2022-03-25 DIAGNOSIS — E11.69 HYPERLIPIDEMIA DUE TO TYPE 2 DIABETES MELLITUS: ICD-10-CM

## 2022-03-25 DIAGNOSIS — F41.8 DEPRESSION WITH ANXIETY: ICD-10-CM

## 2022-03-25 DIAGNOSIS — E78.5 HYPERLIPIDEMIA DUE TO TYPE 2 DIABETES MELLITUS: ICD-10-CM

## 2022-03-25 DIAGNOSIS — J45.20 MILD INTERMITTENT ASTHMA WITHOUT COMPLICATION: ICD-10-CM

## 2022-03-25 PROCEDURE — 99999 PR PBB SHADOW E&M-EST. PATIENT-LVL V: ICD-10-PCS | Mod: PBBFAC,,, | Performed by: INTERNAL MEDICINE

## 2022-03-25 PROCEDURE — 1159F PR MEDICATION LIST DOCUMENTED IN MEDICAL RECORD: ICD-10-PCS | Mod: CPTII,S$GLB,, | Performed by: INTERNAL MEDICINE

## 2022-03-25 PROCEDURE — 3072F LOW RISK FOR RETINOPATHY: CPT | Mod: CPTII,S$GLB,, | Performed by: INTERNAL MEDICINE

## 2022-03-25 PROCEDURE — 1159F MED LIST DOCD IN RCRD: CPT | Mod: CPTII,S$GLB,, | Performed by: INTERNAL MEDICINE

## 2022-03-25 PROCEDURE — 4010F ACE/ARB THERAPY RXD/TAKEN: CPT | Mod: CPTII,S$GLB,, | Performed by: INTERNAL MEDICINE

## 2022-03-25 PROCEDURE — 4010F PR ACE/ARB THEARPY RXD/TAKEN: ICD-10-PCS | Mod: CPTII,S$GLB,, | Performed by: INTERNAL MEDICINE

## 2022-03-25 PROCEDURE — 99999 PR PBB SHADOW E&M-EST. PATIENT-LVL V: CPT | Mod: PBBFAC,,, | Performed by: INTERNAL MEDICINE

## 2022-03-25 PROCEDURE — 3072F PR LOW RISK FOR RETINOPATHY: ICD-10-PCS | Mod: CPTII,S$GLB,, | Performed by: INTERNAL MEDICINE

## 2022-03-25 PROCEDURE — 99214 PR OFFICE/OUTPT VISIT, EST, LEVL IV, 30-39 MIN: ICD-10-PCS | Mod: S$GLB,,, | Performed by: INTERNAL MEDICINE

## 2022-03-25 PROCEDURE — 99214 OFFICE O/P EST MOD 30 MIN: CPT | Mod: S$GLB,,, | Performed by: INTERNAL MEDICINE

## 2022-03-25 PROCEDURE — 3044F HG A1C LEVEL LT 7.0%: CPT | Mod: CPTII,S$GLB,, | Performed by: INTERNAL MEDICINE

## 2022-03-25 PROCEDURE — 3044F PR MOST RECENT HEMOGLOBIN A1C LEVEL <7.0%: ICD-10-PCS | Mod: CPTII,S$GLB,, | Performed by: INTERNAL MEDICINE

## 2022-03-25 RX ORDER — SERTRALINE HYDROCHLORIDE 25 MG/1
25 TABLET, FILM COATED ORAL DAILY
Qty: 30 TABLET | Refills: 5 | Status: SHIPPED | OUTPATIENT
Start: 2022-03-25 | End: 2022-04-25 | Stop reason: SDUPTHER

## 2022-03-25 RX ORDER — BUPROPION HYDROCHLORIDE 300 MG/1
300 TABLET ORAL DAILY
Qty: 90 TABLET | Refills: 3 | Status: SHIPPED | OUTPATIENT
Start: 2022-03-25 | End: 2022-12-09 | Stop reason: SDUPTHER

## 2022-03-25 RX ORDER — BUDESONIDE, GLYCOPYRROLATE, AND FORMOTEROL FUMARATE 160; 9; 4.8 UG/1; UG/1; UG/1
2 AEROSOL, METERED RESPIRATORY (INHALATION) 2 TIMES DAILY
COMMUNITY
Start: 2022-01-21 | End: 2022-09-29

## 2022-03-25 RX ORDER — PREDNISONE 20 MG/1
40 TABLET ORAL DAILY
Qty: 10 TABLET | Refills: 0 | Status: SHIPPED | OUTPATIENT
Start: 2022-03-25 | End: 2022-04-25

## 2022-03-25 RX ORDER — BUPROPION HYDROCHLORIDE 300 MG/1
300 TABLET ORAL DAILY
Qty: 90 TABLET | Refills: 3 | Status: SHIPPED | OUTPATIENT
Start: 2022-03-25 | End: 2022-03-25

## 2022-03-25 RX ORDER — AZITHROMYCIN 250 MG/1
TABLET, FILM COATED ORAL
Qty: 6 TABLET | Refills: 0 | Status: SHIPPED | OUTPATIENT
Start: 2022-03-25 | End: 2022-03-30

## 2022-03-25 NOTE — ASSESSMENT & PLAN NOTE
Sees Dr. Alexa Glover, allergist. Doing inhalers right now, has been coughing/SOB last 2 weeks.  No fever/chills.  COVID test negative.

## 2022-03-25 NOTE — ASSESSMENT & PLAN NOTE
A1C 5.4 in 3/2022, on Ozempic 1 mg weekly.  Taken off janumet. No hypoglycemia.  Hasn't been checking glucoses recently.

## 2022-03-25 NOTE — ASSESSMENT & PLAN NOTE
BP controlled on valsartan 160 mg daily, verapamil 120 mg daily, no CP/SOB/HA.   Has been watching salt.

## 2022-03-25 NOTE — PROGRESS NOTES
[facility] Note    Chief Complaint      Chief Complaint   Patient presents with    Follow-up     History of Present Illness      Sanjana Queen is a 41 y.o. female who presents today for follow up of DM2.  Patient comes to appointment alone.    Problem List Items Addressed This Visit     Mild intermittent asthma with acute exacerbation    Current Assessment & Plan     Sees Dr. Alexa Glover, allergist. Doing inhalers right now, has been coughing/SOB last 2 weeks.  No fever/chills.  COVID test negative.           Relevant Medications    azithromycin (Z-OCTAVIA) 250 MG tablet    predniSONE (DELTASONE) 20 MG tablet    Class 3 severe obesity due to excess calories without serious comorbidity with body mass index (BMI) of 45.0 to 49.9 in adult    Current Assessment & Plan     Gained 18 pounds since 12/2020.  Took 1/2 month of phentermine, had to stop because she got sick.           Hypothyroid    Current Assessment & Plan     Thyroid labs WNL in 6/2021. Not on meds.            Depression with anxiety    Current Assessment & Plan     Started having vomiting after taking Viibryd, stopped cold turkey one month ago and symptoms resolved.  Anxiety has not been good.  Still on wellbutrin 300 mg daily.  Was on lexapro in past, stopped 2/2 weight gain. No SI/HI/panic attacks.              Relevant Medications    sertraline (ZOLOFT) 25 MG tablet    buPROPion (WELLBUTRIN XL) 300 MG 24 hr tablet    Type 2 diabetes mellitus without complication, without long-term current use of insulin    Current Assessment & Plan     A1C 5.4 in 3/2022, on Ozempic 1 mg weekly.  Taken off janumet. No hypoglycemia.  Hasn't been checking glucoses recently.           Hyperlipidemia due to type 2 diabetes mellitus    Current Assessment & Plan     Stable on crestor 20 mg daily, no myalgias.  The 10-year ASCVD risk score ([place] ABI Jr., et al., 2013) is: 1.6%    Values used to calculate the score:      Age: 41 years      Sex: Female       Is Non- : No      Diabetic: Yes      Tobacco smoker: No      Systolic Blood Pressure: 124 mmHg      Is BP treated: Yes      HDL Cholesterol: 40 mg/dL      Total Cholesterol: 157 mg/dL             Hypertension associated with diabetes    Current Assessment & Plan     BP controlled on valsartan 160 mg daily, verapamil 120 mg daily, no CP/SOB/HA.   Has been watching salt.             Other Visit Diagnoses     Irregular periods    -  Primary    Relevant Orders    Ambulatory referral/consult to Obstetrics / Gynecology    Encounter for screening mammogram for malignant neoplasm of breast        Relevant Orders    US Breast Bilateral Limited    Mammo Digital Screening Bilat w/ [facility]          Health Maintenance   Topic Date Due    Mammogram  04/08/2022    TETANUS VACCINE  12/17/2022 (Originally 4/21/1999)    Hemoglobin A1c  09/18/2022    Foot Exam  12/17/2022    Low Dose Statin  01/20/2023    Eye Exam  01/20/2023    Lipid Panel  03/18/2023    Hepatitis C Screening  Completed       Past Medical History:   Diagnosis Date    Anemia     Asthma     Cardiomyopathy, peripartum     GERD (gastroesophageal reflux disease)     Hypertension     Hypothyroidism     not at present    Morbid obesity     Peripartum cardiomyopathy 7/28/2015    Snoring        Past Surgical History:   Procedure Laterality Date    BLADDER SURGERY      BREAST LUMPECTOMY Left 11/12/2021    DILATION AND CURETTAGE OF UTERUS      ENDOMETRIAL ABLATION      FRACTURE SURGERY      gastric sleeve N/A     HERNIA REPAIR  2/1/2020    Dr Luu    hernia repair 2/2020      spleen surgery      TONSILLECTOMY      TUBAL LIGATION         family history includes Arthritis in her mother; Cancer in her maternal grandmother and paternal aunt; Diabetes in her father, mother, and sister; Early death in her father; Heart disease in her father and maternal grandfather; Hypertension in her father, maternal grandfather, maternal  grandmother, mother, and sister.    Social History     Tobacco Use    Smoking status: Former Smoker     Packs/day: 1.00     Years: 15.00     Pack years: 15.00     Types: Cigarettes     Start date: 3/1/1998     Quit date: 9/1/2018     Years since quitting: 3.5    Smokeless tobacco: Never Used   Substance Use Topics    Alcohol use: No    Drug use: No       Review of Systems   Constitutional: Negative for chills and fever.   HENT: Negative for sore throat.    Respiratory: Negative for cough and shortness of breath.    Cardiovascular: Negative for chest pain and palpitations.   Gastrointestinal: Negative for constipation, diarrhea, nausea and vomiting.   Genitourinary: Negative for dysuria and hematuria.   Musculoskeletal: Negative for falls.   Neurological: Negative for headaches.        Outpatient Encounter Medications as of 3/25/2022   Medication Sig Dispense Refill    albuterol (PROAIR HFA) 90 mcg/actuation inhaler Inhale 2 puffs into the lungs every 6 (six) hours as needed for Wheezing. Rescue 18 g 0    BREZTRI AEROSPHERE 160-9-4.8 mcg/actuation HFAA Inhale 2 puffs into the lungs 2 (two) times daily.      furosemide (LASIX) 40 MG tablet Take 1.5 tablets (60 mg total) by mouth daily as needed (swelling). 135 tablet 3    HIZENTRA 10 gram/50 mL (20 %) Soln Inject 28 mg into the skin once a week.      Lactobac no.41/Bifidobact no.7 (PROBIOTIC-10 ORAL)       lifitegrast (XIIDRA) 5 % Dpet Apply 1 drop to eye every 12 (twelve) hours. 180 each 4    linaCLOtide (LINZESS) 290 mcg Cap capsule Take 1 capsule (290 mcg total) by mouth once daily. 90 capsule 3    olopatadine (PAZEO) 0.7 % Drop Place 1 drop into both eyes once daily. 2.5 mL 12    pantoprazole (PROTONIX) 40 MG tablet TAKE 1 TABLET DAILY 90 tablet 3    rOPINIRole (REQUIP) 0.5 MG tablet Take 1 tablet (0.5 mg total) by mouth every evening. 90 tablet 3    rosuvastatin (CRESTOR) 20 MG tablet Take 1 tablet (20 mg total) by mouth once daily. 90 tablet 3     semaglutide (OZEMPIC) 1 mg/dose (2 mg/1.5 mL) PnIj Inject 1 mg into the skin every 7 days. 6 pen 3    triamcinolone acetonide 0.1% (KENALOG) 0.1 % cream MARQUEZ AA BID  0    valsartan (DIOVAN) 160 MG tablet Take 1 tablet (160 mg total) by mouth once daily. 90 tablet 1    verapamiL (VERELAN) 120 MG C24P Take 1 capsule (120 mg total) by mouth once daily. 90 capsule 3    [DISCONTINUED] buPROPion (WELLBUTRIN XL) 300 MG 24 hr tablet Take 1 tablet (300 mg total) by mouth once daily. 90 tablet 3    azithromycin (Z-OCTAVIA) 250 MG tablet Take 2 tablets by mouth on day 1; Take 1 tablet by mouth on days 2-5 6 tablet 0    buPROPion (WELLBUTRIN XL) 300 MG 24 hr tablet Take 1 tablet (300 mg total) by mouth once daily. 90 tablet 3    predniSONE (DELTASONE) 20 MG tablet Take 2 tablets (40 mg total) by mouth once daily. 10 tablet 0    sertraline (ZOLOFT) 25 MG tablet Take 1 tablet (25 mg total) by mouth once daily. 30 tablet 5    [DISCONTINUED] buPROPion (WELLBUTRIN XL) 300 MG 24 hr tablet Take 1 tablet (300 mg total) by mouth once daily. 90 tablet 3    [DISCONTINUED] predniSONE (DELTASONE) 20 MG tablet Take 40 mg by mouth once daily.      [DISCONTINUED] vilazodone (VIIBRYD) 40 mg Tab tablet Take 1 tablet (40 mg total) by mouth once daily. (Patient not taking: Reported on 3/25/2022) 90 tablet 3     No facility-administered encounter medications on file as of 3/25/2022.        Review of patient's allergies indicates:   Allergen Reactions    Doxycycline Anaphylaxis and Other (See Comments)       Physical Exam      Vital Signs  Temp: (P) 98.6 °F (37 °C)  Pulse: (P) 90  SpO2: (P) 98 %  BP: (P) 124/82  Pain Score: (P) 0-No pain  Height and Weight  Weight: (P) 124.2 kg (273 lb 13 oz)]  Body mass index is 48.5 kg/m² (pended).    Physical Exam  Constitutional:       Appearance: She is well-developed.   HENT:      Head: Normocephalic and atraumatic.      Right Ear: External ear normal.      Left Ear: External ear normal.   Eyes:       General:         Right eye: No discharge.         Left eye: No discharge.   Cardiovascular:      Rate and Rhythm: Normal rate and regular rhythm.      Heart sounds: Normal heart sounds. No murmur heard.  Pulmonary:      Effort: Pulmonary effort is normal. No respiratory distress.      Breath sounds: Normal breath sounds.   Abdominal:      General: There is no distension.      Palpations: Abdomen is soft.      Tenderness: There is no abdominal tenderness. There is no guarding.   Musculoskeletal:         General: Normal range of motion.      Cervical back: Normal range of motion.   Skin:     General: Skin is warm and dry.   Neurological:      Mental Status: She is alert and oriented to person, place, and time.   Psychiatric:         Behavior: Behavior normal.          Laboratory:  CBC:  No results for input(s): WBC, RBC, HGB, HCT, PLT, MCV, MCH, MCHC in the last 2160 hours.  CMP:  No results for input(s): GLU, CALCIUM, ALBUMIN, PROT, NA, K, CO2, CL, BUN, ALKPHOS, ALT, AST, BILITOT in the last 2160 hours.    Invalid input(s): CREATININ  URINALYSIS:  No results for input(s): COLORU, CLARITYU, SPECGRAV, PHUR, PROTEINUA, GLUCOSEU, BILIRUBINCON, BLOODU, WBCU, RBCU, BACTERIA, MUCUS, NITRITE, LEUKOCYTESUR, UROBILINOGEN, HYALINECASTS in the last 2160 hours.   LIPIDS:  Recent Labs   Lab Result Units 03/18/22  0810   HDL mg/dL 40   Cholesterol mg/dL 157   Triglycerides mg/dL 130   LDL Cholesterol mg/dL 91.0   HDL/Cholesterol Ratio % 25.5   Non-HDL Cholesterol mg/dL 117   Total Cholesterol/HDL Ratio  3.9     TSH:  No results for input(s): TSH in the last 2160 hours.  A1C:  Recent Labs   Lab Result Units 03/18/22  0810   Hemoglobin A1C % 5.4       Radiology:  No results found in the last 30 days.     Assessment/Plan     Sanjana Queen is a 41 y.o.female with:    1. Type 2 diabetes mellitus without complication, without long-term current use of insulin    2. Hypertension associated with diabetes    3. Hyperlipidemia due to  type 2 diabetes mellitus    4. Depression with anxiety  - sertraline (ZOLOFT) 25 MG tablet; Take 1 tablet (25 mg total) by mouth once daily.  Dispense: 30 tablet; Refill: 5  - buPROPion (WELLBUTRIN XL) 300 MG 24 hr tablet; Take 1 tablet (300 mg total) by mouth once daily.  Dispense: 90 tablet; Refill: 3    5. Acquired hypothyroidism    6. Class 3 severe obesity due to excess calories without serious comorbidity with body mass index (BMI) of 45.0 to 49.9 in adult    7. Irregular periods  - Ambulatory referral/consult to Obstetrics / Gynecology; Future    8. Mild intermittent asthma without complication  - azithromycin (Z-OCTAVIA) 250 MG tablet; Take 2 tablets by mouth on day 1; Take 1 tablet by mouth on days 2-5  Dispense: 6 tablet; Refill: 0  - predniSONE (DELTASONE) 20 MG tablet; Take 2 tablets (40 mg total) by mouth once daily.  Dispense: 10 tablet; Refill: 0    9. Encounter for screening mammogram for malignant neoplasm of breast  - US Breast Bilateral Limited; Future  - Mammo Digital Screening Bilat w/ [facility]; Future  - US Breast Bilateral Limited  - Mammo Digital Screening Bilat w/ [facility]    10. Mild intermittent asthma with acute exacerbation      -Try zoloft 25 mg daily, see me in 4 weeks  -tx for asthma exac with zpak and steroid  -MMG ordered  -Continue current medications and maintain follow up with specialists.    -Follow up in about 4 weeks (around 4/22/2022) for Virtual Visit.       Gisselle Tavera MD  [facility]

## 2022-03-25 NOTE — ASSESSMENT & PLAN NOTE
Stable on crestor 20 mg daily, no myalgias.  The 10-year ASCVD risk score ([place] ALEX Jr., et al., 2013) is: 1.6%    Values used to calculate the score:      Age: 41 years      Sex: Female      Is Non- : No      Diabetic: Yes      Tobacco smoker: No      Systolic Blood Pressure: 124 mmHg      Is BP treated: Yes      HDL Cholesterol: 40 mg/dL      Total Cholesterol: 157 mg/dL

## 2022-03-25 NOTE — ASSESSMENT & PLAN NOTE
Started having vomiting after taking Viibryd, stopped cold turkey one month ago and symptoms resolved.  Anxiety has not been good.  Still on wellbutrin 300 mg daily.  Was on lexapro in past, stopped 2/2 weight gain. No SI/HI/panic attacks.

## 2022-04-21 ENCOUNTER — PATIENT MESSAGE (OUTPATIENT)
Dept: INTERNAL MEDICINE | Facility: CLINIC | Age: 42
End: 2022-04-21
Payer: COMMERCIAL

## 2022-04-25 ENCOUNTER — OFFICE VISIT (OUTPATIENT)
Dept: INTERNAL MEDICINE | Facility: CLINIC | Age: 42
End: 2022-04-25
Payer: COMMERCIAL

## 2022-04-25 DIAGNOSIS — F41.8 DEPRESSION WITH ANXIETY: ICD-10-CM

## 2022-04-25 PROCEDURE — 4010F PR ACE/ARB THEARPY RXD/TAKEN: ICD-10-PCS | Mod: CPTII,95,, | Performed by: INTERNAL MEDICINE

## 2022-04-25 PROCEDURE — 4010F ACE/ARB THERAPY RXD/TAKEN: CPT | Mod: CPTII,95,, | Performed by: INTERNAL MEDICINE

## 2022-04-25 PROCEDURE — 1159F MED LIST DOCD IN RCRD: CPT | Mod: CPTII,95,, | Performed by: INTERNAL MEDICINE

## 2022-04-25 PROCEDURE — 1159F PR MEDICATION LIST DOCUMENTED IN MEDICAL RECORD: ICD-10-PCS | Mod: CPTII,95,, | Performed by: INTERNAL MEDICINE

## 2022-04-25 PROCEDURE — 1160F RVW MEDS BY RX/DR IN RCRD: CPT | Mod: CPTII,95,, | Performed by: INTERNAL MEDICINE

## 2022-04-25 PROCEDURE — 99214 PR OFFICE/OUTPT VISIT, EST, LEVL IV, 30-39 MIN: ICD-10-PCS | Mod: 95,,, | Performed by: INTERNAL MEDICINE

## 2022-04-25 PROCEDURE — 3072F PR LOW RISK FOR RETINOPATHY: ICD-10-PCS | Mod: CPTII,95,, | Performed by: INTERNAL MEDICINE

## 2022-04-25 PROCEDURE — 1160F PR REVIEW ALL MEDS BY PRESCRIBER/CLIN PHARMACIST DOCUMENTED: ICD-10-PCS | Mod: CPTII,95,, | Performed by: INTERNAL MEDICINE

## 2022-04-25 PROCEDURE — 3072F LOW RISK FOR RETINOPATHY: CPT | Mod: CPTII,95,, | Performed by: INTERNAL MEDICINE

## 2022-04-25 PROCEDURE — 99214 OFFICE O/P EST MOD 30 MIN: CPT | Mod: 95,,, | Performed by: INTERNAL MEDICINE

## 2022-04-25 PROCEDURE — 3044F HG A1C LEVEL LT 7.0%: CPT | Mod: CPTII,95,, | Performed by: INTERNAL MEDICINE

## 2022-04-25 PROCEDURE — 3044F PR MOST RECENT HEMOGLOBIN A1C LEVEL <7.0%: ICD-10-PCS | Mod: CPTII,95,, | Performed by: INTERNAL MEDICINE

## 2022-04-25 RX ORDER — SERTRALINE HYDROCHLORIDE 50 MG/1
50 TABLET, FILM COATED ORAL DAILY
Qty: 90 TABLET | Refills: 3 | Status: SHIPPED | OUTPATIENT
Start: 2022-04-25 | End: 2022-12-09 | Stop reason: SDUPTHER

## 2022-04-25 NOTE — ASSESSMENT & PLAN NOTE
On Zoloft 25 mg daily since last visit along with wellbutrin 300 mg daily.  Has been feeling better as far as depression goes since starting zoloft 25 mg, still feels like she loses her temper easy (especially at work).  Still has anxiety.  Was on lexapro in past, stopped 2/2 weight gain.  Viibryd caused vomiting. No SI/HI/panic attacks.

## 2022-05-09 DIAGNOSIS — G25.81 RESTLESS LEG SYNDROME: ICD-10-CM

## 2022-05-09 RX ORDER — ROPINIROLE 0.5 MG/1
TABLET, FILM COATED ORAL
Qty: 90 TABLET | Refills: 3 | Status: SHIPPED | OUTPATIENT
Start: 2022-05-09 | End: 2022-12-09

## 2022-05-16 DIAGNOSIS — R60.9 FLUID RETENTION: ICD-10-CM

## 2022-05-17 RX ORDER — FUROSEMIDE 40 MG/1
TABLET ORAL
Qty: 135 TABLET | Refills: 3 | Status: SHIPPED | OUTPATIENT
Start: 2022-05-17 | End: 2023-05-17

## 2022-05-17 NOTE — TELEPHONE ENCOUNTER
Refill Routing Note   Medication(s) are not appropriate for processing by [facility] for the following reason(s):      - Required vitals are outdated    [facility] action(s):  Defer          Medication reconciliation completed: No     Appointments  past 12m or future 3m with PCP    Date Provider   Last Visit   4/25/2022 Gisselle Tavera MD   Next Visit   Visit date not found Gisselle Tavera MD   ED visits in past 90 days: 0        Note composed:4:53 PM 05/17/2022

## 2022-05-17 NOTE — TELEPHONE ENCOUNTER
No new care gaps identified.  [facility] Embedded Care Gaps. Reference number: [id number]. 5/16/2022   11:38:24 PM CDT

## 2022-06-02 ENCOUNTER — PATIENT MESSAGE (OUTPATIENT)
Dept: OPTOMETRY | Facility: CLINIC | Age: 42
End: 2022-06-02
Payer: COMMERCIAL

## 2022-06-16 ENCOUNTER — PATIENT MESSAGE (OUTPATIENT)
Dept: OTHER | Facility: OTHER | Age: 42
End: 2022-06-16
Payer: COMMERCIAL

## 2022-08-19 ENCOUNTER — PATIENT MESSAGE (OUTPATIENT)
Dept: INTERNAL MEDICINE | Facility: CLINIC | Age: 42
End: 2022-08-19
Payer: COMMERCIAL

## 2022-08-19 RX ORDER — SULFAMETHOXAZOLE AND TRIMETHOPRIM 800; 160 MG/1; MG/1
1 TABLET ORAL 2 TIMES DAILY
Qty: 20 TABLET | Refills: 0 | Status: SHIPPED | OUTPATIENT
Start: 2022-08-19 | End: 2022-09-29

## 2022-09-02 ENCOUNTER — OFFICE VISIT (OUTPATIENT)
Dept: OBSTETRICS AND GYNECOLOGY | Facility: CLINIC | Age: 42
End: 2022-09-02
Attending: OBSTETRICS & GYNECOLOGY
Payer: COMMERCIAL

## 2022-09-02 ENCOUNTER — LAB VISIT (OUTPATIENT)
Dept: LAB | Facility: HOSPITAL | Age: 42
End: 2022-09-02
Attending: NURSE PRACTITIONER
Payer: COMMERCIAL

## 2022-09-02 VITALS
DIASTOLIC BLOOD PRESSURE: 70 MMHG | BODY MASS INDEX: 48.88 KG/M2 | SYSTOLIC BLOOD PRESSURE: 118 MMHG | HEIGHT: 63 IN | WEIGHT: 275.88 LBS

## 2022-09-02 DIAGNOSIS — N92.6 IRREGULAR PERIODS: ICD-10-CM

## 2022-09-02 DIAGNOSIS — N39.3 STRESS INCONTINENCE: ICD-10-CM

## 2022-09-02 DIAGNOSIS — E28.2 PCOS (POLYCYSTIC OVARIAN SYNDROME): ICD-10-CM

## 2022-09-02 DIAGNOSIS — N91.2 AMENORRHEA: Primary | ICD-10-CM

## 2022-09-02 LAB — TSH SERPL DL<=0.005 MIU/L-ACNC: 1.45 UIU/ML (ref 0.4–4)

## 2022-09-02 PROCEDURE — 1160F PR REVIEW ALL MEDS BY PRESCRIBER/CLIN PHARMACIST DOCUMENTED: ICD-10-PCS | Mod: CPTII,S$GLB,, | Performed by: NURSE PRACTITIONER

## 2022-09-02 PROCEDURE — 3044F PR MOST RECENT HEMOGLOBIN A1C LEVEL <7.0%: ICD-10-PCS | Mod: CPTII,S$GLB,, | Performed by: NURSE PRACTITIONER

## 2022-09-02 PROCEDURE — 3008F PR BODY MASS INDEX (BMI) DOCUMENTED: ICD-10-PCS | Mod: CPTII,S$GLB,, | Performed by: NURSE PRACTITIONER

## 2022-09-02 PROCEDURE — 99203 PR OFFICE/OUTPT VISIT, NEW, LEVL III, 30-44 MIN: ICD-10-PCS | Mod: S$GLB,,, | Performed by: NURSE PRACTITIONER

## 2022-09-02 PROCEDURE — 99999 PR PBB SHADOW E&M-EST. PATIENT-LVL V: ICD-10-PCS | Mod: PBBFAC,,, | Performed by: NURSE PRACTITIONER

## 2022-09-02 PROCEDURE — 3074F PR MOST RECENT SYSTOLIC BLOOD PRESSURE < 130 MM HG: ICD-10-PCS | Mod: CPTII,S$GLB,, | Performed by: NURSE PRACTITIONER

## 2022-09-02 PROCEDURE — 4010F PR ACE/ARB THEARPY RXD/TAKEN: ICD-10-PCS | Mod: CPTII,S$GLB,, | Performed by: NURSE PRACTITIONER

## 2022-09-02 PROCEDURE — 3078F PR MOST RECENT DIASTOLIC BLOOD PRESSURE < 80 MM HG: ICD-10-PCS | Mod: CPTII,S$GLB,, | Performed by: NURSE PRACTITIONER

## 2022-09-02 PROCEDURE — 3078F DIAST BP <80 MM HG: CPT | Mod: CPTII,S$GLB,, | Performed by: NURSE PRACTITIONER

## 2022-09-02 PROCEDURE — 3072F LOW RISK FOR RETINOPATHY: CPT | Mod: CPTII,S$GLB,, | Performed by: NURSE PRACTITIONER

## 2022-09-02 PROCEDURE — 3044F HG A1C LEVEL LT 7.0%: CPT | Mod: CPTII,S$GLB,, | Performed by: NURSE PRACTITIONER

## 2022-09-02 PROCEDURE — 3008F BODY MASS INDEX DOCD: CPT | Mod: CPTII,S$GLB,, | Performed by: NURSE PRACTITIONER

## 2022-09-02 PROCEDURE — 3074F SYST BP LT 130 MM HG: CPT | Mod: CPTII,S$GLB,, | Performed by: NURSE PRACTITIONER

## 2022-09-02 PROCEDURE — 84443 ASSAY THYROID STIM HORMONE: CPT | Performed by: NURSE PRACTITIONER

## 2022-09-02 PROCEDURE — 3072F PR LOW RISK FOR RETINOPATHY: ICD-10-PCS | Mod: CPTII,S$GLB,, | Performed by: NURSE PRACTITIONER

## 2022-09-02 PROCEDURE — 1159F MED LIST DOCD IN RCRD: CPT | Mod: CPTII,S$GLB,, | Performed by: NURSE PRACTITIONER

## 2022-09-02 PROCEDURE — 99203 OFFICE O/P NEW LOW 30 MIN: CPT | Mod: S$GLB,,, | Performed by: NURSE PRACTITIONER

## 2022-09-02 PROCEDURE — 99999 PR PBB SHADOW E&M-EST. PATIENT-LVL V: CPT | Mod: PBBFAC,,, | Performed by: NURSE PRACTITIONER

## 2022-09-02 PROCEDURE — 4010F ACE/ARB THERAPY RXD/TAKEN: CPT | Mod: CPTII,S$GLB,, | Performed by: NURSE PRACTITIONER

## 2022-09-02 PROCEDURE — 1160F RVW MEDS BY RX/DR IN RCRD: CPT | Mod: CPTII,S$GLB,, | Performed by: NURSE PRACTITIONER

## 2022-09-02 PROCEDURE — 1159F PR MEDICATION LIST DOCUMENTED IN MEDICAL RECORD: ICD-10-PCS | Mod: CPTII,S$GLB,, | Performed by: NURSE PRACTITIONER

## 2022-09-02 RX ORDER — SEMAGLUTIDE 1.34 MG/ML
INJECTION, SOLUTION SUBCUTANEOUS
COMMUNITY
Start: 2022-06-26 | End: 2022-09-19

## 2022-09-02 RX ORDER — PROGESTERONE 100 MG/1
100 CAPSULE ORAL NIGHTLY
Qty: 30 CAPSULE | Refills: 11 | Status: SHIPPED | OUTPATIENT
Start: 2022-09-02 | End: 2023-01-13 | Stop reason: ALTCHOICE

## 2022-09-02 NOTE — PROGRESS NOTES
History & Physical  Gynecology      SUBJECTIVE:     Chief Complaint: Menstrual Problem       History of Present Illness: Patient presents to clinic for assessment of irregular menses. She reports no menstrual cycle x 11-12 month but return of menses 3 weeks ago. Known history of PCOS, T2DM with use of Metformin/Ozempic, Hypothyroidism. Bilateral salpingectomy, uterine ablation following, however, continued to have normal menses until this year. 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    Recent PCP labs with total estrogens of 113, low FSH level.     Stress incontinence for several years, has attempted Kegel with minimal success.     Review of patient's allergies indicates:   Allergen Reactions    Doxycycline Anaphylaxis and Other (See Comments)       Past Medical History:   Diagnosis Date    Anemia     Asthma     Cardiomyopathy, peripartum     GERD (gastroesophageal reflux disease)     Hypertension     Hypothyroidism     not at present    Morbid obesity     Peripartum cardiomyopathy 7/28/2015    Snoring      Past Surgical History:   Procedure Laterality Date    BLADDER SURGERY      BREAST LUMPECTOMY Left 11/12/2021    DILATION AND CURETTAGE OF UTERUS      ENDOMETRIAL ABLATION      FRACTURE SURGERY      gastric sleeve N/A     HERNIA REPAIR  2/1/2020    Dr Luu    hernia repair 2/2020      spleen surgery      TONSILLECTOMY      TUBAL LIGATION       OB History    No obstetric history on file.       Family History   Problem Relation Age of Onset    Diabetes Mother     Arthritis Mother         Ankles    Hypertension Mother     Diabetes Father     Hypertension Father     Heart disease Father     Early death Father         65 years of age heart Failure    Hypertension Maternal Grandmother     Cancer Maternal Grandmother     Hypertension Maternal Grandfather     Heart disease Maternal Grandfather     Hypertension Sister     Diabetes Sister     Cancer Paternal Aunt         Breast Cancer     Social  History     Tobacco Use    Smoking status: Former     Packs/day: 1.00     Years: 15.00     Pack years: 15.00     Types: Cigarettes     Start date: 3/1/1998     Quit date: 2018     Years since quittin.0    Smokeless tobacco: Never   Substance Use Topics    Alcohol use: No    Drug use: No       Current Outpatient Medications   Medication Sig    albuterol (PROAIR HFA) 90 mcg/actuation inhaler Inhale 2 puffs into the lungs every 6 (six) hours as needed for Wheezing. Rescue    buPROPion (WELLBUTRIN XL) 300 MG 24 hr tablet Take 1 tablet (300 mg total) by mouth once daily.    furosemide (LASIX) 40 MG tablet TAKE ONE AND ONE-HALF TABLETS DAILY AS NEEDED FOR SWELLING    HIZENTRA 10 gram/50 mL (20 %) Soln Inject 28 mg into the skin once a week.    Lactobac no.41/Bifidobact no.7 (PROBIOTIC-10 ORAL)     lifitegrast (XIIDRA) 5 % Dpet Apply 1 drop to eye every 12 (twelve) hours.    linaCLOtide (LINZESS) 290 mcg Cap capsule Take 1 capsule (290 mcg total) by mouth once daily.    olopatadine (PAZEO) 0.7 % Drop Place 1 drop into both eyes once daily.    pantoprazole (PROTONIX) 40 MG tablet TAKE 1 TABLET DAILY    rOPINIRole (REQUIP) 0.5 MG tablet TAKE 1 TABLET EVERY EVENING    rosuvastatin (CRESTOR) 20 MG tablet Take 1 tablet (20 mg total) by mouth once daily.    sertraline (ZOLOFT) 50 MG tablet Take 1 tablet (50 mg total) by mouth once daily.    triamcinolone acetonide 0.1% (KENALOG) 0.1 % cream MARQUEZ AA BID    valsartan (DIOVAN) 160 MG tablet Take 1 tablet (160 mg total) by mouth once daily.    verapamiL (VERELAN) 120 MG C24P Take 1 capsule (120 mg total) by mouth once daily.    BREZTRI AEROSPHERE 160-9-4.8 mcg/actuation HFAA Inhale 2 puffs into the lungs 2 (two) times daily.    OZEMPIC 1 mg/dose (4 mg/3 mL) Inject into the skin.    progesterone (PROMETRIUM) 100 MG capsule Take 1 capsule (100 mg total) by mouth nightly.    sulfamethoxazole-trimethoprim 800-160mg (BACTRIM DS) 800-160 mg Tab Take 1 tablet by mouth 2 (two) times  daily. (Patient not taking: Reported on 9/2/2022)     No current facility-administered medications for this visit.         Review of Systems:  Review of Systems   Constitutional:  Negative for activity change, appetite change, chills, fatigue and fever.   HENT:  Negative for mouth sores.    Eyes:  Negative for visual disturbance.   Respiratory:  Negative for cough and shortness of breath.    Cardiovascular:  Negative for chest pain and leg swelling.   Gastrointestinal:  Negative for abdominal pain, constipation, diarrhea, nausea, vomiting and reflux.   Endocrine: Negative for hyperthyroidism and hypothyroidism.   Genitourinary:  Negative for dysuria, flank pain, frequency, genital sores, hematuria, menstrual problem, pelvic pain, vaginal bleeding, vaginal discharge, vaginal pain, vaginal dryness and vaginal odor.        Amenorrhea   Musculoskeletal:  Negative for arthralgias, back pain and myalgias.   Integumentary:  Negative for rash, breast mass and breast tenderness.   Neurological:  Negative for headaches.   Hematological:  Negative for adenopathy. Does not bruise/bleed easily.   Psychiatric/Behavioral:  Negative for depression. The patient is not nervous/anxious.    Breast: Negative for asymmetry, mass and tenderness     OBJECTIVE:     Physical Exam:  Physical Exam  Vitals and nursing note reviewed.   Constitutional:       Appearance: Normal appearance.   HENT:      Head: Normocephalic and atraumatic.      Nose: Nose normal.      Mouth/Throat:      Mouth: Mucous membranes are moist.   Eyes:      Conjunctiva/sclera: Conjunctivae normal.   Cardiovascular:      Rate and Rhythm: Normal rate.   Pulmonary:      Effort: Pulmonary effort is normal.      Breath sounds: Normal breath sounds.   Abdominal:      Palpations: Abdomen is soft.   Genitourinary:     General: Normal vulva.      Vagina: Normal.      Cervix: Normal.      Uterus: Normal.       Adnexa: Right adnexa normal and left adnexa normal.   Musculoskeletal:          General: Normal range of motion.      Cervical back: Normal range of motion.   Skin:     General: Skin is warm and dry.   Neurological:      General: No focal deficit present.      Mental Status: She is alert.   Psychiatric:         Mood and Affect: Mood normal.         Behavior: Behavior normal.         Thought Content: Thought content normal.         Judgment: Judgment normal.         ASSESSMENT:       ICD-10-CM ICD-9-CM    1. Amenorrhea  N91.2 626.0       2. Irregular periods  N92.6 626.4 Ambulatory referral/consult to Obstetrics / Gynecology      TSH      progesterone (PROMETRIUM) 100 MG capsule      3. Stress incontinence  N39.3 [id number] Ambulatory referral/consult to Physical/Occupational Therapy      4. PCOS (polycystic ovarian syndrome)  E28.2 256.4              Plan:      Amenorrhea suspect for PCOS, will start on Prometrium nightly for regulation of menses. She will notify if continued irregularity for further testing.    Assisted to schedule for [employer].    FU with NP as needed.    Counseling time: 20 minutes       CASANDRA Pugh

## 2022-09-21 ENCOUNTER — PATIENT MESSAGE (OUTPATIENT)
Dept: INTERNAL MEDICINE | Facility: CLINIC | Age: 42
End: 2022-09-21
Payer: COMMERCIAL

## 2022-09-22 ENCOUNTER — PATIENT MESSAGE (OUTPATIENT)
Dept: ORTHOPEDICS | Facility: CLINIC | Age: 42
End: 2022-09-22
Payer: COMMERCIAL

## 2022-09-23 ENCOUNTER — PATIENT MESSAGE (OUTPATIENT)
Dept: ORTHOPEDICS | Facility: CLINIC | Age: 42
End: 2022-09-23
Payer: COMMERCIAL

## 2022-09-23 DIAGNOSIS — R52 PAIN: Primary | ICD-10-CM

## 2022-09-27 ENCOUNTER — CLINICAL SUPPORT (OUTPATIENT)
Dept: REHABILITATION | Facility: HOSPITAL | Age: 42
End: 2022-09-27
Payer: COMMERCIAL

## 2022-09-27 DIAGNOSIS — N39.3 STRESS INCONTINENCE: ICD-10-CM

## 2022-09-27 PROCEDURE — 97161 PT EVAL LOW COMPLEX 20 MIN: CPT | Mod: PO

## 2022-09-27 NOTE — PATIENT INSTRUCTIONS
Home Exercise Program: 09/27/2022    Kegels    Endurance Holds  Perform a long kegel (contract and LIFT the pelvic floor muscles as if you're trying to stop the stream of urine and passage of gas).    Make sure you're just using the internal muscles without holding your breath.  Let go and relax everything for 10 seconds.   Hold 3 seconds. Repeat 5 times, 6 sets per day.

## 2022-09-27 NOTE — PROGRESS NOTES
[facility]  Pelvic Health Physical Therapy Initial Evaluation    Date: 9/27/2022   Name: Sanjana Spencer [facility] Number: [id number]  Therapy Diagnosis: No diagnosis found.  Physician: Philomena Wallis, NP    Physician Orders: Pelvic PT Eval and Treat  Medical Diagnosis from Referral: Stress incontinence [N39.3]  Evaluation Date: 9/27/2022  Authorization Period Expiration: TBD  Plan of Care Expiration: 12/20/2022  Visit # / Visits authorized: 1/1    Time In: 8:57 am  Time Out: 9:50  Total Appointment Time (timed & untimed codes): 53 minutes    Precautions: universal    Subjective     Date of onset: KARSON for at least 10 years    History of current condition - Sanjana reports: leakage with cough, laugh,sneeze, sit to stand, exercise. As a child, she had a lot of bladder infections. She had aprcoedure as a child but is unsiure of exactly what it was. Her mother thinks they trimmed her urethra.No history of bladder infections/UTI as an adult.    Takes lasix, has been taking this for 10+years, but after KARSON started      OB/GYN History:  pushing phase over 2 hours, painful periods, and 2 miscarriages, 1 was ectopic pregnancy, last pregnancy 9 years, vaginal birth  Sexually active? Yes  Pain with vaginal exams, intercourse or tampon use? No  Prolapse symptoms? No  Bladder/Bowel History:   Frequency of urination:   Daytime: 6-8x           Nighttime: 1x consistently  Difficulty initiating urine stream: No  Urine stream: strong  Complete emptying: Yes  Bladder leakage: Yes  Frequency of incidents: multiple times per day  Amount leaked (urine): small squirt , large squirt, and moderate amount  Urinary Urgency: No  Able to delay the urge for at least 60 minute(s).  Frequency of bowel movements: once a day   Difficulty initiating BM: No - assuming she has taken her medication  Quality/Shape of BM: Orange Stool Chart 6  Does Patient Feel Empty after BM? Yes  Fiber Supplements or Laxative Use?  No but taking  Linzess  Colon leakage: No  Form of protection:  thinx absorbent underwear  Number of pads required in 24 hours: 1 per day; was previously going through 5-6 thick pads per day    As a precaution to Monkeypox, have you experienced a new rash with blisters? No    Have you been in contact with someone with either confirmed or suspected monkeypox? 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  Prior Level of Function: Independent  Current Level of Function: Independent    Types of fluid intake: unsweet tea with caffeine 64-96 oz and coffee   Diet: unremarkable   Habitus:overweight  Abuse/Neglect: No     PAIN:patient denies having any pain      Medical History: Sanjana  has a past medical history of Anemia, Asthma, Cardiomyopathy, peripartum, GERD (gastroesophageal reflux disease), Hypertension, Hypothyroidism, Morbid obesity, Peripartum cardiomyopathy (7/28/2015), and Snoring.     Surgical History:  Sanjana Queen  has a past surgical history that includes Tonsillectomy; Fracture surgery; Bladder surgery; spleen surgery; Tubal ligation; Dilation and curettage of uterus; Endometrial ablation; gastric sleeve (N/A); hernia repair 2/2020; Hernia repair (2/1/2020); and Breast lumpectomy (Left, 11/12/2021).    Medications: Sanjana has a current medication list which includes the following prescription(s): albuterol, breztri aerosphere, bupropion, furosemide, hizentra, lactobac no.41/bifidobact no.7, lifitegrast, linaclotide, pazeo, pantoprazole, progesterone, ropinirole, rosuvastatin, ozempic, sertraline, sulfamethoxazole-trimethoprim 800-160mg, triamcinolone acetonide 0.1%, valsartan, and verapamil.    Allergies:   Review of patient's allergies  indicates:   Allergen Reactions    Doxycycline Anaphylaxis and Other (See Comments)        Imaging See EMR for full imaging workup    Pts goals: Reduce leakage    OBJECTIVE     See EMR under MEDIA for written consent provided 9/27/2022  Chaperone: declined    ORTHO SCREEN  Posture in sitting: slouched   Posture in standing: forward head and forward and rounded shoulders   Pelvic alignment: no sign of deviations noted in supine     ABDOMINAL WALL ASSESSMENT  Palpation: hypotonic   Scarring: left abdominal scar noted; mobile and non tender  Pelvic Floor Muscle and Transverse Abdominus Synergy: absent  Diastasis: not assessed today     BREATHING MECHANICS ASSESSMENT   Thorax Assessment During Quiet Respiration: WNL excursion of ribcage and WNL excursion of abdominal wall  Thorax Assessment During Deep Respiration: Decreased excursion of abdominal wall     VAGINAL PELVIC FLOOR EXAM  EXTERNAL ASSESSMENT  Introitus: WNL  Skin condition: WNL  Scarring: none   Sensation: WNL   Pain:  none  Voluntary contraction: nil  Voluntary relaxation: nil  Involuntary contraction: bulge  Bearing down: bulge  Perineal descent: absent      INTERNAL ASSESSMENT  Pain: none   Sensation: able to localized pressure appropriately   Vaginal vault: WNL   Muscle Bulk: WFL   Muscle Power: 1/5  Muscle Endurance: 3 sec  # Reps To Fatigue: 2    Fast Contractions in 10 seconds: 6     Quality of contraction: slow rise, decreased hold, and slow relaxation   Specificity: WNL   Coordination: WNL   Prolapse check:not assessed today      Limitation/Restriction for FOTO PFDI Urinary and Urinary Problem Surveys    Therapist reviewed FOTO scores for Sanjana Queen on 9/27/2022.   FOTO documents entered into 24h00 - see Media section.    Limitation Score:  PFDI Urinary - 50  Urinary Problem - 53       TREATMENT     Patient Education provided:   general anatomy/physiology of urinary/ bowel  system and benefits of treatment were discussed with the pt.  Additionally, bladder irritants, anatomy/physiology of pelvic floor, bladder retraining, diaphragmatic breathing, and kegels were reviewed.     Home Exercises Provided:  Written Home Exercises Provided: yes.  Exercises were reviewed and Sanjana was able to demonstrate them prior to the end of the session.    Sanjana demonstrated good  understanding of the education provided.     See EMR under Patient Instructions for exercises provided 9/27/2022.    Assessment     Sanjana is a 42 y.o. female referred to outpatient Physical Therapy with a medical diagnosis of Stress incontinence [N39.3]. Pt presents with altered posture, decreased pelvic muscle strength, decreased endurance of the pelvic muscles, decreased phasic ability of the pelvic muscles, poor quality of pelvic muscle contraction, poor coordination of pelvic floor muscles during ADL's leading to urinary or fecal leakage, and dysfunctional voiding.     Pt prognosis is Fair.   Pt will benefit from skilled outpatient Physical Therapy to address the deficits stated above and in the chart below, provide pt/family education, and to maximize pt's level of independence.     Plan of care discussed with patient: Yes  Pt's spiritual, cultural and educational needs considered and patient is agreeable to the plan of care and goals as stated below:       Anticipated Barriers for therapy: none    Medical Necessity is demonstrated by the following    History  Co-morbidities and personal factors that may impact the plan of care Co-morbidities:   COPD/asthma, high BMI, HTN, leiomyoma/fibroids, and GERD (gastroesophageal reflux disease), Hypothyroidism, Peripartum cardiomyopathy    Personal Factors:   no deficits     moderate   Examination  Body Structures and Functions, activity limitations and participation restrictions that may impact the plan of care Body Regions/Systems/Functions:  altered posture, decreased pelvic muscle strength, decreased endurance of the pelvic muscles,  "decreased phasic ability of the pelvic muscles, poor quality of pelvic muscle contraction, poor coordination of pelvic floor muscles during ADL's leading to urinary or fecal leakage, and dysfunctional voiding    Activity Limitations:  delaying urge to urinate, incontinence with ADLs, and bathroom mapping    Participation Restrictions:  social activities with friends/family and exercise restrictions due to incontinence     Activity limitations:   Learning and applying knowledge  no deficits    General Tasks and Commands  no deficits    Communication  no deficits    Mobility  no deficits    Self care  no deficits    Domestic Life  no deficits    Interactions/Relationships  no deficits    Life Areas  no deficits    Community and Social Life  no deficits       low   Clinical Presentation stable and uncomplicated low   Decision Making/ Complexity Score: low     Short Term Goals: 6 weeks  Goal Status   Pt to be edu pelvic muscle bracing and be able to consistently perform correctly and quickly to help decrease incontinence with cough/laugh/sneeze. New   Pt to perform "the knack" prior to coughing, laughing or sneezing to decrease risk of incontinence. New   Pt to demonstrate being able to correctly and consistently perform a kegel which is needed  to increase pelvic floor muscle coordination and strength needed for continence. New   Pt to report being able to transfer from sitting to standing without leakage of urine. New   Pt to voice understanding of the role that diet plays on urinary urgency.   New   Pt to demonstrate an improved score in the FOTO Urinary Problem survey  to at least 56 to demonstrate improving symptoms and self management of condition.   New     Long Term Goals: 12 weeks  Goal Status   Pt to be discharged with home plan for carry over after discharge.   New   Pt to report reduction of urinary incontinence by at least 50% with ADLs. New   Pt to report no longer feeling the need to urinate just in case " when shopping or participating in social activities to demonstrate improving pelvic floor and bladder control. New   Pt to demonstrate an improved score in the FOTO Urinary Problem survey  to at least 63 to demonstrate improving symptoms and self management of condition.   New   Pt to increase pelvic floor strength to at least 3/5 to demonstrate improved strength needed for continence with ADLs.  New       Plan     Plan of care Certification: 9/27/2022 to 12/20/2022.    Outpatient Physical Therapy 1 times weekly for 12 weeks to include the following interventions: therapeutic exercises, therapeutic activity, neuromuscular re-education, manual therapy, modalities PRN, patient/family education, dry needling, and self care/home management. PT may adjust visit frequency based upon patient's progression through plan of care.    I appreciate your consult and look forward to participating in this patient's care.    Catherine Brunner, PT, DPT

## 2022-09-29 ENCOUNTER — HOSPITAL ENCOUNTER (OUTPATIENT)
Dept: RADIOLOGY | Facility: HOSPITAL | Age: 42
Discharge: HOME | End: 2022-09-29
Attending: ORTHOPAEDIC SURGERY
Payer: COMMERCIAL

## 2022-09-29 ENCOUNTER — OFFICE VISIT (OUTPATIENT)
Dept: ORTHOPEDICS | Facility: CLINIC | Age: 42
End: 2022-09-29
Payer: COMMERCIAL

## 2022-09-29 VITALS — BODY MASS INDEX: 49.36 KG/M2 | WEIGHT: 278.56 LBS | HEIGHT: 63 IN

## 2022-09-29 DIAGNOSIS — M72.2 PLANTAR FASCIITIS, LEFT: Primary | ICD-10-CM

## 2022-09-29 DIAGNOSIS — M25.371 ANKLE INSTABILITY, RIGHT: ICD-10-CM

## 2022-09-29 DIAGNOSIS — R52 PAIN: ICD-10-CM

## 2022-09-29 PROCEDURE — 73610 X-RAY EXAM OF ANKLE: CPT | Mod: 26,RT,, | Performed by: RADIOLOGY

## 2022-09-29 PROCEDURE — 3008F BODY MASS INDEX DOCD: CPT | Mod: CPTII,S$GLB,, | Performed by: ORTHOPAEDIC SURGERY

## 2022-09-29 PROCEDURE — 73630 X-RAY EXAM OF FOOT: CPT | Mod: 26,LT,, | Performed by: RADIOLOGY

## 2022-09-29 PROCEDURE — 1159F MED LIST DOCD IN RCRD: CPT | Mod: CPTII,S$GLB,, | Performed by: ORTHOPAEDIC SURGERY

## 2022-09-29 PROCEDURE — 3044F HG A1C LEVEL LT 7.0%: CPT | Mod: CPTII,S$GLB,, | Performed by: ORTHOPAEDIC SURGERY

## 2022-09-29 PROCEDURE — 3044F PR MOST RECENT HEMOGLOBIN A1C LEVEL <7.0%: ICD-10-PCS | Mod: CPTII,S$GLB,, | Performed by: ORTHOPAEDIC SURGERY

## 2022-09-29 PROCEDURE — 73630 XR FOOT COMPLETE 3 VIEW LEFT: ICD-10-PCS | Mod: 26,LT,, | Performed by: RADIOLOGY

## 2022-09-29 PROCEDURE — 4010F PR ACE/ARB THEARPY RXD/TAKEN: ICD-10-PCS | Mod: CPTII,S$GLB,, | Performed by: ORTHOPAEDIC SURGERY

## 2022-09-29 PROCEDURE — 4010F ACE/ARB THERAPY RXD/TAKEN: CPT | Mod: CPTII,S$GLB,, | Performed by: ORTHOPAEDIC SURGERY

## 2022-09-29 PROCEDURE — 73610 X-RAY EXAM OF ANKLE: CPT | Mod: TC,RT

## 2022-09-29 PROCEDURE — 73610 XR ANKLE COMPLETE 3 VIEW RIGHT: ICD-10-PCS | Mod: 26,RT,, | Performed by: RADIOLOGY

## 2022-09-29 PROCEDURE — 99999 PR PBB SHADOW E&M-EST. PATIENT-LVL IV: CPT | Mod: PBBFAC,,, | Performed by: ORTHOPAEDIC SURGERY

## 2022-09-29 PROCEDURE — 73630 X-RAY EXAM OF FOOT: CPT | Mod: TC,LT

## 2022-09-29 PROCEDURE — 99204 PR OFFICE/OUTPT VISIT, NEW, LEVL IV, 45-59 MIN: ICD-10-PCS | Mod: S$GLB,,, | Performed by: ORTHOPAEDIC SURGERY

## 2022-09-29 PROCEDURE — 3072F PR LOW RISK FOR RETINOPATHY: ICD-10-PCS | Mod: CPTII,S$GLB,, | Performed by: ORTHOPAEDIC SURGERY

## 2022-09-29 PROCEDURE — 3008F PR BODY MASS INDEX (BMI) DOCUMENTED: ICD-10-PCS | Mod: CPTII,S$GLB,, | Performed by: ORTHOPAEDIC SURGERY

## 2022-09-29 PROCEDURE — 99999 PR PBB SHADOW E&M-EST. PATIENT-LVL IV: ICD-10-PCS | Mod: PBBFAC,,, | Performed by: ORTHOPAEDIC SURGERY

## 2022-09-29 PROCEDURE — 3072F LOW RISK FOR RETINOPATHY: CPT | Mod: CPTII,S$GLB,, | Performed by: ORTHOPAEDIC SURGERY

## 2022-09-29 PROCEDURE — 1159F PR MEDICATION LIST DOCUMENTED IN MEDICAL RECORD: ICD-10-PCS | Mod: CPTII,S$GLB,, | Performed by: ORTHOPAEDIC SURGERY

## 2022-09-29 PROCEDURE — 99204 OFFICE O/P NEW MOD 45 MIN: CPT | Mod: S$GLB,,, | Performed by: ORTHOPAEDIC SURGERY

## 2022-09-29 NOTE — PATIENT INSTRUCTIONS
"Today, you saw Dr. Chambers and were diagnosed with 1.) right multidirectional instability with spurs and 2.) left worse than right plantar fasciitis    We decided the next step(s) in in treatment is/are  RICE guidelines (see below)  Anti-inflammatory medications (see below)  Therapy: Plantar fasciitis stretching exercises provided and HEP demonstrated and taught to the patient today- see below  Activity: Use pain as your guide, advance your activities as tolerated     Thank you for allowing me to participate in your care.  We will see you back in 6 weeks.    How to treat inflammation?  1.) What does your doctor mean when they tell you to follow R.I.C.E. Guidelines?    Rest your ankle/foot by limiting activities that cause pain.  A good indicator of activity level is your pain the night following the activity and the day after.  If you have pain that lasts until the next day, you did too much.  Ice it to keep the swelling down. Don't put ice directly on the skin (use a thin piece of cloth between the ice bag and skin) and don't ice more than 20 minutes at a time to avoid frostbite.  Compressive bandages immobilize and support your injury.  Make sure it isn't too tight - your toes should not be turning purple and the wrap should not hurt.  Elevate your ankle above your heart level - "toes above your nose". This applies to acute injuries and should be followed for first 48 hours as well as afterward when you have increased pain and swelling after activity or therapy.    2.) Another common way of treating pain and inflammation from an injury is anti-inflammatory medications.  Dr. Chambers may prescribe you a medication for inflammation or you can take an over-the-counter anti-inflammatory (also known as NSAIDs - nonsteroidal anti-inflammatory drugs).  These include such medications as aleve, motrin, ibuprofen, naproxen, etc.  They should be taken as prescribed or according to the over-the-counter packaging instructions.  " These medications can upset the stomach or rare cases causes ulcer disease or kidney injury.  If you are concerned about using these medications long-term, you should discuss it with you primary doctor.  You can also combine or substitute an NSAID with acetaminophen (commonly known as Tylenol).  Take according to bottle instructions, and you can take up to 3000 mg daily (important to keep in mind if you take other medications that contain acetaminophen).  Again long term use should be discussed with your primary doctor.    Plantar fasciitis is irritation of the thick band of tissue that supports your arch - it runs from the ball of the foot to the heel bone and you can feel it taught when you stretch your big toe up.  STRETCHING is the mainstay of treatment for this and TIME.  Symptoms often last for 3-6 months even with good treatment, but the good news is that with diligent treatment 90% gets better.    We decided the next step(s) in in treatment is/are  Plantar fascia specific exercise program  Inserts/shoe modifications: Gel heel cup insert and Arch band strap  Next step if this doesn't work: steroid injection  For more information on treatment options, I recommend you visit [email or web address]    WHAT IS PLANTAR FASCIITIS?  If your first few steps out of bed in the morning cause severe pain in the heel of your foot, you may have plantar fasciitis, an overuse injury that affects the sole of the foot. A diagnosis of plantar fasciitis means you have inflamed the tough, fibrous band of tissue (fascia) connecting your heel bone to the base of your toes.     https://orthoinfo.aaos.org/globalassets/pdfs/planter-fasciitis.pdf    Causes  You're more likely to develop the condition if you're female, overweight or have a job that requires a lot of walking or standing on hard surfaces. You're also at risk if you walk or run for exercise, especially if you have tight calf muscles that  limit how far you can flex your ankles. People with very flat feet or very high arches also are more prone to plantar fasciitis.    Symptoms  Plantar fasciitis typically starts gradually with mild pain at the heel bone often referred to as a stone bruise. You're more likely to feel it after (not during) exercise. The pain classically occurs right after getting up in the morning and after a period of sitting. If you don't treat plantar fasciitis, it may become a chronic condition. You may not be able to keep up your level of activity, and you may develop symptoms of foot, knee, hip and back problems because plantar fasciitis can change the way you walk.    Treatments  Stretching is the best treatment for plantar fasciitis.  See the several provided exercises below to stretch both your plantar fascia as well as the calf.      Anti-inflammatory medications can help decrease the inflammation in the arch and heel of your foot. These medications include Advil®, Motrin®, Ibuprofen, and Aleve®. Use the medication as directed on the package. If you tolerate it well, take it daily for two weeks then discontinue for one week. If symptoms worsen or return, resume for two weeks, then stop. You should eat when taking these medications, as they can be hard on your stomach.    Over the counter inserts provide added arch support and soft cushion. Some patients will require custom inserts, and your surgeon may provide a prescription for these, but they can be an expensive out of pocket expense if your insurance does not cover them.  Often simply a supportive heel cushion or arch band can help relieve symptoms when combined with stretching.    If your plantar fasciitis continues after a few months of conservative treatment, your doctor may inject your heel with steroidal anti-inflammatory medication.If you still have symptoms, you may need to wear a walking cast for two to three weeks or a positional splint when you sleep. In a few  cases, surgery is needed for chronically contracted tissue.     Plantar Fasciitis Exercises  Toe Curls With Towel    1. Place a small towel on the floor. Using involved foot, curl towel toward you, using only your toes. Relax.  2. Repeat 10 times, 1-2 times per day.    Toe Extension    1. Sit with involved leg crossed over uninvolved leg. Grasp toes with one hand and bend the toes and ankle upwards as far as possible to stretch the arch and calf muscle. With the other hand, perform deep massage along the arch of your foot.  2. Hold 10 seconds. Repeat for 2-3 minutes. Repeat 2-4 sessions per day.    Standing Calf Stretch    1. Stand placing hands on wall for support. Place your feet pointing straight ahead, with the involved foot in back of the other. The back leg should have a straight knee and front leg a bent knee. Shift forward, keeping back leg heel on the ground, so that you feel a stretch in the calf muscle of the back leg.  2. Hold 45 seconds, 2-3 times. Repeat 4-6 times per day.    Towel Stretch    The towel stretch is effective at reducing morning pain if done before getting out of bed.  1. Sit with involved leg straight out in front of you. Place a towel around your foot and gently pull toward you, feeling a stretch in your calf muscle.  2. Hold 45 seconds, 2-3 times. Repeat 4-6 times per day.     Calf Stretch on a Step    1. Stand with uninvolved foot flat on a step. Place involved ball of foot on the edge of the step. Gently let heel lower on involved leg to feel a stretch in your calf.  2. Hold 45 seconds, 2-3 times. Repeat 4-6 times per day.    Ice Massage Arch Roll    1. With involved foot resting on a frozen can or water bottle, golf ball, or tennis ball, roll your foot back and forth over the object.  2. Repeat for 3-5 minutes, 2 times per  day.      References:  [email or web address]/content/Education/3691/Patient-Education/Educational-Materials/Plantar-Fasciitis-Exercises.aspx    https://www.footcaremd.org/conditions-treatments/heel/plantar-fasciitis    Strengthening Exercises   Strengthening exercises keep your muscles firm and strong. Stand or sit up tall on a chair without arms for your exercises. Start by repeating each exercise 2 to 3 times. Work up to doing each exercise 10 times. Try to do the exercises 2 to 3 times each day. Do all exercises slowly.  Ankle pumps seated ? Move each foot up and down like you are pressing down and lifting up on a gas pedal.  Ankle alphabet seated ? Act like you are writing the alphabet with each foot. Do not move your whole leg to do this, just move your ankle. Do all of the alphabet. Take short rests if you get tired.  Exercise band exercises ? Sit on the floor with your legs out in front of you for these. You can also sit in a chair.  Pulling foot up ? You will have to have someone hold the band for this exercise. Otherwise, you can tie a large knot into each end of the band and close the ends of the band in the bottom of a door. Have the band around the top of your foot. Pull your foot up towards your head. Bring your foot back to the starting position. Switch feet and repeat.  Pushing foot down ? Loop the band around the ball of the foot. Push down as if you were pressing on a gas pedal. Bring the foot back to the starting position. Switch feet and repeat.  Pulling foot in ? Loop the band around the ball of one foot. Cross your other leg over the leg with the band around it. Put the top foot on top of the band as if you were stepping on it. Pull the bottom foot in. Bring the foot back to the starting position. Switch feet and repeat.  Pulling foot out ? Loop the band around the ball of one foot. Step on the band with your other foot and straighten the leg. Pull the bottom foot out. Bring the foot back  to the starting position. Switch feet and repeat.  Heel raises ? Hold on to a counter. Lift your heels up and rise up onto your toes. Lower yourself back down.  Toe lifts ? Lift your toes up and put your weight onto your heels. Hold 3 to 5 seconds.  Single leg balance ? Lift one leg up and balance on the other leg for 10 to 30 seconds. Repeat 5 times. To make this exercise harder, try putting a thin pillow under your foot.         Standing exercises - when these get easy, start doing them while standing on a couch cushion or pillow or a balance board.          How to strengthening your ankle stabilizers      Inversion (Eccentric), (Resistance Band)        Pull foot in against resistance band. Slowly release for 3-5 seconds. Use resistance band.  10 reps per set, 1-2 sets per day, 7 days per week.     Copyright © [employer]. All rights reserved.   Eversion (Eccentric), (Resistance Band)        Push foot outward against resistance band. Slowly release for 3-5 seconds. Use resistance band.  10 reps per set, 1-2 sets per day, 7 days per week.

## 2022-09-29 NOTE — PROGRESS NOTES
Subjective:   Chief complaint: Right ankle and left heel pain  Referring provider: Ger Herndon     HPI:   Sanjana Queen is a 42 y.o. female who presents today for evaluation of right ankle and left heel pain.  Rates pain as 5/10.  Pain has been ongoing for years in ankle and a few months for left heel.  Inciting event: none known.  Treatments tried: exercises, night splint.    Regarding left, long history of heel pain.  Localized to PFO- tried all the things- night splint, bands, rolling, cushions without relief.  Never had injection.  Regarding right, long history of multiple sprains and continued giving way of ankle.  Localized to medial joint line.  No prior treatment.    Does the patient use tobacco products? No  If so, what and how often? N/A    ROS:  Musculoskeletal: per HPI  Neurological: Positive for burning, tingling and numbness  Heme: Negative for blood thinners; Negative for history of blood clot  Endocrine: Positive for diabetes    Objective:   Exam:  There were no vitals filed for this visit.  General: No acute distress, well-appearing  Neurologic: Alert and oriented x3  Psychiatric: Appropriate mood and affect, cooperative  Cardiovascular: Regular pulse  Respiratory: Breathing on room air  Skin: No rashes or ulcers  Vascular: Palpable DP/PT  Musculoskeletal: Standing examination demonstrates neutral alignment.  There is no swelling or ecchymosis.  Medial longitudinal arch is slightly pronated and and symmetric.      Focused exam of the Left lower extremity demonstrates normal ankle range of motion.  Hindfoot is flexible.  Ankle dorsiflexion is within normal limits.  TTP over the medial plantar fascia origin and central calcaneal fat pad.  NT over the abductor origin with negative Tinels. Calcaneal squeeze is negative.  Stretch of the plantar fascia does not cause pain but it TIGHT    Fires TA/GSC/PTT/peroneals.  SILT SP/DP/PT and able to localize.    Focused exam of the right lower  extremity demonstrates normal ankle range of motion.  Hindfoot is flexible.  Ankle dorsiflexion is within normal limits.  TTP over deltoid.  Ankle stable to drawer/tilt/ER stress.  TTP over the medial plantar fascia origin and central calcaneal fat pad.  NT over the abductor origin with negative Tinels. Calcaneal squeeze is negative.  Stretch of the plantar fascia does not cause pain and less tight than left.    Imaging:  Radiographs were ordered and independently interpreted by me.    Standing 3v right ankle demonstrates neutral ankle with ossicles in the medial gutter.    Standing 3v left foot demonstrates neutral Meary with no acute osseous abnormalities.    Additional records/labs reviewed:  None      Assessment:     1. Plantar fasciitis, left    2. Ankle instability, right         Patient is seen for multiple problems (not at treatment goal) with uncertain prognosis.    Data:  2 results independently interpreted    Treatment plan: Counseling regarding non-operative treatment and possibility of surgery in future if persistent morbidity from symptoms     For plantar fasciitis I reviewed imaging, clinical history, and diagnosis as above with the patient. I attempted to use layman's terms to educate the patient as well as utilize foot models and/or pictures.   I personally went through imaging with the patient.  I emphasized the role for non-operative treatment.  Non-operative treatment discussed with patient include: Anti-inflammatory medications or creams, RICE modalities, Stretching program, and CSI.      Discussed that plantar fasciitis is a difficult and challenging condition both for patients and physicians alike.  Reviewed the tendency for prolonged courses prior to symptom resolution.  Discussed that MRI and/or injections are not considered in my treatment algorithm until at least 3 months of refractory symptoms despite appropriate treatment.    Surgery for plantar fasciitis truly is considered as a last  resort given the lack of consensus about best treatment options.  Discussed ECSWT but that insurance doesn't cover it.     For ankle instability I reviewed imaging, clinical history, and diagnosis as above with the patient. I attempted to use layman's terms to educate the patient as well as utilize foot models and/or pictures.   I personally went through imaging with the patient.      I suspect this is sequela of multi-directional instability.  If refractory symptoms would get CSI.      Plan:       1.  Therapy: HEP demonstrated and taught to the patient today- PF and ankle stabilizers  2.  Symptomatic treatment: OTC NSAIDs + APAP recommended  3.  Restrictions: Advance activity as tolerated, use pain as guide  4.  Brace/orthotics/etc: Ankle ASO provided for use PRN  5.  Follow-up: 6 weeks with no x-rays needed     No orders of the defined types were placed in this encounter.      Past Medical History:   Diagnosis Date    Anemia     Asthma     Cardiomyopathy, peripartum     GERD (gastroesophageal reflux disease)     Hypertension     Hypothyroidism     not at present    Morbid obesity     Peripartum cardiomyopathy 7/28/2015    Snoring        Past Surgical History:   Procedure Laterality Date    BLADDER SURGERY      BREAST LUMPECTOMY Left 11/12/2021    DILATION AND CURETTAGE OF UTERUS      ENDOMETRIAL ABLATION      FRACTURE SURGERY      gastric sleeve N/A     HERNIA REPAIR  2/1/2020    Dr Luu    hernia repair 2/2020      spleen surgery      TONSILLECTOMY      TUBAL LIGATION         Family History   Problem Relation Age of Onset    Diabetes Mother     Arthritis Mother         Ankles    Hypertension Mother     Diabetes Father     Hypertension Father     Heart disease Father     Early death Father         65 years of age heart Failure    Hypertension Maternal Grandmother     Cancer Maternal Grandmother     Hypertension Maternal Grandfather     Heart disease Maternal Grandfather     Hypertension Sister     Diabetes  Sister     Cancer Paternal Aunt         Breast Cancer       Social History     Socioeconomic History    Marital status:    Tobacco Use    Smoking status: Former     Packs/day: 1.00     Years: 15.00     Pack years: 15.00     Types: Cigarettes     Start date: 3/1/1998     Quit date: 2018     Years since quittin.0    Smokeless tobacco: Never   Substance and Sexual Activity    Alcohol use: No    Drug use: No    Sexual activity: Yes     Partners: Male     Birth control/protection: See Surgical Hx     Comment: Uterine Ablation and Tubes removed     Social Determinants of Health     Financial Resource Strain: Low Risk     Difficulty of Paying Living Expenses: Not very hard   Food Insecurity: No Food Insecurity    Worried About Running Out of Food in the Last Year: Never true    Ran Out of Food in the Last Year: Never true   Transportation Needs: No Transportation Needs    Lack of Transportation (Medical): No    Lack of Transportation (Non-Medical): No   Physical Activity: Insufficiently Active    Days of Exercise per Week: 2 days    Minutes of Exercise per Session: 10 min   Stress: Stress Concern Present    Feeling of Stress : Rather much   Social Connections: Unknown    Frequency of Communication with Friends and Family: More than three times a week    Frequency of Social Gatherings with Friends and Family: More than three times a week    Active Member of Clubs or Organizations: No    Attends Club or Organization Meetings: Never    Marital Status:

## 2022-10-10 ENCOUNTER — PATIENT MESSAGE (OUTPATIENT)
Dept: ORTHOPEDICS | Facility: CLINIC | Age: 42
End: 2022-10-10
Payer: COMMERCIAL

## 2022-10-19 ENCOUNTER — CLINICAL SUPPORT (OUTPATIENT)
Dept: REHABILITATION | Facility: HOSPITAL | Age: 42
End: 2022-10-19
Attending: INTERNAL MEDICINE
Payer: COMMERCIAL

## 2022-10-19 DIAGNOSIS — N39.3 STRESS INCONTINENCE: ICD-10-CM

## 2022-10-19 DIAGNOSIS — N81.89 PELVIC FLOOR WEAKNESS: ICD-10-CM

## 2022-10-19 DIAGNOSIS — M62.89 PELVIC FLOOR DYSFUNCTION: ICD-10-CM

## 2022-10-19 PROCEDURE — 97530 THERAPEUTIC ACTIVITIES: CPT | Mod: PO

## 2022-10-19 PROCEDURE — 97110 THERAPEUTIC EXERCISES: CPT | Mod: PO

## 2022-10-19 NOTE — PROGRESS NOTES
Pelvic Health Physical Therapy   Treatment Note     Name: Sanjana Spencer [facility]  Clinic Number: [id number]    Therapy Diagnosis: No diagnosis found.  Physician: Philomena Wallis NP    Visit Date: 10/19/2022    Physician Orders: Pelvic PT Eval and Treat  Medical Diagnosis from Referral: Stress incontinence [N39.3]  Evaluation Date: 9/27/2022  Authorization Period Expiration: TBD  Plan of Care Expiration: 12/20/2022  Plan of Care Certification Period: 12/31/2022  Visit #/Visits authorized: 1/ 29   FOTO: 1/3 (9/27/22)  Cancelled Visits: 0  No Show Visits: 0    Time In: 1:04 pm  Time Out: 1:57 pm  Total Billable Time: 53 minutes    Precautions: Standard    Subjective     Pt reports: she was sick this past 2 weeks, had a lot of KARSON while coughing and sneezing. She was also taking steroids, completed those on Monday. She is still taking antibiotics and inhalers. REports that last week was the first time in a while that she sneezed without leaking. She is concerned about leaking at night, notices some wetness in the morning consistently.     She was compliant with home exercise program.  Response to previous treatment: ongoing  Functional change: ongoing    Pain: 0/10  Location: Patient denies any pain today    Constitutional Symptoms Review: The patient denies having any constitutional symptoms.     Objective   Pt verbally consents to intravaginal treatment today.  Signed consent form already on file.       Sanjana received therapeutic exercises to develop  strength, endurance, ROM, and core stabilization for 15 minutes including: See table below    Sanjana received the following manual therapy techniques: to develop flexibility, extensibility, and desensitization for 0 minutes including:   See table below    Sanjana participated in neuromuscular re-education activities to develop Coordination, Control, Posture, Kinesthetic, and Balance for 0 minutes including:   See table below    Sanjana participated in dynamic  functional therapeutic activities to improve functional performance for 38  minutes, including:  See table below     Intervention 10/19/22   TherEx Intravaginal assessment of pelvic floor muscle contractions - 15 reps endurance holds x   TherAc Intravaginal assessment of coordination of breath with pelvic floor muscle contraction - 10 reps x   TherAc Education re: coordination of breath with pelvic floor muscle contractions x   TherAc Assigned bowel/bladder diary    TherAc Reviewed bowel/bladder diary x   TherAc Education re: restricting fluids before bed x   TherAc Education re:elevating legs before bed x   TherAc Education re: double voiding x   TherAc Education re: The Knack x   TherAc Education re: increasing voids - go every 2-4 hours while awake x   TherEx Pelvic floor muscle contractions - endurance holds, 6 sets of 5 with 3 sec hold x       Home Exercises Provided and Patient Education Provided     Education provided:   - bladder irritants, anatomy/physiology of pelvic floor, diaphragmatic breathing, kegels, and Coordination of kegels with functional activities such as cough, laugh, sneeze, lift, etc.   Discussed progression of plan of care with patient; educated pt in activity modification; reviewed HEP with pt. Pt demonstrated and verbalized understanding of all instruction and was provided with a handout of HEP (see Patient Instructions).    Written Home Exercises Provided: yes.  Exercises were reviewed and Sanjana was able to demonstrate them prior to the end of the session.  Sanjana demonstrated good  understanding of the education provided.     See EMR under Patient Instructions for exercises provided 10/19/2022.    Assessment   Sanjana tolerated treatment well, with good understanding of therapeutic exercises and interventions. With digital assessment, patient with marked weakness of the pelvic floor muscles, and some breath holding with contractions.    Sanjana Is progressing well towards her goals.  "  Pt prognosis is Fair.     Pt will continue to benefit from skilled outpatient physical therapy to address the deficits listed in the problem list box on initial evaluation, provide pt/family education and to maximize pt's level of independence in the home and community environment.     Pt's spiritual, cultural and educational needs considered and pt agreeable to plan of care and goals.     Anticipated barriers to physical therapy: none    Short Term Goals: 6 weeks  Goal Status   Pt to be edu pelvic muscle bracing and be able to consistently perform correctly and quickly to help decrease incontinence with cough/laugh/sneeze. New   Pt to perform "the knack" prior to coughing, laughing or sneezing to decrease risk of incontinence. New   Pt to demonstrate being able to correctly and consistently perform a kegel which is needed  to increase pelvic floor muscle coordination and strength needed for continence. New   Pt to report being able to transfer from sitting to standing without leakage of urine. New   Pt to voice understanding of the role that diet plays on urinary urgency.   New   Pt to demonstrate an improved score in the FOTO Urinary Problem survey  to at least 56 to demonstrate improving symptoms and self management of condition.   New      Long Term Goals: 12 weeks  Goal Status   Pt to be discharged with home plan for carry over after discharge.   New   Pt to report reduction of urinary incontinence by at least 50% with ADLs. New   Pt to report no longer feeling the need to urinate just in case when shopping or participating in social activities to demonstrate improving pelvic floor and bladder control. New   Pt to demonstrate an improved score in the FOTO Urinary Problem survey  to at least 63 to demonstrate improving symptoms and self management of condition.   New   Pt to increase pelvic floor strength to at least 3/5 to demonstrate improved strength needed for continence with ADLs.  New       Plan "     Continue per established POC.     Catherine Brunner, PT , DPT

## 2022-10-19 NOTE — PATIENT INSTRUCTIONS
"Home Exercise Program: 10/19/2022    Kegels    Endurance Holds  Perform a long kegel (contract and LIFT the pelvic floor muscles as if you're trying to stop the stream of urine and passage of gas).    Make sure you're just using the internal muscles without holding your breath.  Let go and relax everything for 10 seconds.   Hold 3 seconds. Repeat 5 times, 6 sets per day.      **Coordinate kegels with your breath: Inhale, Relax. Exhale, squeeze your pelvic floor muscles.    Strategies to decrease bathroom trips at night ---  Restrict fluids 2 hours before bed.  Elevate your legs for 30 min prior to bed time. The legs should be higher than your heart. After 30 min, get up and use the restroom (use double voiding technique below). The idea behind this strategy is using gravity to bring blood back towards your kidneys to try to get your body to make more urine before you go to sleep, so you do not have to get up as often during the night to void.     DOUBLE VOIDING - Double voiding is a technique that may assist the bladder to empty more effectively when urine is left in the bladder. It involves passing urine more than once each time that you go to the toilet. This makes sure that the bladder is completely empty.    Here are 3 strategies you can try to fully empty your bladder.  You do not have to do all of these things every single time. Find which ones work best for you.     Check to make sure your pelvic floor is relaxed   Do a body scan - make sure your legs, buttocks, and abdominals are relaxed.  Take a couple deep, slow breaths to encourage your pelvic floor muscles to DROP (ie. Try Diaphragmatic Breathing).  Change your pelvic position: lean forward, rock your pelvis forward and backward, stand up then sit back down.   3. Gently apply pressure over your bladder.    Wait at least 30 seconds to 1 minute to see if a second urine stream begins.     Do not stop your urine stream or push/strain!    How to perform "the " "Knack"    This can be performed in any position. You may choose to perform in the shower the first few times in case of leakage. Firstly, try to relax your abdomen and pelvic floor. Next, without holding your breath, you will perform a Kegel (pelvic floor contraction) at about 25% strength (NOT a maximal contraction), then gently cough or clear your throat. You may do this up to 10 times, ensuring that you are fully relaxing your muscles between each one. You may perform this 1-2 times per day.    Once you feel comfortable with this exercise, begin incorporating this technique into your daily routine. For example, perform the knack when you feel you are about to sneeze to try to control instances of leakage.            "

## 2022-10-31 ENCOUNTER — CLINICAL SUPPORT (OUTPATIENT)
Dept: REHABILITATION | Facility: HOSPITAL | Age: 42
End: 2022-10-31
Payer: COMMERCIAL

## 2022-10-31 DIAGNOSIS — M62.89 PELVIC FLOOR DYSFUNCTION: ICD-10-CM

## 2022-10-31 DIAGNOSIS — N39.3 STRESS INCONTINENCE: Primary | ICD-10-CM

## 2022-10-31 DIAGNOSIS — N81.89 PELVIC FLOOR WEAKNESS: ICD-10-CM

## 2022-10-31 PROCEDURE — 97112 NEUROMUSCULAR REEDUCATION: CPT | Mod: PO

## 2022-10-31 PROCEDURE — 97110 THERAPEUTIC EXERCISES: CPT | Mod: PO

## 2022-10-31 PROCEDURE — 97530 THERAPEUTIC ACTIVITIES: CPT | Mod: PO

## 2022-10-31 NOTE — PROGRESS NOTES
Pelvic Health Physical Therapy   Treatment and Progress Note     Name: Sanjana Spencer [facility] Number: [id number]    Therapy Diagnosis:   Encounter Diagnoses   Name Primary?    Stress incontinence Yes    Pelvic floor dysfunction     Pelvic floor weakness      Physician: Philomena Wallis NP    Visit Date: 10/31/2022    Physician Orders: Pelvic PT Eval and Treat  Medical Diagnosis from Referral: Stress incontinence [N39.3]  Evaluation Date: 9/27/2022  Authorization Period Expiration: 12/31/2022  Plan of Care Expiration: 12/20/2022  Plan of Care Certification Period: 12/31/2022  Visit #/Visits authorized: 2/ 29   FOTO: 1/3 (9/27/22, 10/31/2022)  Cancelled Visits: 0  No Show Visits: 0    Time In: 9:00 am  Time Out: 9:55 am  Total Billable Time: 55 minutes    Precautions: Standard    Subjective     Pt reports: was very sick this past week with a stomach bug. She reports KARSON with throwing up that one day. She reports another sneezing fit recently, was able to utilize the knack without leaking. She had just gone to the bathroom prior to that. She reports she is waking up dry now that she is elevating her legs before bed time. However, she reports feeling damp throughout the day, even when she has not felt a leak.       She was compliant with home exercise program.  Response to previous treatment: ongoing  Functional change: ongoing    Pain: 0/10  Location: Patient denies any pain today    Constitutional Symptoms Review: The patient denies having any constitutional symptoms.     Objective   Pt verbally consents to intravaginal treatment today.  Signed consent form already on file.     Sanjana received therapeutic exercises to develop  strength, endurance, ROM, and core stabilization for 25 minutes including: See table below    Sanjana received the following manual therapy techniques: to develop flexibility, extensibility, and desensitization for 0 minutes including:   See table below    Sanjana participated in  neuromuscular re-education activities to develop Coordination, Control, Posture, Kinesthetic, and Balance for 15 minutes including:   See table below    Sanjana participated in dynamic functional therapeutic activities to improve functional performance for 15  minutes, including:  See table below     Intervention 10/19/22 10/31/22   Neuro Re-Ed Intravaginal Assessment - See Results below  x   TherAc Reviewed HEP and continued plan of care     TherEx Intravaginal assessment of pelvic floor muscle contractions - 15 reps endurance holds x    TherAc Intravaginal assessment of coordination of breath with pelvic floor muscle contraction - 10 reps x    TherAc Education re: coordination of breath with pelvic floor muscle contractions x x   TherAc Coordination of pelvic floor muscle contraction with breath - Inhale, Relax. Exhale, Squeeze - 3 x 5  x   TherAc Education re: Kegel weights for incorporation into PoC  x   TherAc Assigned bowel/bladder diary     TherAc Reviewed bowel/bladder diary x    TherAc Education re: restricting fluids before bed x    TherAc Education re:elevating legs before bed x x   TherAc Education re: double voiding x x   TherAc Education re: The Knack x x   TherAc Education re: increasing voids - go every 2-4 hours while awake x    TherEx Pelvic floor muscle contractions - endurance holds, 6 sets of 5 with 3 sec hold x    TherEx Supine Adduction Ball Squeezes - 3 x 10 with 3 sec hold  x   TherEx Side lying hip abduction - 3 x 10 bilateral      TherEx Side lying clam shells - 3 x 10 bilateral   x   TherEx Standing hip extension - Yellow TB - 3 x 10 bilateral   x   TherEx Standing hip abduction - Yellow TB - 3 x 10 bilateral   x   TherEx Standing hip adduction- Yellow TB - 3 x 10 bilateral        10/31/22 Re-Evaluation    VAGINAL PELVIC FLOOR EXAM    EXTERNAL ASSESSMENT  Introitus: WNL  Skin condition: WNL  Scarring: none   Pain:  none  Voluntary contraction: visible lift  Voluntary relaxation: visible  drop  Involuntary contraction: nil  Bearing down: nil      INTERNAL ASSESSMENT   Pain: none   Sensation: able to localized pressure appropriately   Vaginal vault: WNL   Muscle Bulk: WFL   Muscle Power: 1/5  Muscle Endurance: 3 sec  # Reps To Fatigue: 4    Fast Contractions in 10 seconds: 6     Quality of contraction: decreased hold   Specificity: WNL   Coordination: tends to hold breath during PFM contration     From 9/27 Eval:   VAGINAL PELVIC FLOOR EXAM  EXTERNAL ASSESSMENT  Introitus: WNL  Skin condition: WNL  Scarring: none   Sensation: WNL   Pain:  none  Voluntary contraction: nil  Voluntary relaxation: nil  Involuntary contraction: bulge  Bearing down: bulge  Perineal descent: absent                            INTERNAL ASSESSMENT  Pain: none   Sensation: able to localized pressure appropriately   Vaginal vault: WNL   Muscle Bulk: WFL   Muscle Power: 1/5  Muscle Endurance: 3 sec  # Reps To Fatigue: 2    Fast Contractions in 10 seconds: 6                           Quality of contraction: slow rise, decreased hold, and slow relaxation   Specificity: WNL   Coordination: WNL   Prolapse check:not assessed today     Home Exercises Provided and Patient Education Provided     Education provided:   - bladder irritants, anatomy/physiology of pelvic floor, diaphragmatic breathing, kegels, and Coordination of kegels with functional activities such as cough, laugh, sneeze, lift, etc.   Discussed progression of plan of care with patient; educated pt in activity modification; reviewed HEP with pt. Pt demonstrated and verbalized understanding of all instruction and was provided with a handout of HEP (see Patient Instructions).    Written Home Exercises Provided: yes.  Exercises were reviewed and Sanjana was able to demonstrate them prior to the end of the session.  Sanjana demonstrated good  understanding of the education provided.     See EMR under Patient Instructions for exercises provided 10/19/2022.    Assessment  "  Sanjana tolerated treatment well, able to incorporate newly added therapeutic exercises into her plan of care. With internal assessment, patient with weak pelvic floor muscle contractions, and inability to sustain contractions. Patient also struggles to sense if she is holding a contraction. PT recommended incorporation of kegel weights into her plan of care.      Sanjana Is progressing well towards her goals.   Pt prognosis is Fair.     Pt will continue to benefit from skilled outpatient physical therapy to address the deficits listed in the problem list box on initial evaluation, provide pt/family education and to maximize pt's level of independence in the home and community environment.     Pt's spiritual, cultural and educational needs considered and pt agreeable to plan of care and goals.     Anticipated barriers to physical therapy: none    Short Term Goals: 6 weeks  Goal Status   Pt to be edu pelvic muscle bracing and be able to consistently perform correctly and quickly to help decrease incontinence with cough/laugh/sneeze. In Progress   Pt to perform "the knack" prior to coughing, laughing or sneezing to decrease risk of incontinence. In Progress   Pt to demonstrate being able to correctly and consistently perform a kegel which is needed  to increase pelvic floor muscle coordination and strength needed for continence. In Progress   Pt to report being able to transfer from sitting to standing without leakage of urine. In Progress   Pt to voice understanding of the role that diet plays on urinary urgency.   Met   Pt to demonstrate an improved score in the FOTO Urinary Problem survey  to at least 56 to demonstrate improving symptoms and self management of condition.   In Progress      Long Term Goals: 12 weeks  Goal Status   Pt to be discharged with home plan for carry over after discharge.   In Progress   Pt to report reduction of urinary incontinence by at least 50% with ADLs. In Progress   Pt to report no " longer feeling the need to urinate just in case when shopping or participating in social activities to demonstrate improving pelvic floor and bladder control. In Progress   Pt to demonstrate an improved score in the FOTO Urinary Problem survey  to at least 63 to demonstrate improving symptoms and self management of condition.   In Progress   Pt to increase pelvic floor strength to at least 3/5 to demonstrate improved strength needed for continence with ADLs.  In Progress       Plan     Continue per established POC. Pt to be transferred to another PFPT once current PT goes out on leave.    Catherine Brunner, PT , DPT

## 2022-10-31 NOTE — PATIENT INSTRUCTIONS
Home Exercise Program: 10/31/2022    Kegels    Endurance Holds  Perform a long kegel (contract and LIFT the pelvic floor muscles as if you're trying to stop the stream of urine and passage of gas).    Make sure you're just using the internal muscles without holding your breath.  Let go and relax everything for 10 seconds.   Hold 3 seconds. Repeat 5 times, 6 sets per day.      Pelvic floor muscle contractions with coordinated breath - 15 reps  While lying down, do 3 sets of 5 pelvic floor muscle contractions (kegels) with coordinated breath. The steps are:  Inhale, relax your pelvic floor.  Exhale, squeeze pelvic floor muscles up and in.  REPEAT.      Strategies to decrease bathroom trips at night ---  Restrict fluids 2 hours before bed.  Elevate your legs for 30 min prior to bed time. The legs should be higher than your heart. After 30 min, get up and use the restroom (use double voiding technique below). The idea behind this strategy is using gravity to bring blood back towards your kidneys to try to get your body to make more urine before you go to sleep, so you do not have to get up as often during the night to void.     DOUBLE VOIDING - Double voiding is a technique that may assist the bladder to empty more effectively when urine is left in the bladder. It involves passing urine more than once each time that you go to the toilet. This makes sure that the bladder is completely empty.    Here are 3 strategies you can try to fully empty your bladder.  You do not have to do all of these things every single time. Find which ones work best for you.     Check to make sure your pelvic floor is relaxed   Do a body scan - make sure your legs, buttocks, and abdominals are relaxed.  Take a couple deep, slow breaths to encourage your pelvic floor muscles to DROP (ie. Try Diaphragmatic Breathing).  Change your pelvic position: lean forward, rock your pelvis forward and backward, stand up then sit back down.   3. Gently apply  "pressure over your bladder.    Wait at least 30 seconds to 1 minute to see if a second urine stream begins.     Do not stop your urine stream or push/strain!    How to perform "the Knack"    This can be performed in any position. You may choose to perform in the shower the first few times in case of leakage. Firstly, try to relax your abdomen and pelvic floor. Next, without holding your breath, you will perform a Kegel (pelvic floor contraction) at about 25% strength (NOT a maximal contraction), then gently cough or clear your throat. You may do this up to 10 times, ensuring that you are fully relaxing your muscles between each one. You may perform this 1-2 times per day.    Once you feel comfortable with this exercise, begin incorporating this technique into your daily routine. For example, perform the knack when you feel you are about to sneeze to try to control instances of leakage.               "

## 2022-11-06 ENCOUNTER — PATIENT MESSAGE (OUTPATIENT)
Dept: REHABILITATION | Facility: HOSPITAL | Age: 42
End: 2022-11-06
Payer: COMMERCIAL

## 2022-11-11 ENCOUNTER — LAB VISIT (OUTPATIENT)
Dept: LAB | Facility: HOSPITAL | Age: 42
End: 2022-11-11
Attending: STUDENT IN AN ORGANIZED HEALTH CARE EDUCATION/TRAINING PROGRAM
Payer: COMMERCIAL

## 2022-11-11 ENCOUNTER — OFFICE VISIT (OUTPATIENT)
Dept: OBSTETRICS AND GYNECOLOGY | Facility: CLINIC | Age: 42
End: 2022-11-11
Payer: COMMERCIAL

## 2022-11-11 VITALS
DIASTOLIC BLOOD PRESSURE: 88 MMHG | BODY MASS INDEX: 49.38 KG/M2 | SYSTOLIC BLOOD PRESSURE: 120 MMHG | HEIGHT: 63 IN | WEIGHT: 278.69 LBS

## 2022-11-11 DIAGNOSIS — Z01.419 ENCOUNTER FOR GYNECOLOGICAL EXAMINATION: ICD-10-CM

## 2022-11-11 DIAGNOSIS — Z12.31 BREAST CANCER SCREENING BY MAMMOGRAM: ICD-10-CM

## 2022-11-11 DIAGNOSIS — Z12.4 CERVICAL CANCER SCREENING: ICD-10-CM

## 2022-11-11 DIAGNOSIS — L02.92 BOIL: ICD-10-CM

## 2022-11-11 DIAGNOSIS — Z01.419 ENCOUNTER FOR GYNECOLOGICAL EXAMINATION: Primary | ICD-10-CM

## 2022-11-11 DIAGNOSIS — Z11.51 ENCOUNTER FOR SCREENING FOR HUMAN PAPILLOMAVIRUS (HPV): ICD-10-CM

## 2022-11-11 PROCEDURE — 99999 PR PBB SHADOW E&M-EST. PATIENT-LVL IV: ICD-10-PCS | Mod: PBBFAC,,, | Performed by: STUDENT IN AN ORGANIZED HEALTH CARE EDUCATION/TRAINING PROGRAM

## 2022-11-11 PROCEDURE — 3072F PR LOW RISK FOR RETINOPATHY: ICD-10-PCS | Mod: CPTII,S$GLB,, | Performed by: STUDENT IN AN ORGANIZED HEALTH CARE EDUCATION/TRAINING PROGRAM

## 2022-11-11 PROCEDURE — 82670 ASSAY OF TOTAL ESTRADIOL: CPT | Performed by: STUDENT IN AN ORGANIZED HEALTH CARE EDUCATION/TRAINING PROGRAM

## 2022-11-11 PROCEDURE — 99396 PREV VISIT EST AGE 40-64: CPT | Mod: S$GLB,,, | Performed by: STUDENT IN AN ORGANIZED HEALTH CARE EDUCATION/TRAINING PROGRAM

## 2022-11-11 PROCEDURE — 88175 CYTOPATH C/V AUTO FLUID REDO: CPT | Performed by: STUDENT IN AN ORGANIZED HEALTH CARE EDUCATION/TRAINING PROGRAM

## 2022-11-11 PROCEDURE — 99999 PR PBB SHADOW E&M-EST. PATIENT-LVL IV: CPT | Mod: PBBFAC,,, | Performed by: STUDENT IN AN ORGANIZED HEALTH CARE EDUCATION/TRAINING PROGRAM

## 2022-11-11 PROCEDURE — 87624 HPV HI-RISK TYP POOLED RSLT: CPT | Performed by: STUDENT IN AN ORGANIZED HEALTH CARE EDUCATION/TRAINING PROGRAM

## 2022-11-11 PROCEDURE — 3079F DIAST BP 80-89 MM HG: CPT | Mod: CPTII,S$GLB,, | Performed by: STUDENT IN AN ORGANIZED HEALTH CARE EDUCATION/TRAINING PROGRAM

## 2022-11-11 PROCEDURE — 1159F MED LIST DOCD IN RCRD: CPT | Mod: CPTII,S$GLB,, | Performed by: STUDENT IN AN ORGANIZED HEALTH CARE EDUCATION/TRAINING PROGRAM

## 2022-11-11 PROCEDURE — 83001 ASSAY OF GONADOTROPIN (FSH): CPT | Performed by: STUDENT IN AN ORGANIZED HEALTH CARE EDUCATION/TRAINING PROGRAM

## 2022-11-11 PROCEDURE — 3074F SYST BP LT 130 MM HG: CPT | Mod: CPTII,S$GLB,, | Performed by: STUDENT IN AN ORGANIZED HEALTH CARE EDUCATION/TRAINING PROGRAM

## 2022-11-11 PROCEDURE — 3079F PR MOST RECENT DIASTOLIC BLOOD PRESSURE 80-89 MM HG: ICD-10-PCS | Mod: CPTII,S$GLB,, | Performed by: STUDENT IN AN ORGANIZED HEALTH CARE EDUCATION/TRAINING PROGRAM

## 2022-11-11 PROCEDURE — 3044F PR MOST RECENT HEMOGLOBIN A1C LEVEL <7.0%: ICD-10-PCS | Mod: CPTII,S$GLB,, | Performed by: STUDENT IN AN ORGANIZED HEALTH CARE EDUCATION/TRAINING PROGRAM

## 2022-11-11 PROCEDURE — 3008F PR BODY MASS INDEX (BMI) DOCUMENTED: ICD-10-PCS | Mod: CPTII,S$GLB,, | Performed by: STUDENT IN AN ORGANIZED HEALTH CARE EDUCATION/TRAINING PROGRAM

## 2022-11-11 PROCEDURE — 1160F PR REVIEW ALL MEDS BY PRESCRIBER/CLIN PHARMACIST DOCUMENTED: ICD-10-PCS | Mod: CPTII,S$GLB,, | Performed by: STUDENT IN AN ORGANIZED HEALTH CARE EDUCATION/TRAINING PROGRAM

## 2022-11-11 PROCEDURE — 3044F HG A1C LEVEL LT 7.0%: CPT | Mod: CPTII,S$GLB,, | Performed by: STUDENT IN AN ORGANIZED HEALTH CARE EDUCATION/TRAINING PROGRAM

## 2022-11-11 PROCEDURE — 4010F ACE/ARB THERAPY RXD/TAKEN: CPT | Mod: CPTII,S$GLB,, | Performed by: STUDENT IN AN ORGANIZED HEALTH CARE EDUCATION/TRAINING PROGRAM

## 2022-11-11 PROCEDURE — 3074F PR MOST RECENT SYSTOLIC BLOOD PRESSURE < 130 MM HG: ICD-10-PCS | Mod: CPTII,S$GLB,, | Performed by: STUDENT IN AN ORGANIZED HEALTH CARE EDUCATION/TRAINING PROGRAM

## 2022-11-11 PROCEDURE — 1159F PR MEDICATION LIST DOCUMENTED IN MEDICAL RECORD: ICD-10-PCS | Mod: CPTII,S$GLB,, | Performed by: STUDENT IN AN ORGANIZED HEALTH CARE EDUCATION/TRAINING PROGRAM

## 2022-11-11 PROCEDURE — 4010F PR ACE/ARB THEARPY RXD/TAKEN: ICD-10-PCS | Mod: CPTII,S$GLB,, | Performed by: STUDENT IN AN ORGANIZED HEALTH CARE EDUCATION/TRAINING PROGRAM

## 2022-11-11 PROCEDURE — 3008F BODY MASS INDEX DOCD: CPT | Mod: CPTII,S$GLB,, | Performed by: STUDENT IN AN ORGANIZED HEALTH CARE EDUCATION/TRAINING PROGRAM

## 2022-11-11 PROCEDURE — 3072F LOW RISK FOR RETINOPATHY: CPT | Mod: CPTII,S$GLB,, | Performed by: STUDENT IN AN ORGANIZED HEALTH CARE EDUCATION/TRAINING PROGRAM

## 2022-11-11 PROCEDURE — 1160F RVW MEDS BY RX/DR IN RCRD: CPT | Mod: CPTII,S$GLB,, | Performed by: STUDENT IN AN ORGANIZED HEALTH CARE EDUCATION/TRAINING PROGRAM

## 2022-11-11 PROCEDURE — 84146 ASSAY OF PROLACTIN: CPT | Performed by: STUDENT IN AN ORGANIZED HEALTH CARE EDUCATION/TRAINING PROGRAM

## 2022-11-11 PROCEDURE — 99396 PR PREVENTIVE VISIT,EST,40-64: ICD-10-PCS | Mod: S$GLB,,, | Performed by: STUDENT IN AN ORGANIZED HEALTH CARE EDUCATION/TRAINING PROGRAM

## 2022-11-11 RX ORDER — MUPIROCIN CALCIUM 20 MG/G
CREAM TOPICAL 2 TIMES DAILY PRN
Qty: 30 G | Refills: 1 | Status: SHIPPED | OUTPATIENT
Start: 2022-11-11 | End: 2023-01-13 | Stop reason: ALTCHOICE

## 2022-11-11 NOTE — PROGRESS NOTES
Chief Complaint: Well Woman Exam     HPI:      Sanjana Queen is a 42 y.o. F with T2DM, HTN and PCOS, who presents today for well woman exam.  Last cycle in October. Oligomenorrhea - history of ablation. On Prometrium nightly. On ozempic - doing well. PT for [facility]. Complaining of recurring small boil on left breast. No other issues, problems, or complaints. Specifically, patient denies abnormal vaginal bleeding, discharge, pelvic pain, urinary problems, or changes in appetite. Ms. Queen is currently sexually active with a single male partner. She is currently using no method for contraception. She declines STD screening today.    Previous Pap: NILM, HPV negative ()  Previous Mammogram: BiRads: 2 ()  Most Recent Colonoscopy: Benign polyps every 5 yrs per GI - will be due next yr    Past Medical History:   Diagnosis Date    Anemia     Asthma     Cardiomyopathy, peripartum     GERD (gastroesophageal reflux disease)     Hypertension     Hypothyroidism     not at present    Morbid obesity     Peripartum cardiomyopathy 2015    Snoring      Past Surgical History:   Procedure Laterality Date    BLADDER SURGERY      BREAST LUMPECTOMY Left 2021    DILATION AND CURETTAGE OF UTERUS      ENDOMETRIAL ABLATION      FRACTURE SURGERY      gastric sleeve N/A     HERNIA REPAIR  2020    Dr Luu    hernia repair 2020      spleen surgery      TONSILLECTOMY      TUBAL LIGATION       Review of patient's allergies indicates:   Allergen Reactions    Doxycycline Anaphylaxis and Other (See Comments)     Social History     Socioeconomic History    Marital status:    Tobacco Use    Smoking status: Former     Packs/day: 1.00     Years: 15.00     Pack years: 15.00     Types: Cigarettes     Start date: 3/1/1998     Quit date: 2018     Years since quittin.1    Smokeless tobacco: Never   Substance and Sexual Activity    Alcohol use: No    Drug use: No    Sexual activity: Yes     Partners: Male      "Birth control/protection: See Surgical Hx     Comment: Uterine Ablation and Tubes removed     Cancer-related family history includes Cancer in her maternal grandmother and paternal aunt.      OB History    No obstetric history on file.         Physical Exam:      PHYSICAL EXAM:  /88 (BP Location: Left arm, Patient Position: Sitting, BP Method: Large (Manual))   Ht 5' 3" (1.6 m)   Wt 126.4 kg (278 lb 10.6 oz)   LMP  (LMP Unknown)   BMI 49.36 kg/m²   Body mass index is 49.36 kg/m².     APPEARANCE: Well nourished, well developed, in no acute distress.  PSYCH: Appropriate mood and affect.  SKIN: No acne or hirsutism  NECK: Neck symmetric without masses  NODES: No inguinal, axillary, lymph node enlargement  ABDOMEN: Soft.  No tenderness or masses.    CARDIOVASCULAR: No edema of peripheral extremities  BREASTS: Symmetrical, no visible skin lesions, resolving small boil on left breast just lateral to nipple. No palpable masses. No nipple discharge bilaterally.  PELVIC: Normal external genitalia without lesions.  Normal hair distribution.  Adequate perineal body, normal urethral meatus.  Vagina moist and well rugated. Without lesions. Without discharge.  Cervix pink, without lesions, discharge or tenderness.  No significant cystocele or rectocele.  Bimanual exam shows uterus to be normal size, regular, mobile and nontender.  Adnexa without masses or tenderness.      Assessment/Plan:     Encounter for gynecological examination  -     FOLLICLE STIMULATING HORMONE; Future; Expected date: 11/11/2022  -     PROLACTIN; Future; Expected date: 11/11/2022  -     Estradiol; Future; Expected date: 11/11/2022    - pelvic exam normal today  - pap/hpv colleted and UTD  - MMG ordered  - colonoscopy due next yr (gets them q5 yrs due to GI issues)  - oligomenorrhea labs today   - continue Prometrium  - routine labs and other comorbidities managed by PCP   - pelvic floor PT for [facility]    Cervical cancer screening  -     Liquid-Based " Pap Smear, Screening    Encounter for screening for human papillomavirus (HPV)  -     HPV High Risk Genotypes, PCR    Breast cancer screening by mammogram  -     Mammo Digital Screening Bilat w/ [facility]; Future; Expected date: 11/11/2022      Follow up in about 1 year (around 11/11/2023) for annual exam.    Counseling:     Patient was counseled today on current ASCCP pap guidelines, the recommendation for yearly physical exams, safe driving habits, breast self awareness and annual mammograms. She is to see her PCP for other health maintenance.     Use of the [facility] Patient Portal discussed and encouraged during today's visit.     Lyric Roland MD  Obstetrics and Gynecology  [facility]

## 2022-11-12 LAB
ESTRADIOL SERPL-MCNC: 36 PG/ML
FSH SERPL-ACNC: 10.23 MIU/ML
PROLACTIN SERPL IA-MCNC: 7.3 NG/ML (ref 5.2–26.5)

## 2022-11-21 ENCOUNTER — CLINICAL SUPPORT (OUTPATIENT)
Dept: REHABILITATION | Facility: HOSPITAL | Age: 42
End: 2022-11-21
Attending: INTERNAL MEDICINE
Payer: COMMERCIAL

## 2022-11-21 DIAGNOSIS — N81.89 PELVIC FLOOR WEAKNESS: ICD-10-CM

## 2022-11-21 DIAGNOSIS — M62.89 PELVIC FLOOR DYSFUNCTION: ICD-10-CM

## 2022-11-21 DIAGNOSIS — N39.3 STRESS INCONTINENCE: ICD-10-CM

## 2022-11-21 PROCEDURE — 97530 THERAPEUTIC ACTIVITIES: CPT | Mod: PO

## 2022-11-21 PROCEDURE — 97110 THERAPEUTIC EXERCISES: CPT | Mod: PO

## 2022-11-21 NOTE — PROGRESS NOTES
Pelvic Health Physical Therapy   Treatment Note     Name: Sanjana Spencer [facility] Number: [id number]    Therapy Diagnosis:   Encounter Diagnoses   Name Primary?    Stress incontinence     Pelvic floor dysfunction     Pelvic floor weakness        Physician: Philomena Wallis NP    Visit Date: 11/21/2022    Physician Orders: Pelvic PT Eval and Treat  Medical Diagnosis from Referral: Stress incontinence [N39.3]  Evaluation Date: 9/27/2022  Authorization Period Expiration: 12/31/2022  Plan of Care Expiration: 12/20/2022  Plan of Care Certification Period: 12/31/2022  Visit #/Visits authorized: 3/ 29   FOTO: 1/3 (9/27/22, 10/31/2022, 11/21/22)  Cancelled Visits: 0  No Show Visits: 0    Time In: 2:50 pm  Time Out: 3:49 pm  Total Billable Time: 59 minutes    Precautions: Standard    Subjective     Pt reports: is having sinus symptoms right now, which has exacerbated her leakage because of more frequent sneezing/coughing. She did the HEP the week after our last visit, but has not done them since (last 2 weeks). She reports less leaking at night, but is still having some leakage that she is not feeling. She reports a large leak yesterday with lifting a heavy box, even though she had just gone to the bathroom. She brought the intimate stacia kegel weights with her today, and is amenable to incorporating them into her plan of care.       She was partially compliant with home exercise program.  Response to previous treatment: ongoing  Functional change: ongoing    Pain: 0/10  Location: Patient denies any pain today    Constitutional Symptoms Review: The patient denies having any constitutional symptoms.     Objective   Pt verbally consents to intravaginal treatment today.  Signed consent form already on file.     Sanjana received therapeutic exercises to develop  strength, endurance, ROM, and core stabilization for 30 minutes including: See table below    Sanjana received the following manual therapy techniques: to develop  flexibility, extensibility, and desensitization for 0 minutes including:   See table below    Sanjana participated in neuromuscular re-education activities to develop Coordination, Control, Posture, Kinesthetic, and Balance for 0 minutes including:   See table below    Sanjana participated in dynamic functional therapeutic activities to improve functional performance for 29 minutes, including:  See table below     Intervention 10/19/22 10/31/22 ReEval 11/21/22   Neuro Re-Ed Intravaginal Assessment - See Results below  x    TherAc Reviewed HEP and continued plan of care      TherEx Intravaginal assessment of pelvic floor muscle contractions - 15 reps endurance holds x     TherAc Intravaginal assessment of coordination of breath with pelvic floor muscle contraction - 10 reps x     TherAc Education re: coordination of breath with pelvic floor muscle contractions x x    TherAc Coordination of pelvic floor muscle contraction with breath - Inhale, Relax. Exhale, Squeeze - 3 x 5  x    TherAc Intimate Екатерина Kegel Weights - White weight size 1  With weight in:  Standing marches  Sit to stand - 3 sets of 5   x   TherAc Education re: Kegel weights for incorporation into PoC, care and use of  x x   TherAc Assigned bowel/bladder diary      TherAc Reviewed bowel/bladder diary x     TherAc Education re: restricting fluids before bed x  x   TherAc Education re:elevating legs before bed x x x   TherAc Education re: double voiding x x x   TherAc Education re: The Knack x x x   TherAc Education re: increasing voids - go every 2-4 hours while awake x     TherEx Pelvic floor muscle contractions - endurance holds, 6 sets of 5 with 3 sec hold x     TherEx Pelvic floor muscle contractions - endurance holds, 3 sets of 10 with 3 sec hold   x   TherEx Supine Adduction Ball Squeezes - 3 x 10 with 3 sec hold  x x   TherEx Side lying hip abduction - 3 x 10 bilateral    x   TherEx Side lying clam shells - 3 x 10 bilateral   x    TherEx Standing  hip extension - Yellow TB - 3 x 10 bilateral   x x   TherEx Standing hip abduction - Yellow TB - 3 x 10 bilateral   x    TherEx Standing hip adduction- Yellow TB - 3 x 10 bilateral         10/31/22 Re-Evaluation    VAGINAL PELVIC FLOOR EXAM    EXTERNAL ASSESSMENT  Introitus: WNL  Skin condition: WNL  Scarring: none   Pain:  none  Voluntary contraction: visible lift  Voluntary relaxation: visible drop  Involuntary contraction: nil  Bearing down: nil      INTERNAL ASSESSMENT   Pain: none   Sensation: able to localized pressure appropriately   Vaginal vault: WNL   Muscle Bulk: WFL   Muscle Power: 1/5  Muscle Endurance: 3 sec  # Reps To Fatigue: 4    Fast Contractions in 10 seconds: 6     Quality of contraction: decreased hold   Specificity: WNL   Coordination: tends to hold breath during PFM contration     From 9/27 Eval:   VAGINAL PELVIC FLOOR EXAM  EXTERNAL ASSESSMENT  Introitus: WNL  Skin condition: WNL  Scarring: none   Sensation: WNL   Pain:  none  Voluntary contraction: nil  Voluntary relaxation: nil  Involuntary contraction: bulge  Bearing down: bulge  Perineal descent: absent                            INTERNAL ASSESSMENT  Pain: none   Sensation: able to localized pressure appropriately   Vaginal vault: WNL   Muscle Bulk: WFL   Muscle Power: 1/5  Muscle Endurance: 3 sec  # Reps To Fatigue: 2    Fast Contractions in 10 seconds: 6                           Quality of contraction: slow rise, decreased hold, and slow relaxation   Specificity: WNL   Coordination: WNL   Prolapse check:not assessed today     Home Exercises Provided and Patient Education Provided     Education provided:   - bladder irritants, anatomy/physiology of pelvic floor, diaphragmatic breathing, kegels, and Coordination of kegels with functional activities such as cough, laugh, sneeze, lift, etc.   Discussed progression of plan of care with patient; educated pt in activity modification; reviewed HEP with pt. Pt demonstrated and verbalized  "understanding of all instruction and was provided with a handout of HEP (see Patient Instructions).    Written Home Exercises Provided: yes.  Exercises were reviewed and Sanjana was able to demonstrate them prior to the end of the session.  Sanjana demonstrated good  understanding of the education provided.     See EMR under Patient Instructions for exercises provided  11/21/2022 .    Assessment   Sanjana with good tolerance to treatment today. She was able to initiate kegel weight therapy, and successfully kept the white weight in place while doing light activity (standing marches, sit to stand). Patient demonstrated good understanding of kegel weight therapy for home exercise program. Patient has been educated regarding kegel weight use, hygiene and progression. Patient with good performance of therapeutic exercises and activities, with minimal verbal cues needed to avoid breath holding. Patient is appropriate for continued skilled pelvic floor PT services at this time.         Sanjana Is progressing well towards her goals.   Pt prognosis is Fair.     Pt will continue to benefit from skilled outpatient physical therapy to address the deficits listed in the problem list box on initial evaluation, provide pt/family education and to maximize pt's level of independence in the home and community environment.     Pt's spiritual, cultural and educational needs considered and pt agreeable to plan of care and goals.     Anticipated barriers to physical therapy: none    Short Term Goals: 6 weeks  Goal Status Notes   Pt to be edu pelvic muscle bracing and be able to consistently perform correctly and quickly to help decrease incontinence with cough/laugh/sneeze. In Progress    Pt to perform "the knack" prior to coughing, laughing or sneezing to decrease risk of incontinence. In Progress    Pt to demonstrate being able to correctly and consistently perform a kegel which is needed  to increase pelvic floor muscle coordination " and strength needed for continence. In Progress    Pt to report being able to transfer from sitting to standing without leakage of urine. In Progress    Pt to voice understanding of the role that diet plays on urinary urgency.   Met    Pt to demonstrate an improved score in the FOTO Urinary Problem survey  to at least 56 to demonstrate improving symptoms and self management of condition.   In Progress       Long Term Goals: 12 weeks  Goal Status Notes   Pt to be discharged with home plan for carry over after discharge.   In Progress    Pt to report reduction of urinary incontinence by at least 50% with ADLs. In Progress    Pt to report no longer feeling the need to urinate just in case when shopping or participating in social activities to demonstrate improving pelvic floor and bladder control. In Progress    Pt to demonstrate an improved score in the FOTO Urinary Problem survey  to at least 63 to demonstrate improving symptoms and self management of condition.   In Progress    Pt to increase pelvic floor strength to at least 3/5 to demonstrate improved strength needed for continence with ADLs.  In Progress        Plan     Continue per established POC.     Catherine Brunner, PT , DPT

## 2022-11-21 NOTE — PATIENT INSTRUCTIONS
Kegels    Endurance Holds  Perform a long kegel (contract and LIFT the pelvic floor muscles as if you're trying to stop the stream of urine and passage of gas).    Make sure you're just using the internal muscles without holding your breath.  Let go and relax everything for 10 seconds.   Hold 3 seconds. Repeat 10 times, 3 sets per day.      Pelvic floor muscle contractions with coordinated breath - 15 reps  While lying down, do 3 sets of 5 pelvic floor muscle contractions (kegels) with coordinated breath. The steps are:  Inhale, relax your pelvic floor.  Exhale, squeeze pelvic floor muscles up and in.  REPEAT.      Strategies to decrease bathroom trips at night ---  Restrict fluids 2 hours before bed.  Elevate your legs for 30 min prior to bed time. The legs should be higher than your heart. After 30 min, get up and use the restroom (use double voiding technique below). The idea behind this strategy is using gravity to bring blood back towards your kidneys to try to get your body to make more urine before you go to sleep, so you do not have to get up as often during the night to void.     DOUBLE VOIDING - Double voiding is a technique that may assist the bladder to empty more effectively when urine is left in the bladder. It involves passing urine more than once each time that you go to the toilet. This makes sure that the bladder is completely empty.    Here are 3 strategies you can try to fully empty your bladder.  You do not have to do all of these things every single time. Find which ones work best for you.     Check to make sure your pelvic floor is relaxed   Do a body scan - make sure your legs, buttocks, and abdominals are relaxed.  Take a couple deep, slow breaths to encourage your pelvic floor muscles to DROP (ie. Try Diaphragmatic Breathing).  Change your pelvic position: lean forward, rock your pelvis forward and backward, stand up then sit back down.   3. Gently apply pressure over your bladder.    Wait  "at least 30 seconds to 1 minute to see if a second urine stream begins.     Do not stop your urine stream or push/strain!    How to perform "the Knack"    This can be performed in any position. You may choose to perform in the shower the first few times in case of leakage. Firstly, try to relax your abdomen and pelvic floor. Next, without holding your breath, you will perform a Kegel (pelvic floor contraction) at about 25% strength (NOT a maximal contraction), then gently cough or clear your throat. You may do this up to 10 times, ensuring that you are fully relaxing your muscles between each one. You may perform this 1-2 times per day.    Once you feel comfortable with this exercise, begin incorporating this technique into your daily routine. For example, perform the knack when you feel you are about to sneeze to try to control instances of leakage.               "

## 2022-12-01 ENCOUNTER — OFFICE VISIT (OUTPATIENT)
Dept: ORTHOPEDICS | Facility: CLINIC | Age: 42
End: 2022-12-01
Payer: COMMERCIAL

## 2022-12-01 DIAGNOSIS — M72.2 PLANTAR FASCIITIS, LEFT: Primary | ICD-10-CM

## 2022-12-01 DIAGNOSIS — M25.371 ANKLE INSTABILITY, RIGHT: ICD-10-CM

## 2022-12-01 PROCEDURE — 99214 PR OFFICE/OUTPT VISIT, EST, LEVL IV, 30-39 MIN: ICD-10-PCS | Mod: 25,S$GLB,, | Performed by: ORTHOPAEDIC SURGERY

## 2022-12-01 PROCEDURE — 20551 TENDON ORIGIN: ICD-10-PCS | Mod: LT,S$GLB,, | Performed by: ORTHOPAEDIC SURGERY

## 2022-12-01 PROCEDURE — 99214 OFFICE O/P EST MOD 30 MIN: CPT | Mod: 25,S$GLB,, | Performed by: ORTHOPAEDIC SURGERY

## 2022-12-01 PROCEDURE — 3044F PR MOST RECENT HEMOGLOBIN A1C LEVEL <7.0%: ICD-10-PCS | Mod: CPTII,S$GLB,, | Performed by: ORTHOPAEDIC SURGERY

## 2022-12-01 PROCEDURE — 3061F NEG MICROALBUMINURIA REV: CPT | Mod: CPTII,S$GLB,, | Performed by: ORTHOPAEDIC SURGERY

## 2022-12-01 PROCEDURE — 3044F HG A1C LEVEL LT 7.0%: CPT | Mod: CPTII,S$GLB,, | Performed by: ORTHOPAEDIC SURGERY

## 2022-12-01 PROCEDURE — 4010F ACE/ARB THERAPY RXD/TAKEN: CPT | Mod: CPTII,S$GLB,, | Performed by: ORTHOPAEDIC SURGERY

## 2022-12-01 PROCEDURE — 4010F PR ACE/ARB THEARPY RXD/TAKEN: ICD-10-PCS | Mod: CPTII,S$GLB,, | Performed by: ORTHOPAEDIC SURGERY

## 2022-12-01 PROCEDURE — 3061F PR NEG MICROALBUMINURIA RESULT DOCUMENTED/REVIEW: ICD-10-PCS | Mod: CPTII,S$GLB,, | Performed by: ORTHOPAEDIC SURGERY

## 2022-12-01 PROCEDURE — 3072F PR LOW RISK FOR RETINOPATHY: ICD-10-PCS | Mod: CPTII,S$GLB,, | Performed by: ORTHOPAEDIC SURGERY

## 2022-12-01 PROCEDURE — 3066F PR DOCUMENTATION OF TREATMENT FOR NEPHROPATHY: ICD-10-PCS | Mod: CPTII,S$GLB,, | Performed by: ORTHOPAEDIC SURGERY

## 2022-12-01 PROCEDURE — 99999 PR PBB SHADOW E&M-EST. PATIENT-LVL III: CPT | Mod: PBBFAC,,, | Performed by: ORTHOPAEDIC SURGERY

## 2022-12-01 PROCEDURE — 3066F NEPHROPATHY DOC TX: CPT | Mod: CPTII,S$GLB,, | Performed by: ORTHOPAEDIC SURGERY

## 2022-12-01 PROCEDURE — 3072F LOW RISK FOR RETINOPATHY: CPT | Mod: CPTII,S$GLB,, | Performed by: ORTHOPAEDIC SURGERY

## 2022-12-01 PROCEDURE — 99999 PR PBB SHADOW E&M-EST. PATIENT-LVL III: ICD-10-PCS | Mod: PBBFAC,,, | Performed by: ORTHOPAEDIC SURGERY

## 2022-12-01 PROCEDURE — 20551 NJX 1 TENDON ORIGIN/INSJ: CPT | Mod: LT,S$GLB,, | Performed by: ORTHOPAEDIC SURGERY

## 2022-12-01 RX ADMIN — TRIAMCINOLONE ACETONIDE 40 MG: 40 INJECTION, SUSPENSION INTRA-ARTICULAR; INTRAMUSCULAR at 09:12

## 2022-12-01 NOTE — PROGRESS NOTES
Subjective:   Chief complaint: Follow-up right ankle and left foot.    HPI:   Sanjana Queen is a 42 y.o. female who presents today for follow-up.  Pain is 6/10.  Continues to have pain in left PFO and right ankle.  They improved some with the HEP that was provided previously.    ROS:  Musculoskeletal: per HPI     Objective:   Exam:  There were no vitals filed for this visit.  General: No acute distress, well-appearing  Musculoskeletal: Skin benign about PFO and ankle.    Imaging:  None    Additional testing/results reviewed:  Previous treatment- HEP prescribed at last visit; she has previously had PF CSI with good response from another provider            Assessment:     1. Plantar fasciitis, left    2. Ankle instability, right         Patient is seen for multiple problems (not at treatment goal) with uncertain prognosis.    Data: none    Treatment plan: Corticosteroid injection management discussed and provided (see procedure notes)     Discussed therapeutic and diagnostic benefit of CSI.       Plan:       See procedure notes  Phone f/up in 2 weeks to discuss response    No orders of the defined types were placed in this encounter.      Past Medical History:   Diagnosis Date    Anemia     Asthma     Cardiomyopathy, peripartum     GERD (gastroesophageal reflux disease)     Hypertension     Hypothyroidism     not at present    Morbid obesity     Peripartum cardiomyopathy 7/28/2015    Snoring        Past Surgical History:   Procedure Laterality Date    BLADDER SURGERY      BREAST LUMPECTOMY Left 11/12/2021    DILATION AND CURETTAGE OF UTERUS      ENDOMETRIAL ABLATION      FRACTURE SURGERY      gastric sleeve N/A     HERNIA REPAIR  2/1/2020    Dr Luu    hernia repair 2/2020      spleen surgery      TONSILLECTOMY      TUBAL LIGATION         Family History   Problem Relation Age of Onset    Diabetes Mother     Arthritis Mother         Ankles    Hypertension Mother     Diabetes Father     Hypertension Father      Heart disease Father     Early death Father         65 years of age heart Failure    Hypertension Maternal Grandmother     Cancer Maternal Grandmother     Hypertension Maternal Grandfather     Heart disease Maternal Grandfather     Hypertension Sister     Diabetes Sister     Cancer Paternal Aunt         Breast Cancer       Social History     Socioeconomic History    Marital status:    Tobacco Use    Smoking status: Former     Packs/day: 1.00     Years: 15.00     Pack years: 15.00     Types: Cigarettes     Start date: 3/1/1998     Quit date: 2018     Years since quittin.2    Smokeless tobacco: Never   Substance and Sexual Activity    Alcohol use: No    Drug use: No    Sexual activity: Yes     Partners: Male     Birth control/protection: See Surgical Hx     Comment: Uterine Ablation and Tubes removed     Social Determinants of Health     Financial Resource Strain: Medium Risk    Difficulty of Paying Living Expenses: Somewhat hard   Food Insecurity: No Food Insecurity    Worried About Running Out of Food in the Last Year: Never true    Ran Out of Food in the Last Year: Never true   Transportation Needs: No Transportation Needs    Lack of Transportation (Medical): No    Lack of Transportation (Non-Medical): No   Physical Activity: Insufficiently Active    Days of Exercise per Week: 2 days    Minutes of Exercise per Session: 20 min   Stress: Stress Concern Present    Feeling of Stress : To some extent   Social Connections: Unknown    Frequency of Communication with Friends and Family: More than three times a week    Frequency of Social Gatherings with Friends and Family: More than three times a week    Active Member of Clubs or Organizations: No    Attends Club or Organization Meetings: Never    Marital Status:    Housing Stability: Low Risk     Unable to Pay for Housing in the Last Year: No    Number of Places Lived in the Last Year: 1    Unstable Housing in the Last Year: No

## 2022-12-02 ENCOUNTER — CLINICAL SUPPORT (OUTPATIENT)
Dept: REHABILITATION | Facility: HOSPITAL | Age: 42
End: 2022-12-02
Payer: COMMERCIAL

## 2022-12-02 DIAGNOSIS — M62.89 PELVIC FLOOR DYSFUNCTION: ICD-10-CM

## 2022-12-02 DIAGNOSIS — N81.89 PELVIC FLOOR WEAKNESS: ICD-10-CM

## 2022-12-02 DIAGNOSIS — N39.3 STRESS INCONTINENCE: ICD-10-CM

## 2022-12-02 PROCEDURE — 97110 THERAPEUTIC EXERCISES: CPT | Mod: PO

## 2022-12-02 PROCEDURE — 97530 THERAPEUTIC ACTIVITIES: CPT | Mod: PO

## 2022-12-02 NOTE — PROGRESS NOTES
Pelvic Health Physical Therapy   Treatment Note     Name: Sanjana [facility] Number: [id number]    Therapy Diagnosis:   Encounter Diagnoses   Name Primary?    Stress incontinence     Pelvic floor dysfunction     Pelvic floor weakness       Physician: Philomena Wallis NP    Visit Date: 12/2/2022    Physician Orders: Pelvic PT Eval and Treat  Medical Diagnosis from Referral: Stress incontinence [N39.3]  Evaluation Date: 9/27/2022  Authorization Period Expiration: 12/31/2022  Plan of Care Expiration: 12/20/2022  Plan of Care Certification Period: 12/31/2022  Visit #/Visits authorized: 4/ 29   FOTO: 3/3 (9/27/22, 10/31/2022, 11/21/22)  Cancelled Visits: 0  No Show Visits: 0    Time In: 8:00  Time Out: 9:00  Total Billable Time: 60 minutes    Precautions: Standard    Subjective     Pt reports:Using the white Kegel weight - falls out MCC when doing laundry. She feels like nighttime has gotten a lot better - elevating her legs and double voiding has helped. She is still having leakage with heavy lifting and sneezing, coughing.      She was partially compliant with home exercise program.  Response to previous treatment: ongoing  Functional change: ongoing    Pain: 0/10  Location: Patient denies any pain today    Constitutional Symptoms Review: The patient denies having any constitutional symptoms.     Objective   Pt verbally consents to intravaginal treatment today.  Signed consent form already on file.     Sanjana received therapeutic exercises to develop  strength, endurance, ROM, and core stabilization for 50 minutes including: See table below    Sanjana received the following manual therapy techniques: to develop flexibility, extensibility, and desensitization for 0 minutes including:   See table below    Sanjana participated in neuromuscular re-education activities to develop Coordination, Control, Posture, Kinesthetic, and Balance for 0 minutes including:   See table below    Sanjana participated in  dynamic functional therapeutic activities to improve functional performance for 10 minutes, including:  See table below     Intervention 10/19/22 10/31/22 ReEval 11/21/22 12/2/22   Neuro Re-Ed Intravaginal Assessment - See Results below  x     TherAc Reviewed HEP and continued plan of care       TherEx Intravaginal assessment of pelvic floor muscle contractions - 15 reps endurance holds x      TherAc Intravaginal assessment of coordination of breath with pelvic floor muscle contraction - 10 reps x      TherAc Education re: coordination of breath with pelvic floor muscle contractions x x     TherAc Coordination of pelvic floor muscle contraction with breath - Inhale, Relax. Exhale, Squeeze - 3 x 5  x     TherAc Intimate Екатерина Kegel Weights - White weight size 1  With weight in:  Standing marches  Sit to stand - 3 sets of 5   x    TherAc Education re: Kegel weights for incorporation into PoC, care and use of  x x    TherAc Assigned bowel/bladder diary       TherAc Reviewed bowel/bladder diary x      TherAc Education re: restricting fluids before bed x  x    TherAc Education re:elevating legs before bed x x x    TherAc Education re: double voiding x x x    TherAc Education re: The Knack x x x    TherAc Education re: increasing voids - go every 2-4 hours while awake x      TherAc Education re: modifications to kegel weight use - insert higher up to ensure deep muscle contraction    x   TherAc Education re Squatty potty and bowel movements    x   TherEx Pelvic floor muscle contractions - endurance holds, 6 sets of 5 with 3 sec hold x      TherEx Pelvic floor muscle contractions - endurance holds, 3 sets of 10 with 3 sec hold   x x   TherEx Supine Adduction Ball Squeezes - 3 x 10 with 3 sec hold  x x    TherEx Side lying hip abduction - 3 x 10 bilateral    x x   TherEx Side lying clam shells - 3 x 10 bilateral   x     TherEx Standing hip extension - Yellow TB - 3 x 10 bilateral   x x x   TherEx Standing hip abduction -  Yellow TB - 3 x 10 bilateral   x  x   TherEx Coordination of Kegel with squat 1x10 bodyweight, 2x10 with 15lb DB    x   TherEx Standing hip adduction- Yellow TB - 3 x 10 bilateral     x     10/31/22 Re-Evaluation    VAGINAL PELVIC FLOOR EXAM    EXTERNAL ASSESSMENT  Introitus: WNL  Skin condition: WNL  Scarring: none   Pain:  none  Voluntary contraction: visible lift  Voluntary relaxation: visible drop  Involuntary contraction: nil  Bearing down: nil      INTERNAL ASSESSMENT   Pain: none   Sensation: able to localized pressure appropriately   Vaginal vault: WNL   Muscle Bulk: WFL   Muscle Power: 1/5  Muscle Endurance: 3 sec  # Reps To Fatigue: 4    Fast Contractions in 10 seconds: 6     Quality of contraction: decreased hold   Specificity: WNL   Coordination: tends to hold breath during PFM contration     From 9/27 Eval:   VAGINAL PELVIC FLOOR EXAM  EXTERNAL ASSESSMENT  Introitus: WNL  Skin condition: WNL  Scarring: none   Sensation: WNL   Pain:  none  Voluntary contraction: nil  Voluntary relaxation: nil  Involuntary contraction: bulge  Bearing down: bulge  Perineal descent: absent                            INTERNAL ASSESSMENT  Pain: none   Sensation: able to localized pressure appropriately   Vaginal vault: WNL   Muscle Bulk: WFL   Muscle Power: 1/5  Muscle Endurance: 3 sec  # Reps To Fatigue: 2    Fast Contractions in 10 seconds: 6                           Quality of contraction: slow rise, decreased hold, and slow relaxation   Specificity: WNL   Coordination: WNL   Prolapse check:not assessed today     Home Exercises Provided and Patient Education Provided     Education provided:   - bladder irritants, anatomy/physiology of pelvic floor, diaphragmatic breathing, kegels, and Coordination of kegels with functional activities such as cough, laugh, sneeze, lift, etc.   Discussed progression of plan of care with patient; educated pt in activity modification; reviewed HEP with pt. Pt demonstrated and verbalized  "understanding of all instruction and was provided with a handout of HEP (see Patient Instructions).    Written Home Exercises Provided: yes.  Exercises were reviewed and Sanjana was able to demonstrate them prior to the end of the session.  Sanjana demonstrated good  understanding of the education provided.     See EMR under Patient Instructions for exercises provided  11/21/2022 .    Assessment   Sanjana with good tolerance to treatment today. Patient with good performance of therapeutic exercises and activities, with minimal verbal cues needed to avoid breath holding. She continues to have KARSON with high impact activities such as lifting, squatting, and coughing, and will require further strength/coordination training to improve this. Patient is appropriate for continued skilled pelvic floor PT services at this time.     Sanjana Is progressing well towards her goals.   Pt prognosis is Fair.     Pt will continue to benefit from skilled outpatient physical therapy to address the deficits listed in the problem list box on initial evaluation, provide pt/family education and to maximize pt's level of independence in the home and community environment.     Pt's spiritual, cultural and educational needs considered and pt agreeable to plan of care and goals.     Anticipated barriers to physical therapy: none    Short Term Goals: 6 weeks  Goal Status Notes   Pt to be edu pelvic muscle bracing and be able to consistently perform correctly and quickly to help decrease incontinence with cough/laugh/sneeze. In Progress    Pt to perform "the knack" prior to coughing, laughing or sneezing to decrease risk of incontinence. In Progress    Pt to demonstrate being able to correctly and consistently perform a kegel which is needed  to increase pelvic floor muscle coordination and strength needed for continence. In Progress    Pt to report being able to transfer from sitting to standing without leakage of urine. In Progress    Pt to " voice understanding of the role that diet plays on urinary urgency.   Met    Pt to demonstrate an improved score in the FOTO Urinary Problem survey  to at least 56 to demonstrate improving symptoms and self management of condition.   In Progress       Long Term Goals: 12 weeks  Goal Status Notes   Pt to be discharged with home plan for carry over after discharge.   In Progress    Pt to report reduction of urinary incontinence by at least 50% with ADLs. In Progress    Pt to report no longer feeling the need to urinate just in case when shopping or participating in social activities to demonstrate improving pelvic floor and bladder control. In Progress    Pt to demonstrate an improved score in the FOTO Urinary Problem survey  to at least 63 to demonstrate improving symptoms and self management of condition.   In Progress    Pt to increase pelvic floor strength to at least 3/5 to demonstrate improved strength needed for continence with ADLs.  In Progress        Plan     Continue per established POC.     Karina Acosta, PT , DPT

## 2022-12-02 NOTE — PATIENT INSTRUCTIONS
Bowel movement body mechanics  1. Sit on the toilet comfortably with legs and buttocks relaxed.  2. Put your feet on a step stool or squatty potty (7-9 inches tall).  3. Lean forward while keeping your back straight and rest your elbows on your knees.  4. Keep your knees apart OR keep your feet apart and bring your knees together (whichever feels most natural).  5. Begin your belly breathing  6. Inhale, belly gets big, exhale KEEP belly big.  7. Exhale like you are gently blowing out birthday candles while you gently bear down in your pelvic floor.    Do not strain and Do not hold your breath.

## 2022-12-05 ENCOUNTER — PATIENT MESSAGE (OUTPATIENT)
Dept: INTERNAL MEDICINE | Facility: CLINIC | Age: 42
End: 2022-12-05
Payer: COMMERCIAL

## 2022-12-08 ENCOUNTER — PATIENT MESSAGE (OUTPATIENT)
Dept: OBSTETRICS AND GYNECOLOGY | Facility: CLINIC | Age: 42
End: 2022-12-08
Payer: COMMERCIAL

## 2022-12-09 ENCOUNTER — OFFICE VISIT (OUTPATIENT)
Dept: INTERNAL MEDICINE | Facility: CLINIC | Age: 42
End: 2022-12-09
Payer: COMMERCIAL

## 2022-12-09 VITALS
TEMPERATURE: 100 F | HEIGHT: 63 IN | WEIGHT: 267.63 LBS | BODY MASS INDEX: 47.42 KG/M2 | SYSTOLIC BLOOD PRESSURE: 122 MMHG | OXYGEN SATURATION: 99 % | DIASTOLIC BLOOD PRESSURE: 84 MMHG | HEART RATE: 74 BPM

## 2022-12-09 DIAGNOSIS — K59.09 OTHER CONSTIPATION: ICD-10-CM

## 2022-12-09 DIAGNOSIS — I15.2 HYPERTENSION ASSOCIATED WITH DIABETES: ICD-10-CM

## 2022-12-09 DIAGNOSIS — G44.209 TENSION HEADACHE: ICD-10-CM

## 2022-12-09 DIAGNOSIS — E11.69 HYPERLIPIDEMIA DUE TO TYPE 2 DIABETES MELLITUS: ICD-10-CM

## 2022-12-09 DIAGNOSIS — M62.89 PELVIC FLOOR DYSFUNCTION: ICD-10-CM

## 2022-12-09 DIAGNOSIS — F41.8 DEPRESSION WITH ANXIETY: ICD-10-CM

## 2022-12-09 DIAGNOSIS — E66.01 CLASS 3 SEVERE OBESITY DUE TO EXCESS CALORIES WITHOUT SERIOUS COMORBIDITY WITH BODY MASS INDEX (BMI) OF 45.0 TO 49.9 IN ADULT: ICD-10-CM

## 2022-12-09 DIAGNOSIS — E28.2 PCOS (POLYCYSTIC OVARIAN SYNDROME): ICD-10-CM

## 2022-12-09 DIAGNOSIS — E78.49 OTHER HYPERLIPIDEMIA: ICD-10-CM

## 2022-12-09 DIAGNOSIS — E78.5 HYPERLIPIDEMIA DUE TO TYPE 2 DIABETES MELLITUS: ICD-10-CM

## 2022-12-09 DIAGNOSIS — E03.9 ACQUIRED HYPOTHYROIDISM: ICD-10-CM

## 2022-12-09 DIAGNOSIS — E11.59 HYPERTENSION ASSOCIATED WITH DIABETES: ICD-10-CM

## 2022-12-09 DIAGNOSIS — D80.3 IGG DEFICIENCY: ICD-10-CM

## 2022-12-09 DIAGNOSIS — K21.9 GASTROESOPHAGEAL REFLUX DISEASE WITHOUT ESOPHAGITIS: ICD-10-CM

## 2022-12-09 DIAGNOSIS — Z00.00 ANNUAL PHYSICAL EXAM: Primary | ICD-10-CM

## 2022-12-09 DIAGNOSIS — J45.21 MILD INTERMITTENT ASTHMA WITH ACUTE EXACERBATION: ICD-10-CM

## 2022-12-09 DIAGNOSIS — E11.9 TYPE 2 DIABETES MELLITUS WITHOUT COMPLICATION, WITHOUT LONG-TERM CURRENT USE OF INSULIN: ICD-10-CM

## 2022-12-09 DIAGNOSIS — G25.81 RESTLESS LEG SYNDROME: ICD-10-CM

## 2022-12-09 PROBLEM — N81.89 PELVIC FLOOR WEAKNESS: Status: RESOLVED | Noted: 2022-10-19 | Resolved: 2022-12-09

## 2022-12-09 PROCEDURE — 99396 PR PREVENTIVE VISIT,EST,40-64: ICD-10-PCS | Mod: S$GLB,,, | Performed by: INTERNAL MEDICINE

## 2022-12-09 PROCEDURE — 3079F PR MOST RECENT DIASTOLIC BLOOD PRESSURE 80-89 MM HG: ICD-10-PCS | Mod: CPTII,S$GLB,, | Performed by: INTERNAL MEDICINE

## 2022-12-09 PROCEDURE — 3074F SYST BP LT 130 MM HG: CPT | Mod: CPTII,S$GLB,, | Performed by: INTERNAL MEDICINE

## 2022-12-09 PROCEDURE — 3072F LOW RISK FOR RETINOPATHY: CPT | Mod: CPTII,S$GLB,, | Performed by: INTERNAL MEDICINE

## 2022-12-09 PROCEDURE — 4010F PR ACE/ARB THEARPY RXD/TAKEN: ICD-10-PCS | Mod: CPTII,S$GLB,, | Performed by: INTERNAL MEDICINE

## 2022-12-09 PROCEDURE — 99999 PR PBB SHADOW E&M-EST. PATIENT-LVL IV: CPT | Mod: PBBFAC,,, | Performed by: INTERNAL MEDICINE

## 2022-12-09 PROCEDURE — 3074F PR MOST RECENT SYSTOLIC BLOOD PRESSURE < 130 MM HG: ICD-10-PCS | Mod: CPTII,S$GLB,, | Performed by: INTERNAL MEDICINE

## 2022-12-09 PROCEDURE — 99999 PR PBB SHADOW E&M-EST. PATIENT-LVL IV: ICD-10-PCS | Mod: PBBFAC,,, | Performed by: INTERNAL MEDICINE

## 2022-12-09 PROCEDURE — 3079F DIAST BP 80-89 MM HG: CPT | Mod: CPTII,S$GLB,, | Performed by: INTERNAL MEDICINE

## 2022-12-09 PROCEDURE — 3008F BODY MASS INDEX DOCD: CPT | Mod: CPTII,S$GLB,, | Performed by: INTERNAL MEDICINE

## 2022-12-09 PROCEDURE — 3008F PR BODY MASS INDEX (BMI) DOCUMENTED: ICD-10-PCS | Mod: CPTII,S$GLB,, | Performed by: INTERNAL MEDICINE

## 2022-12-09 PROCEDURE — 4010F ACE/ARB THERAPY RXD/TAKEN: CPT | Mod: CPTII,S$GLB,, | Performed by: INTERNAL MEDICINE

## 2022-12-09 PROCEDURE — 1159F MED LIST DOCD IN RCRD: CPT | Mod: CPTII,S$GLB,, | Performed by: INTERNAL MEDICINE

## 2022-12-09 PROCEDURE — 3044F HG A1C LEVEL LT 7.0%: CPT | Mod: CPTII,S$GLB,, | Performed by: INTERNAL MEDICINE

## 2022-12-09 PROCEDURE — 3072F PR LOW RISK FOR RETINOPATHY: ICD-10-PCS | Mod: CPTII,S$GLB,, | Performed by: INTERNAL MEDICINE

## 2022-12-09 PROCEDURE — 1159F PR MEDICATION LIST DOCUMENTED IN MEDICAL RECORD: ICD-10-PCS | Mod: CPTII,S$GLB,, | Performed by: INTERNAL MEDICINE

## 2022-12-09 PROCEDURE — 3044F PR MOST RECENT HEMOGLOBIN A1C LEVEL <7.0%: ICD-10-PCS | Mod: CPTII,S$GLB,, | Performed by: INTERNAL MEDICINE

## 2022-12-09 PROCEDURE — 99396 PREV VISIT EST AGE 40-64: CPT | Mod: S$GLB,,, | Performed by: INTERNAL MEDICINE

## 2022-12-09 RX ORDER — PANTOPRAZOLE SODIUM 40 MG/1
40 TABLET, DELAYED RELEASE ORAL DAILY
Qty: 90 TABLET | Refills: 3 | Status: SHIPPED | OUTPATIENT
Start: 2022-12-09

## 2022-12-09 RX ORDER — SEMAGLUTIDE 2.68 MG/ML
2 INJECTION, SOLUTION SUBCUTANEOUS
Qty: 9 ML | Refills: 3 | Status: SHIPPED | OUTPATIENT
Start: 2022-12-09 | End: 2023-01-19

## 2022-12-09 RX ORDER — BUPROPION HYDROCHLORIDE 300 MG/1
300 TABLET ORAL DAILY
Qty: 90 TABLET | Refills: 3 | Status: SHIPPED | OUTPATIENT
Start: 2022-12-09 | End: 2023-12-09

## 2022-12-09 RX ORDER — ROSUVASTATIN CALCIUM 20 MG/1
20 TABLET, COATED ORAL DAILY
Qty: 90 TABLET | Refills: 3 | Status: SHIPPED | OUTPATIENT
Start: 2022-12-09

## 2022-12-09 RX ORDER — SERTRALINE HYDROCHLORIDE 100 MG/1
100 TABLET, FILM COATED ORAL DAILY
Qty: 90 TABLET | Refills: 3 | Status: SHIPPED | OUTPATIENT
Start: 2022-12-09 | End: 2023-08-29

## 2022-12-09 RX ORDER — TIZANIDINE 4 MG/1
4 TABLET ORAL NIGHTLY PRN
Qty: 90 TABLET | Refills: 3 | Status: SHIPPED | OUTPATIENT
Start: 2022-12-09 | End: 2022-12-19

## 2022-12-09 RX ORDER — VERAPAMIL HYDROCHLORIDE 120 MG/1
120 CAPSULE, EXTENDED RELEASE ORAL DAILY
Qty: 90 CAPSULE | Refills: 3 | Status: SHIPPED | OUTPATIENT
Start: 2022-12-09 | End: 2024-03-17

## 2022-12-09 RX ORDER — VALSARTAN 160 MG/1
160 TABLET ORAL DAILY
Qty: 90 TABLET | Refills: 3 | Status: SHIPPED | OUTPATIENT
Start: 2022-12-09

## 2022-12-09 NOTE — ASSESSMENT & PLAN NOTE
Stable on crestor 20 mg daily, no myalgias.  The 10-year ASCVD risk score (Yuridia BECKETT, et al., 2019) is: 1.6%    Values used to calculate the score:      Age: 42 years      Sex: Female      Is Non- : No      Diabetic: Yes      Tobacco smoker: No      Systolic Blood Pressure: 120 mmHg      Is BP treated: Yes      HDL Cholesterol: 40 mg/dL      Total Cholesterol: 157 mg/dL

## 2022-12-09 NOTE — ASSESSMENT & PLAN NOTE
A1C 5.4 in 3/2022, on Ozempic 1 mg weekly. Previously on janumet. No hypoglycemia.  Hasn't been checking glucoses recently.

## 2022-12-09 NOTE — ASSESSMENT & PLAN NOTE
Stopped taking Requip because she didn't feel like it helped.  Legs are achy all the time. No issues at present.

## 2022-12-09 NOTE — PROGRESS NOTES
[facility] Note    Chief Complaint      Chief Complaint   Patient presents with    Annual Exam       History of Present Illness      Sanjana Queen is a 42 y.o. female who presents today for Annual preventative visit.  Patient comes to appointment alone.  GYN: Luan        Problem List Items Addressed This Visit       Mild intermittent asthma with acute exacerbation    Current Assessment & Plan     Sees Dr. Alexa Glover, allergist. Stable.         GERD (gastroesophageal reflux disease)    Current Assessment & Plan     Stable on protonix, no nausea/reflux.  Had hiatal hernia repaired 2/2020.         Relevant Medications    pantoprazole (PROTONIX) 40 MG tablet    Hypothyroid    Current Assessment & Plan     TSH WNL 9/2022. Not on meds.          Depression with anxiety    Current Assessment & Plan     Stable on Zoloft 50 mg and wellbutrin 300 mg daily.  No SI/HI/panic attacks.   Has been more quick tempered recently.    Was on lexapro in past, stopped 2/2 weight gain.  Viibryd caused vomiting.          Relevant Medications    sertraline (ZOLOFT) 100 MG tablet    buPROPion (WELLBUTRIN XL) 300 MG 24 hr tablet    Other constipation    Current Assessment & Plan     Stable on linzess and milk of mag PRN.  Works well for her.         Type 2 diabetes mellitus without complication, without long-term current use of insulin    Current Assessment & Plan     A1C 5.4 in 3/2022, on Ozempic 1 mg weekly. Previously on janumet. No hypoglycemia.  Hasn't been checking glucoses recently.         Relevant Medications    semaglutide (OZEMPIC) 2 mg/dose (8 mg/3 mL) PnIj    Other Relevant Orders    Microalbumin/Creatinine Ratio, Urine    Hemoglobin A1C    Hyperlipidemia due to type 2 diabetes mellitus    Current Assessment & Plan     Stable on crestor 20 mg daily, no myalgias.  The 10-year ASCVD risk score (Yuridia BECKETT, et al., 2019) is: 1.6%    Values used to calculate the score:      Age: 42 years      Sex: Female       Is Non- : No      Diabetic: Yes      Tobacco smoker: No      Systolic Blood Pressure: 120 mmHg      Is BP treated: Yes      HDL Cholesterol: 40 mg/dL      Total Cholesterol: 157 mg/dL           Relevant Medications    semaglutide (OZEMPIC) 2 mg/dose (8 mg/3 mL) PnIj    rosuvastatin (CRESTOR) 20 MG tablet    Hypertension associated with diabetes    Current Assessment & Plan     BP controlled on valsartan 160 mg daily, verapamil 120 mg daily, no CP/SOB/HA.   Has been watching salt.         Relevant Medications    semaglutide (OZEMPIC) 2 mg/dose (8 mg/3 mL) PnIj    valsartan (DIOVAN) 160 MG tablet    Restless leg syndrome    Current Assessment & Plan     Stopped taking Requip because she didn't feel like it helped.  Legs are achy all the time. No issues at present.         Relevant Medications    tiZANidine (ZANAFLEX) 4 MG tablet    IgG deficiency     Other Visit Diagnoses       Annual physical exam    -  Primary    Relevant Orders    CBC Auto Differential    Comprehensive Metabolic Panel    Lipid Panel    TSH    Tension headache        Relevant Medications    verapamiL (VERELAN) 120 MG C24P    Other hyperlipidemia        Relevant Medications    rosuvastatin (CRESTOR) 20 MG tablet            Health Maintenance   Topic Date Due    Mammogram  04/08/2022    Hemoglobin A1c  09/18/2022    TETANUS VACCINE  12/17/2022 (Originally 4/21/1999)    Eye Exam  01/20/2023    Lipid Panel  03/18/2023    Low Dose Statin  11/11/2023    Foot Exam  12/09/2023    Hepatitis C Screening  Completed       Past Medical History:   Diagnosis Date    Anemia     Asthma     Cardiomyopathy, peripartum     GERD (gastroesophageal reflux disease)     Hypertension     Hypothyroidism     not at present    Morbid obesity     Peripartum cardiomyopathy 7/28/2015    Snoring        Past Surgical History:   Procedure Laterality Date    BLADDER SURGERY      BREAST LUMPECTOMY Left 11/12/2021    DILATION AND CURETTAGE OF UTERUS       ENDOMETRIAL ABLATION      FRACTURE SURGERY      gastric sleeve N/A     HERNIA REPAIR  2020    Dr Luu    hernia repair 2020      spleen surgery      TONSILLECTOMY      TUBAL LIGATION         family history includes Arthritis in her mother; Cancer in her maternal grandmother and paternal aunt; Diabetes in her father, mother, and sister; Early death in her father; Heart disease in her father and maternal grandfather; Hypertension in her father, maternal grandfather, maternal grandmother, mother, and sister.    Social History     Tobacco Use    Smoking status: Former     Packs/day: 1.00     Years: 15.00     Pack years: 15.00     Types: Cigarettes     Start date: 3/1/1998     Quit date: 2018     Years since quittin.2    Smokeless tobacco: Never   Substance Use Topics    Alcohol use: No    Drug use: No       Review of Systems   Constitutional:  Negative for chills and fever.   HENT:  Negative for hearing loss and sore throat.    Eyes:  Negative for discharge.   Respiratory:  Negative for cough, shortness of breath and wheezing.    Cardiovascular:  Negative for chest pain and palpitations.   Gastrointestinal:  Positive for constipation. Negative for blood in stool, diarrhea, nausea and vomiting.   Genitourinary:  Negative for dysuria and hematuria.   Musculoskeletal:  Negative for falls and neck pain.   Neurological:  Negative for weakness and headaches.   Endo/Heme/Allergies:  Negative for polydipsia.      Outpatient Encounter Medications as of 2022   Medication Sig Dispense Refill    furosemide (LASIX) 40 MG tablet TAKE ONE AND ONE-HALF TABLETS DAILY AS NEEDED FOR SWELLING 135 tablet 3    HIZENTRA 10 gram/50 mL (20 %) Soln Inject 28 mg into the skin once a week.      Lactobac no.41/Bifidobact no.7 (PROBIOTIC-10 ORAL)       lifitegrast (XIIDRA) 5 % Dpet Apply 1 drop to eye every 12 (twelve) hours. 180 each 4    mupirocin calcium 2% (BACTROBAN) 2 % cream Apply topically 2 (two) times daily as needed  (skin boil). 30 g 1    olopatadine (PAZEO) 0.7 % Drop Place 1 drop into both eyes once daily. 2.5 mL 12    progesterone (PROMETRIUM) 100 MG capsule Take 1 capsule (100 mg total) by mouth nightly. 30 capsule 11    triamcinolone acetonide 0.1% (KENALOG) 0.1 % cream MARQUEZ AA BID  0    [DISCONTINUED] buPROPion (WELLBUTRIN XL) 300 MG 24 hr tablet Take 1 tablet (300 mg total) by mouth once daily. 90 tablet 3    [DISCONTINUED] linaCLOtide (LINZESS) 290 mcg Cap capsule Take 1 capsule (290 mcg total) by mouth once daily. 90 capsule 3    [DISCONTINUED] pantoprazole (PROTONIX) 40 MG tablet TAKE 1 TABLET DAILY 90 tablet 3    [DISCONTINUED] rosuvastatin (CRESTOR) 20 MG tablet Take 1 tablet (20 mg total) by mouth once daily. 90 tablet 3    [DISCONTINUED] semaglutide (OZEMPIC) 1 mg/dose (4 mg/3 mL) INJECT 1 MG UNDER THE SKIN EVERY 7 DAYS 3 pen 3    [DISCONTINUED] sertraline (ZOLOFT) 50 MG tablet Take 1 tablet (50 mg total) by mouth once daily. 90 tablet 3    [DISCONTINUED] valsartan (DIOVAN) 160 MG tablet TAKE 1 TABLET DAILY 90 tablet 0    [DISCONTINUED] verapamiL (VERELAN) 120 MG C24P Take 1 capsule (120 mg total) by mouth once daily. 90 capsule 3    buPROPion (WELLBUTRIN XL) 300 MG 24 hr tablet Take 1 tablet (300 mg total) by mouth once daily. 90 tablet 3    linaCLOtide (LINZESS) 290 mcg Cap capsule Take 1 capsule (290 mcg total) by mouth once daily. 90 capsule 3    pantoprazole (PROTONIX) 40 MG tablet Take 1 tablet (40 mg total) by mouth once daily. 90 tablet 3    rosuvastatin (CRESTOR) 20 MG tablet Take 1 tablet (20 mg total) by mouth once daily. 90 tablet 3    semaglutide (OZEMPIC) 2 mg/dose (8 mg/3 mL) PnIj Inject 2 mg into the skin every 7 days. 9 mL 3    sertraline (ZOLOFT) 100 MG tablet Take 1 tablet (100 mg total) by mouth once daily. 90 tablet 3    tiZANidine (ZANAFLEX) 4 MG tablet Take 1 tablet (4 mg total) by mouth nightly as needed (leg pain). 90 tablet 3    valsartan (DIOVAN) 160 MG tablet Take 1 tablet (160 mg total)  "by mouth once daily. 90 tablet 3    verapamiL (VERELAN) 120 MG C24P Take 1 capsule (120 mg total) by mouth once daily. 90 capsule 3    [DISCONTINUED] rOPINIRole (REQUIP) 0.5 MG tablet TAKE 1 TABLET EVERY EVENING (Patient not taking: Reported on 12/9/2022) 90 tablet 3     No facility-administered encounter medications on file as of 12/9/2022.        Review of patient's allergies indicates:   Allergen Reactions    Doxycycline Anaphylaxis and Other (See Comments)       Physical Exam      Vital Signs  Temp: 99.5 °F (37.5 °C)  Pulse: 74  SpO2: 99 %  BP: 122/84  Pain Score: 0-No pain  Height and Weight  Height: 5' 3" (160 cm)  Weight: 121.4 kg (267 lb 10.2 oz)  BSA (Calculated - sq m): 2.32 sq meters  BMI (Calculated): 47.4  Weight in (lb) to have BMI = 25: 140.8]    Physical Exam  Constitutional:       Appearance: She is well-developed.   HENT:      Head: Normocephalic and atraumatic.      Right Ear: External ear normal.      Left Ear: External ear normal.   Eyes:      General:         Right eye: No discharge.         Left eye: No discharge.   Cardiovascular:      Rate and Rhythm: Normal rate and regular rhythm.      Pulses:           Dorsalis pedis pulses are 2+ on the right side and 2+ on the left side.      Heart sounds: Normal heart sounds. No murmur heard.  Pulmonary:      Effort: Pulmonary effort is normal. No respiratory distress.      Breath sounds: Normal breath sounds.   Abdominal:      General: There is no distension.      Palpations: Abdomen is soft.      Tenderness: There is no abdominal tenderness. There is no guarding.   Musculoskeletal:         General: Normal range of motion.      Cervical back: Normal range of motion.      Right foot: Normal range of motion. No deformity.      Left foot: Normal range of motion. No deformity.   Feet:      Right foot:      Protective Sensation: 5 sites tested.  5 sites sensed.      Skin integrity: No ulcer or blister.      Left foot:      Protective Sensation: 5 sites " tested.  5 sites sensed.      Skin integrity: No ulcer or blister.   Skin:     General: Skin is warm and dry.   Neurological:      Mental Status: She is alert and oriented to person, place, and time.   Psychiatric:         Behavior: Behavior normal.        Laboratory:  CBC:  No results for input(s): WBC, RBC, HGB, HCT, PLT, MCV, MCH, MCHC in the last 2160 hours.  CMP:  No results for input(s): GLU, CALCIUM, ALBUMIN, PROT, NA, K, CO2, CL, BUN, ALKPHOS, ALT, AST, BILITOT in the last 2160 hours.    Invalid input(s): CREATININ  URINALYSIS:  No results for input(s): COLORU, CLARITYU, SPECGRAV, PHUR, PROTEINUA, GLUCOSEU, BILIRUBINCON, BLOODU, WBCU, RBCU, BACTERIA, MUCUS, NITRITE, LEUKOCYTESUR, UROBILINOGEN, HYALINECASTS in the last 2160 hours.   LIPIDS:  No results for input(s): TSH, HDL, CHOL, TRIG, LDLCALC, CHOLHDL, NONHDLCHOL, TOTALCHOLEST in the last 2160 hours.  TSH:  No results for input(s): TSH in the last 2160 hours.  A1C:  No results for input(s): HGBA1C in the last 2160 hours.    Radiology:  No results found in the last 30 days.     Assessment/Plan     Sanjana Queen is a 42 y.o.female with:    1. Annual physical exam  - CBC Auto Differential; Future  - Comprehensive Metabolic Panel; Future  - Lipid Panel; Future  - TSH; Future    2. Type 2 diabetes mellitus without complication, without long-term current use of insulin  - Microalbumin/Creatinine Ratio, Urine; Future  - Hemoglobin A1C; Future  - semaglutide (OZEMPIC) 2 mg/dose (8 mg/3 mL) PnIj; Inject 2 mg into the skin every 7 days.  Dispense: 9 mL; Refill: 3    3. Acquired hypothyroidism    4. Hypertension associated with diabetes  - valsartan (DIOVAN) 160 MG tablet; Take 1 tablet (160 mg total) by mouth once daily.  Dispense: 90 tablet; Refill: 3    5. Hyperlipidemia due to type 2 diabetes mellitus  - rosuvastatin (CRESTOR) 20 MG tablet; Take 1 tablet (20 mg total) by mouth once daily.  Dispense: 90 tablet; Refill: 3    6. Mild intermittent asthma  with acute exacerbation    7. Depression with anxiety  - sertraline (ZOLOFT) 100 MG tablet; Take 1 tablet (100 mg total) by mouth once daily.  Dispense: 90 tablet; Refill: 3  - buPROPion (WELLBUTRIN XL) 300 MG 24 hr tablet; Take 1 tablet (300 mg total) by mouth once daily.  Dispense: 90 tablet; Refill: 3    8. Restless leg syndrome  - tiZANidine (ZANAFLEX) 4 MG tablet; Take 1 tablet (4 mg total) by mouth nightly as needed (leg pain).  Dispense: 90 tablet; Refill: 3    9. Gastroesophageal reflux disease without esophagitis  - pantoprazole (PROTONIX) 40 MG tablet; Take 1 tablet (40 mg total) by mouth once daily.  Dispense: 90 tablet; Refill: 3    10. Other constipation    11. IgG deficiency    12. Tension headache  - verapamiL (VERELAN) 120 MG C24P; Take 1 capsule (120 mg total) by mouth once daily.  Dispense: 90 capsule; Refill: 3    13. Other hyperlipidemia  - rosuvastatin (CRESTOR) 20 MG tablet; Take 1 tablet (20 mg total) by mouth once daily.  Dispense: 90 tablet; Refill: 3      -Continue current medications and maintain follow up with specialists.    -Follow up in about 6 months (around 6/9/2023) for Annual Visit.       Gisselle Tavera MD  [facility]

## 2022-12-09 NOTE — ASSESSMENT & PLAN NOTE
Stable on Zoloft 50 mg and wellbutrin 300 mg daily.  No SI/HI/panic attacks.   Has been more quick tempered recently.    Was on lexapro in past, stopped 2/2 weight gain.  Viibryd caused vomiting.

## 2022-12-13 ENCOUNTER — PATIENT MESSAGE (OUTPATIENT)
Dept: INTERNAL MEDICINE | Facility: CLINIC | Age: 42
End: 2022-12-13
Payer: COMMERCIAL

## 2022-12-13 LAB
25(OH)D3 SERPL-MCNC: 26 NG/ML (ref 30–100)
ALBUMIN SERPL-MCNC: 3.9 G/DL (ref 3.6–5.1)
ALBUMIN/CREAT UR: 6 MCG/MG CREAT
ALBUMIN/GLOB SERPL: 1.2 (CALC) (ref 1–2.5)
ALP SERPL-CCNC: 80 U/L (ref 31–125)
ALT SERPL-CCNC: 15 U/L (ref 6–29)
AST SERPL-CCNC: 12 U/L (ref 10–30)
BASOPHILS # BLD AUTO: 43 CELLS/UL (ref 0–200)
BASOPHILS NFR BLD AUTO: 0.5 %
BILIRUB SERPL-MCNC: 0.5 MG/DL (ref 0.2–1.2)
BUN SERPL-MCNC: 13 MG/DL (ref 7–25)
BUN/CREAT SERPL: ABNORMAL (CALC) (ref 6–22)
CALCIUM SERPL-MCNC: 8.9 MG/DL (ref 8.6–10.2)
CHLORIDE SERPL-SCNC: 101 MMOL/L (ref 98–110)
CHOLEST SERPL-MCNC: 214 MG/DL
CHOLEST/HDLC SERPL: 4.3 (CALC)
CO2 SERPL-SCNC: 30 MMOL/L (ref 20–32)
CREAT SERPL-MCNC: 0.64 MG/DL (ref 0.5–0.99)
CREAT UR-MCNC: 108 MG/DL (ref 20–275)
EGFR: 114 ML/MIN/1.73M2
EOSINOPHIL # BLD AUTO: 120 CELLS/UL (ref 15–500)
EOSINOPHIL NFR BLD AUTO: 1.4 %
ERYTHROCYTE [DISTWIDTH] IN BLOOD BY AUTOMATED COUNT: 14.6 % (ref 11–15)
GLOBULIN SER CALC-MCNC: 3.3 G/DL (CALC) (ref 1.9–3.7)
GLUCOSE SERPL-MCNC: 102 MG/DL (ref 65–99)
HBA1C MFR BLD: 6.1 % OF TOTAL HGB
HCT VFR BLD AUTO: 41.1 % (ref 35–45)
HDLC SERPL-MCNC: 50 MG/DL
HGB BLD-MCNC: 13.4 G/DL (ref 11.7–15.5)
LDLC SERPL CALC-MCNC: 139 MG/DL (CALC)
LYMPHOCYTES # BLD AUTO: 2047 CELLS/UL (ref 850–3900)
LYMPHOCYTES NFR BLD AUTO: 23.8 %
MCH RBC QN AUTO: 27.2 PG (ref 27–33)
MCHC RBC AUTO-ENTMCNC: 32.6 G/DL (ref 32–36)
MCV RBC AUTO: 83.4 FL (ref 80–100)
MICROALBUMIN UR-MCNC: 0.6 MG/DL
MONOCYTES # BLD AUTO: 482 CELLS/UL (ref 200–950)
MONOCYTES NFR BLD AUTO: 5.6 %
NEUTROPHILS # BLD AUTO: 5908 CELLS/UL (ref 1500–7800)
NEUTROPHILS NFR BLD AUTO: 68.7 %
NONHDLC SERPL-MCNC: 164 MG/DL (CALC)
PLATELET # BLD AUTO: 322 THOUSAND/UL (ref 140–400)
PMV BLD REES-ECKER: 11.2 FL (ref 7.5–12.5)
POTASSIUM SERPL-SCNC: 4.8 MMOL/L (ref 3.5–5.3)
PROT SERPL-MCNC: 7.2 G/DL (ref 6.1–8.1)
RBC # BLD AUTO: 4.93 MILLION/UL (ref 3.8–5.1)
SODIUM SERPL-SCNC: 140 MMOL/L (ref 135–146)
TRIGL SERPL-MCNC: 123 MG/DL
TSH SERPL-ACNC: 3.12 MIU/L
WBC # BLD AUTO: 8.6 THOUSAND/UL (ref 3.8–10.8)

## 2022-12-15 ENCOUNTER — TELEPHONE (OUTPATIENT)
Dept: ORTHOPEDICS | Facility: CLINIC | Age: 42
End: 2022-12-15
Payer: COMMERCIAL

## 2022-12-15 NOTE — TELEPHONE ENCOUNTER
Spoke to pt and she is doing much better since injection----- Message from Joseph Knight sent at 12/1/2022  9:40 AM CST -----  Regarding: Injection follow up  Please check in with patient to see injection efficacy. LeftPlantar Fascia and right ankle CSi      What percent better:   Continued relief:     Thanks,    Joseph

## 2022-12-18 RX ORDER — TRIAMCINOLONE ACETONIDE 40 MG/ML
40 INJECTION, SUSPENSION INTRA-ARTICULAR; INTRAMUSCULAR
Status: DISCONTINUED | OUTPATIENT
Start: 2022-12-01 | End: 2022-12-18 | Stop reason: HOSPADM

## 2022-12-19 NOTE — PROCEDURES
Tendon Origin    Date/Time: 12/1/2022 9:45 AM  Performed by: Rhianna Chambers MD  Authorized by: Rhianna Chambers MD     Consent Done?:  Yes (Verbal)  Timeout: prior to procedure the correct patient, procedure, and site was verified    Indications:  Pain  Site marked: the procedure site was marked    Timeout: prior to procedure the correct patient, procedure, and site was verified    Location: left plantar fascia origin.  Prep: patient was prepped and draped in usual sterile fashion    Needle size:  22 G  Approach: medial calcaneal.  Medications:  40 mg triamcinolone acetonide 40 mg/mL  Patient tolerance:  Patient tolerated the procedure well with no immediate complications

## 2023-01-06 ENCOUNTER — OFFICE VISIT (OUTPATIENT)
Dept: UROLOGY | Facility: CLINIC | Age: 43
End: 2023-01-06
Payer: COMMERCIAL

## 2023-01-06 DIAGNOSIS — M62.89 PELVIC FLOOR DYSFUNCTION: ICD-10-CM

## 2023-01-06 PROCEDURE — 99999 PR PBB SHADOW E&M-EST. PATIENT-LVL III: ICD-10-PCS | Mod: PBBFAC,,, | Performed by: UROLOGY

## 2023-01-06 PROCEDURE — 1159F MED LIST DOCD IN RCRD: CPT | Mod: CPTII,S$GLB,, | Performed by: UROLOGY

## 2023-01-06 PROCEDURE — 99204 OFFICE O/P NEW MOD 45 MIN: CPT | Mod: S$GLB,,, | Performed by: UROLOGY

## 2023-01-06 PROCEDURE — 99204 PR OFFICE/OUTPT VISIT, NEW, LEVL IV, 45-59 MIN: ICD-10-PCS | Mod: S$GLB,,, | Performed by: UROLOGY

## 2023-01-06 PROCEDURE — 1159F PR MEDICATION LIST DOCUMENTED IN MEDICAL RECORD: ICD-10-PCS | Mod: CPTII,S$GLB,, | Performed by: UROLOGY

## 2023-01-06 PROCEDURE — 99999 PR PBB SHADOW E&M-EST. PATIENT-LVL III: CPT | Mod: PBBFAC,,, | Performed by: UROLOGY

## 2023-01-06 NOTE — PROGRESS NOTES
[facility]      New Urinary Incontinence Note    1/6/2023    Referred by:  Gisselle Tavera MD    HPI: Sanjana Queen is a very pleasant 42 y.o. female who is a new patient to our department referred for evaluation of urinary incontinence of several years duration. She reports stress incontinence associated with coughing lifting laughing sneezing or other exertional activities. She reports no urgency incontinence or urgency symptoms. She requires adult briefs (1 briefs/day). She reports urinary incontinence is only during the day.     She denies symptoms of irritative voiding including dysuria, frequency, and urgency. She denies symptoms of obstructive voiding including decreased stream, hesitancy, intermittency, post void dribbling, and sense of incomplete emptying. Bladder scan PVR was 0mL.  Her history includes no notation of urolithiasis, hematuria, prior pelvic surgery, previous prolapse or incontinence procedures or neurological symptoms/diagnoses. She denies all other prior pelvic surgeries or neurologic diagnoses. She had what may have been a urethral dilation as a 4-year old. She does not report symptoms suggestive of advanced POP.         Previous treatments for her stress incontinence have included:   pelvic floor exercises guided by PFPT    A review of 10+ systems was conducted with pertinent positive and negative findings documented in HPI with all other systems reviewed and negative.    Past medical, family, surgical and social history reviewed as documented in chart with pertinent positive medical, family, surgical and social history detailed in HPI.    Exam Findings:    Vaginal Mucosa: normal  Anterior: none  Apical: no apical descent  Posterior: none  KARSON: moderate KARSON  Urethral Mobility: urethral hypermobility  Urethral Lesions: no lesions or masses Const: no acute distress, conversant and alert  Eyes: anicteric, extraocular muscles intact  ENMT: normocephalic, Nl oral  membranes  Cardio: no cyanosis, nl cap refill  Pulm: no tachypnea; no resp distress  Abd: soft, no tenderness  Musc: no laceration, no tenderness  Neuro: alert; oriented x 3  Skin: warm, dry; no petichiae  Psych: no anxiety; normal speech       Assessment/Plan:    Stress Urinary Incontinence (new, addt'l workup): She reports urinary incontinence suggestive of KARSON which the patient feels is bothersome enough to warrant further therapy. She believes KARSON is predominant.  Her previous treatments for KARSON have included pelvic floor exercises guided by PFPT. There are not reported symptoms of voiding difficulty. There is not a history suspicious for neurogenic bladder or voiding dysfunction.     Patient will be scheduled for Retropubic midurethral sling in the OR. We discussed that her risk factors for sling complications are not increased over those of most patients. Specific risks discussed included urinary retention, continued urinary incontinence (both stress and urgency incontinence), development of new incontinence, vaginal exposure or sling, perforation of the bladder, urethra or bowel and anesthetic risks. Surgical alternatives such as urethral bulking and pubovaginal sling were discussed including the limitations and risks of these procedures. Non-surgical alternatives including additional pelvic floor exercises and pessary-type devices were discussed as well. The patient asked multiple appropriate questions indicating an understanding of the risks and benefits involved with this surgery. She would like to proceed.

## 2023-01-09 ENCOUNTER — PATIENT MESSAGE (OUTPATIENT)
Dept: UROLOGY | Facility: CLINIC | Age: 43
End: 2023-01-09
Payer: COMMERCIAL

## 2023-01-10 ENCOUNTER — TELEPHONE (OUTPATIENT)
Dept: UROLOGY | Facility: CLINIC | Age: 43
End: 2023-01-10
Payer: COMMERCIAL

## 2023-01-10 ENCOUNTER — PATIENT MESSAGE (OUTPATIENT)
Dept: ADMINISTRATIVE | Facility: OTHER | Age: 43
End: 2023-01-10
Payer: COMMERCIAL

## 2023-01-10 DIAGNOSIS — N39.3 STRESS INCONTINENCE: Primary | ICD-10-CM

## 2023-01-13 ENCOUNTER — TELEPHONE (OUTPATIENT)
Dept: UROLOGY | Facility: CLINIC | Age: 43
End: 2023-01-13
Payer: COMMERCIAL

## 2023-01-13 RX ORDER — TIZANIDINE 4 MG/1
4 TABLET ORAL EVERY 6 HOURS PRN
COMMUNITY
End: 2023-08-29 | Stop reason: SDUPTHER

## 2023-01-13 NOTE — TELEPHONE ENCOUNTER
Called pt to confirm arrival time of 5:00am for procedure on 01-. Gave pt NPO instructions and gave pt opportunity to ask questions. Pt verbalized understanding.

## 2023-01-17 ENCOUNTER — ANESTHESIA (OUTPATIENT)
Dept: SURGERY | Facility: HOSPITAL | Age: 43
End: 2023-01-17
Payer: COMMERCIAL

## 2023-01-17 ENCOUNTER — HOSPITAL ENCOUNTER (OUTPATIENT)
Facility: HOSPITAL | Age: 43
Discharge: HOME | End: 2023-01-17
Attending: UROLOGY | Admitting: UROLOGY
Payer: COMMERCIAL

## 2023-01-17 ENCOUNTER — ANESTHESIA EVENT (OUTPATIENT)
Dept: SURGERY | Facility: HOSPITAL | Age: 43
End: 2023-01-17
Payer: COMMERCIAL

## 2023-01-17 VITALS
RESPIRATION RATE: 18 BRPM | TEMPERATURE: 98 F | HEART RATE: 69 BPM | BODY MASS INDEX: 46.25 KG/M2 | HEIGHT: 63 IN | WEIGHT: 261 LBS | OXYGEN SATURATION: 96 % | SYSTOLIC BLOOD PRESSURE: 159 MMHG | DIASTOLIC BLOOD PRESSURE: 86 MMHG

## 2023-01-17 DIAGNOSIS — N39.3 SUI (STRESS URINARY INCONTINENCE, FEMALE): Primary | ICD-10-CM

## 2023-01-17 LAB
B-HCG UR QL: NEGATIVE
CTP QC/QA: YES
POCT GLUCOSE: 101 MG/DL (ref 70–110)
POCT GLUCOSE: 87 MG/DL (ref 70–110)

## 2023-01-17 PROCEDURE — D9220A PRA ANESTHESIA: Mod: CRNA,,, | Performed by: NURSE ANESTHETIST, CERTIFIED REGISTERED

## 2023-01-17 PROCEDURE — 71000015 HC POSTOP RECOV 1ST HR: Performed by: UROLOGY

## 2023-01-17 PROCEDURE — 37000008 HC ANESTHESIA 1ST 15 MINUTES: Performed by: UROLOGY

## 2023-01-17 PROCEDURE — 36000707: Performed by: UROLOGY

## 2023-01-17 PROCEDURE — 82962 GLUCOSE BLOOD TEST: CPT | Performed by: UROLOGY

## 2023-01-17 PROCEDURE — 63600175 PHARM REV CODE 636 W HCPCS: Performed by: STUDENT IN AN ORGANIZED HEALTH CARE EDUCATION/TRAINING PROGRAM

## 2023-01-17 PROCEDURE — 63600175 PHARM REV CODE 636 W HCPCS: Performed by: NURSE ANESTHETIST, CERTIFIED REGISTERED

## 2023-01-17 PROCEDURE — 71000044 HC DOSC ROUTINE RECOVERY FIRST HOUR: Performed by: UROLOGY

## 2023-01-17 PROCEDURE — 25000003 PHARM REV CODE 250: Performed by: NURSE ANESTHETIST, CERTIFIED REGISTERED

## 2023-01-17 PROCEDURE — 71000016 HC POSTOP RECOV ADDL HR: Performed by: UROLOGY

## 2023-01-17 PROCEDURE — D9220A PRA ANESTHESIA: ICD-10-PCS | Mod: CRNA,,, | Performed by: NURSE ANESTHETIST, CERTIFIED REGISTERED

## 2023-01-17 PROCEDURE — 63600175 PHARM REV CODE 636 W HCPCS: Performed by: ANESTHESIOLOGY

## 2023-01-17 PROCEDURE — 25000003 PHARM REV CODE 250: Performed by: STUDENT IN AN ORGANIZED HEALTH CARE EDUCATION/TRAINING PROGRAM

## 2023-01-17 PROCEDURE — 57288 PR SLING OPER STRES INCONTINENCE: ICD-10-PCS | Mod: ,,, | Performed by: UROLOGY

## 2023-01-17 PROCEDURE — 63600175 PHARM REV CODE 636 W HCPCS

## 2023-01-17 PROCEDURE — 36000706: Performed by: UROLOGY

## 2023-01-17 PROCEDURE — D9220A PRA ANESTHESIA: Mod: ANES,,, | Performed by: ANESTHESIOLOGY

## 2023-01-17 PROCEDURE — C1771 REP DEV, URINARY, W/SLING: HCPCS | Performed by: UROLOGY

## 2023-01-17 PROCEDURE — 57288 REPAIR BLADDER DEFECT: CPT | Mod: ,,, | Performed by: UROLOGY

## 2023-01-17 PROCEDURE — 27201423 OPTIME MED/SURG SUP & DEVICES STERILE SUPPLY: Performed by: UROLOGY

## 2023-01-17 PROCEDURE — D9220A PRA ANESTHESIA: ICD-10-PCS | Mod: ANES,,, | Performed by: ANESTHESIOLOGY

## 2023-01-17 PROCEDURE — 37000009 HC ANESTHESIA EA ADD 15 MINS: Performed by: UROLOGY

## 2023-01-17 PROCEDURE — 81025 URINE PREGNANCY TEST: CPT | Performed by: UROLOGY

## 2023-01-17 DEVICE — SLING MIDURETHRAL LYNX MESH: Type: IMPLANTABLE DEVICE | Site: VAGINA | Status: FUNCTIONAL

## 2023-01-17 RX ORDER — PROPOFOL 10 MG/ML
VIAL (ML) INTRAVENOUS
Status: DISCONTINUED | OUTPATIENT
Start: 2023-01-17 | End: 2023-01-17

## 2023-01-17 RX ORDER — HALOPERIDOL 5 MG/ML
0.5 INJECTION INTRAMUSCULAR EVERY 10 MIN PRN
Status: DISCONTINUED | OUTPATIENT
Start: 2023-01-17 | End: 2023-01-17 | Stop reason: HOSPADM

## 2023-01-17 RX ORDER — LIDOCAINE HYDROCHLORIDE 20 MG/ML
INJECTION, SOLUTION EPIDURAL; INFILTRATION; INTRACAUDAL; PERINEURAL
Status: DISCONTINUED | OUTPATIENT
Start: 2023-01-17 | End: 2023-01-17

## 2023-01-17 RX ORDER — DEXAMETHASONE SODIUM PHOSPHATE 4 MG/ML
INJECTION, SOLUTION INTRA-ARTICULAR; INTRALESIONAL; INTRAMUSCULAR; INTRAVENOUS; SOFT TISSUE
Status: DISCONTINUED | OUTPATIENT
Start: 2023-01-17 | End: 2023-01-17

## 2023-01-17 RX ORDER — NEOSTIGMINE METHYLSULFATE 0.5 MG/ML
INJECTION, SOLUTION INTRAVENOUS
Status: DISCONTINUED | OUTPATIENT
Start: 2023-01-17 | End: 2023-01-17

## 2023-01-17 RX ORDER — ACETAMINOPHEN 10 MG/ML
INJECTION, SOLUTION INTRAVENOUS
Status: DISCONTINUED | OUTPATIENT
Start: 2023-01-17 | End: 2023-01-17

## 2023-01-17 RX ORDER — SCOLOPAMINE TRANSDERMAL SYSTEM 1 MG/1
1 PATCH, EXTENDED RELEASE TRANSDERMAL
Status: DISCONTINUED | OUTPATIENT
Start: 2023-01-17 | End: 2023-01-17 | Stop reason: HOSPADM

## 2023-01-17 RX ORDER — OXYCODONE HYDROCHLORIDE 5 MG/1
TABLET ORAL
Status: DISCONTINUED
Start: 2023-01-17 | End: 2023-01-17 | Stop reason: HOSPADM

## 2023-01-17 RX ORDER — SUCCINYLCHOLINE CHLORIDE 20 MG/ML
INJECTION INTRAMUSCULAR; INTRAVENOUS
Status: DISCONTINUED | OUTPATIENT
Start: 2023-01-17 | End: 2023-01-17

## 2023-01-17 RX ORDER — ONDANSETRON 2 MG/ML
INJECTION INTRAMUSCULAR; INTRAVENOUS
Status: DISCONTINUED | OUTPATIENT
Start: 2023-01-17 | End: 2023-01-17

## 2023-01-17 RX ORDER — LIDOCAINE HYDROCHLORIDE 10 MG/ML
1 INJECTION, SOLUTION EPIDURAL; INFILTRATION; INTRACAUDAL; PERINEURAL ONCE
Status: DISCONTINUED | OUTPATIENT
Start: 2023-01-17 | End: 2023-01-17 | Stop reason: HOSPADM

## 2023-01-17 RX ORDER — SODIUM CHLORIDE 0.9 % (FLUSH) 0.9 %
10 SYRINGE (ML) INJECTION
Status: DISCONTINUED | OUTPATIENT
Start: 2023-01-17 | End: 2023-01-17 | Stop reason: HOSPADM

## 2023-01-17 RX ORDER — MIDAZOLAM HYDROCHLORIDE 1 MG/ML
INJECTION, SOLUTION INTRAMUSCULAR; INTRAVENOUS
Status: DISCONTINUED | OUTPATIENT
Start: 2023-01-17 | End: 2023-01-17

## 2023-01-17 RX ORDER — FENTANYL CITRATE 50 UG/ML
INJECTION, SOLUTION INTRAMUSCULAR; INTRAVENOUS
Status: DISCONTINUED | OUTPATIENT
Start: 2023-01-17 | End: 2023-01-17

## 2023-01-17 RX ORDER — FENTANYL CITRATE 50 UG/ML
25 INJECTION, SOLUTION INTRAMUSCULAR; INTRAVENOUS EVERY 5 MIN PRN
Status: DISCONTINUED | OUTPATIENT
Start: 2023-01-17 | End: 2023-01-17 | Stop reason: HOSPADM

## 2023-01-17 RX ORDER — ONDANSETRON 2 MG/ML
4 INJECTION INTRAMUSCULAR; INTRAVENOUS ONCE AS NEEDED
Status: DISCONTINUED | OUTPATIENT
Start: 2023-01-17 | End: 2023-01-17 | Stop reason: HOSPADM

## 2023-01-17 RX ORDER — OXYCODONE HYDROCHLORIDE 5 MG/1
5 TABLET ORAL EVERY 4 HOURS PRN
Qty: 5 TABLET | Refills: 0 | Status: SHIPPED | OUTPATIENT
Start: 2023-01-17 | End: 2023-03-17

## 2023-01-17 RX ORDER — ROCURONIUM BROMIDE 10 MG/ML
INJECTION, SOLUTION INTRAVENOUS
Status: DISCONTINUED | OUTPATIENT
Start: 2023-01-17 | End: 2023-01-17

## 2023-01-17 RX ORDER — OXYCODONE HYDROCHLORIDE 5 MG/1
5 TABLET ORAL ONCE
Status: COMPLETED | OUTPATIENT
Start: 2023-01-17 | End: 2023-01-17

## 2023-01-17 RX ORDER — FENTANYL CITRATE 50 UG/ML
INJECTION, SOLUTION INTRAMUSCULAR; INTRAVENOUS
Status: COMPLETED
Start: 2023-01-17 | End: 2023-01-17

## 2023-01-17 RX ADMIN — FENTANYL CITRATE 25 MCG: 50 INJECTION, SOLUTION INTRAMUSCULAR; INTRAVENOUS at 08:01

## 2023-01-17 RX ADMIN — ACETAMINOPHEN 1000 MG: 10 INJECTION INTRAVENOUS at 07:01

## 2023-01-17 RX ADMIN — ONDANSETRON 4 MG: 2 INJECTION INTRAMUSCULAR; INTRAVENOUS at 07:01

## 2023-01-17 RX ADMIN — CEFAZOLIN 2 G: 2 INJECTION, POWDER, FOR SOLUTION INTRAMUSCULAR; INTRAVENOUS at 07:01

## 2023-01-17 RX ADMIN — ROCURONIUM BROMIDE 5 MG: 10 INJECTION INTRAVENOUS at 07:01

## 2023-01-17 RX ADMIN — SODIUM CHLORIDE: 0.9 INJECTION, SOLUTION INTRAVENOUS at 07:01

## 2023-01-17 RX ADMIN — NEOSTIGMINE METHYLSULFATE 3 MG: 0.5 INJECTION, SOLUTION INTRAVENOUS at 08:01

## 2023-01-17 RX ADMIN — OXYCODONE 5 MG: 5 TABLET ORAL at 08:01

## 2023-01-17 RX ADMIN — PROPOFOL 200 MG: 10 INJECTION, EMULSION INTRAVENOUS at 07:01

## 2023-01-17 RX ADMIN — MIDAZOLAM 2 MG: 1 INJECTION INTRAMUSCULAR; INTRAVENOUS at 07:01

## 2023-01-17 RX ADMIN — GLYCOPYRROLATE 0.4 MG: 0.2 INJECTION INTRAMUSCULAR; INTRAVENOUS at 08:01

## 2023-01-17 RX ADMIN — DEXAMETHASONE SODIUM PHOSPHATE 4 MG: 4 INJECTION INTRA-ARTICULAR; INTRALESIONAL; INTRAMUSCULAR; INTRAVENOUS; SOFT TISSUE at 07:01

## 2023-01-17 RX ADMIN — GLYCOPYRROLATE 0.2 MG: 0.2 INJECTION INTRAMUSCULAR; INTRAVENOUS at 07:01

## 2023-01-17 RX ADMIN — SUCCINYLCHOLINE CHLORIDE 120 MG: 20 INJECTION, SOLUTION INTRAMUSCULAR; INTRAVENOUS; PARENTERAL at 07:01

## 2023-01-17 RX ADMIN — FENTANYL CITRATE 100 MCG: 50 INJECTION, SOLUTION INTRAMUSCULAR; INTRAVENOUS at 07:01

## 2023-01-17 RX ADMIN — ROCURONIUM BROMIDE 20 MG: 10 INJECTION INTRAVENOUS at 07:01

## 2023-01-17 RX ADMIN — LIDOCAINE HYDROCHLORIDE 50 MG: 20 INJECTION, SOLUTION EPIDURAL; INFILTRATION; INTRACAUDAL; PERINEURAL at 07:01

## 2023-01-17 NOTE — ANESTHESIA POSTPROCEDURE EVALUATION
Anesthesia Post Evaluation    Patient: Sanjana Queen    Procedure(s) Performed: Procedure(s) (LRB):  RETRO PUBIC SLING, MIDURETHRAL (N/A)    Final Anesthesia Type: general      Patient location during evaluation: PACU  Patient participation: Yes- Able to Participate  Level of consciousness: awake and alert and oriented  Post-procedure vital signs: reviewed and stable  Pain management: adequate  Airway patency: patent    PONV status at discharge: No PONV  Anesthetic complications: no      Cardiovascular status: blood pressure returned to baseline and hemodynamically stable  Respiratory status: unassisted, spontaneous ventilation and room air  Hydration status: euvolemic  Follow-up not needed.          Vitals Value Taken Time   /86 01/17/23 1002   Temp 36.6 °C (97.9 °F) 01/17/23 1000   Pulse 70 01/17/23 1009   Resp 16 01/17/23 1009   SpO2 95 % 01/17/23 1009   Vitals shown include unvalidated device data.      No case tracking events are documented in the log.      Pain/Bonnie Score: Pain Rating Prior to Med Admin: 7 (1/17/2023  8:52 AM)  Pain Rating Post Med Admin: 3 (pt. states tolerable.) (1/17/2023  9:15 AM)  Bonnie Score: 10 (1/17/2023 10:00 AM)

## 2023-01-17 NOTE — OP NOTE
Mid-Urethral Sling Operative Note  Date: 2023    Preoperative Diagnosis:   Stress Urinary Incontinence    Postoperative Diagnosis:  Stress Urinary Incontinence    Procedure:  Retropubic mid-urethral synthetic sling (66759)  Cystourethroscopy    Attending Surgeon: Sam Rice MD    Anesthesia: General Endotracheal    EBL: 20 mL    Complications: None    Findings: Normal Cystourethroscopy    Drains: None    Reason for procedure: Sanjana Queen is a very pleasant 42 y.o. female who presented with a history of stress urinary incontinence. After trials of more conservative therapy including pelvic floor exercises, she elected to undergo a retropubic mid-urethral synthetic sling as primary treatment of her incontinence. We discussed in detail the risk and benefits of this procedure including continued incontinence, urethral or bladder perforation, bowel perforation, voiding dysfunction or urinary retention, and vaginal exposure of the sling.     Procedure in detail:  Informed consent was obtained by explaining all risks and benefits of the procedure to the patient in detail.  After informed consent, the patient was brought to the OR where General Endotracheal anesthesia  was administered by the anesthesia staff. Appropriate perioperative antibiotics were given within 30 minutes of beginning the procedure. A formal timeout was performed prior to the procedure. After induction, the patient was gently placed in lithotomy position with all pressure points padded. Bilateral sequential compression devices were applied and activated prior to induction. The patient was prepped and draped in standard fashion.    We confirmed that the synthetic sling was present, was not  and appeared ready for implant.     A 16-Iraqi Duran catheter was placed to gravity and a weighted vaginal speculum was placed to gravity. After identifying the mid-urethra, 10 ml of 0.25% marcaine with epinephrine was injected into the  overlying vaginal mucosa. A midline vaginal incision was made and bilateral vaginal wall flaps were dissected on either side of the urethra up to the level of the endopelvic fascia. The fascia was not perforated.     The planned insertion site for the [employer] retropubic mid-urethral sling was identified approximately 2 cm lateral to the midline just above the pubic sympysis. The trocars were guided along the posterior surface of the pubic bone until felt by fingers placed in the incisions below. The trocars were then passed into the incisions with digital guidance. This was performed bilaterally in an identical fashion. Flexible cystourethroscopy was then performed with confirmation that there was no perforation of the bladder and that needles moved independently of the overlying detrusor. The scope was withdrawn. The 16-Setswana Duran catheter was returned to gravity.     The retropubic sling arms were then attached to the trocars which were then pulled back through the incision. The lack of tension on the sling was achieved by placing a large Alexa clamp between the urethra and sling and confirmed by observing a small gap between the sling and urethra. After irrigation, the vaginal mucosa was carefully closed in a running fashion using a 2-0 vicryl suture. Hemostasis was confirmed. Skin puncture sites were covered with Dermabond. The catheter was removed to facilitate voiding trial in the recovery room.     She tolerated the procedure well and was extubated and transferred in stable condition to the recovery room. All counts were confirmed correct.     We will plan to see her back in 6-8 weeks for continued evaluation.     Bean Magallon MD

## 2023-01-17 NOTE — ANESTHESIA PROCEDURE NOTES
Intubation    Date/Time: 1/17/2023 7:09 AM  Performed by: Johnny Lowe III, CRNA  Authorized by: Karly Martinez MD     Intubation:     Induction:  Intravenous    Intubated:  Postinduction    Mask Ventilation:  Easy mask    Attempts:  1    Attempted By:  CRNA    Method of Intubation:  Direct    Blade:  Aminata 3    Laryngeal View Grade: Grade I - full view of cords      Difficult Airway Encountered?: No      Complications:  None    Airway Device:  Oral endotracheal tube    Airway Device Size:  7.5    Style/Cuff Inflation:  Cuffed (inflated to minimal occlusive pressure)    Inflation Amount (mL):  6    Tube secured:  21    Secured at:  The lips    Placement Verified By:  Capnometry    Complicating Factors:  None    Findings Post-Intubation:  BS equal bilateral

## 2023-01-17 NOTE — BRIEF OP NOTE
[facility] - Surgery ([facility])  Brief Operative Note    Surgery Date: 1/17/2023     Surgeon(s) and Role:     * Sam Rice MD - Primary     * Bean Magallon MD - Resident - Assisting        Pre-op Diagnosis:  Stress incontinence [N39.3]    Post-op Diagnosis:  Post-Op Diagnosis Codes:     * Stress incontinence [N39.3]    Procedure(s) (LRB):  RETRO PUBIC SLING, MIDURETHRAL (N/A)    Anesthesia: General    Operative Findings:   - Sling placed without complication  - Cystoscopy without suspicion of bladder injury/involvement  - Hemostasis achieved    Estimated Blood Loss: * No values recorded between 1/17/2023  7:24 AM and 1/17/2023  8:14 AM *         Specimens:   Specimen (24h ago, onward)      None              Discharge Note    OUTCOME: Patient tolerated treatment/procedure well without complication and is now ready for discharge.    DISPOSITION: Home or Self Care    FINAL DIAGNOSIS:  Stress incontinence    FOLLOWUP: In clinic    DISCHARGE INSTRUCTIONS:    Discharge Procedure Orders   Diet Adult Regular     Activity as tolerated

## 2023-01-17 NOTE — TRANSFER OF CARE
"Anesthesia Transfer of Care Note    Patient: Sanjana Queen    Procedure(s) Performed: Procedure(s) (LRB):  RETRO PUBIC SLING, MIDURETHRAL (N/A)    Patient location: [facility]    Anesthesia Type: general    Transport from OR: Transported from OR on 6-10 L/min O2 by face mask with adequate spontaneous ventilation    Post pain: adequate analgesia    Post assessment: no apparent anesthetic complications and tolerated procedure well    Post vital signs: stable    Level of consciousness: awake and alert    Nausea/Vomiting: no nausea/vomiting    Complications: none    Transfer of care protocol was followed      Last vitals:   Visit Vitals  BP (!) 146/68   Pulse 74   Temp 36.7 °C (98 °F)   Resp 20   Ht 5' 3" (1.6 m)   Wt 118.4 kg (261 lb)   SpO2 98%   Breastfeeding No   BMI 46.23 kg/m²     "

## 2023-01-17 NOTE — ANESTHESIA PREPROCEDURE EVALUATION
01/17/2023  Sanjana Queen is a 42 y.o., female   Pre-operative evaluation for Procedure(s) (LRB):  RETRO PUBIC SLING, MIDURETHRAL (N/A)    Sanjana Queen is a 42 y.o. female     Patient Active Problem List   Diagnosis    Mild intermittent asthma with acute exacerbation    GERD (gastroesophageal reflux disease)    Class 3 severe obesity due to excess calories without serious comorbidity with body mass index (BMI) of 45.0 to 49.9 in adult    Hypothyroid    Sinus tachycardia    Depression with anxiety    Other constipation    Type 2 diabetes mellitus without complication, without long-term current use of insulin    Hyperlipidemia due to type 2 diabetes mellitus    Fibroadenoma of left breast    Hypertension associated with diabetes    Restless leg syndrome    IgG deficiency    PCOS (polycystic ovarian syndrome)    Stress incontinence    Pelvic floor dysfunction       Review of patient's allergies indicates:   Allergen Reactions    Doxycycline Anaphylaxis and Other (See Comments)       No current facility-administered medications on file prior to encounter.     Current Outpatient Medications on File Prior to Encounter   Medication Sig Dispense Refill    buPROPion (WELLBUTRIN XL) 300 MG 24 hr tablet Take 1 tablet (300 mg total) by mouth once daily. 90 tablet 3    furosemide (LASIX) 40 MG tablet TAKE ONE AND ONE-HALF TABLETS DAILY AS NEEDED FOR SWELLING 135 tablet 3    HIZENTRA 10 gram/50 mL (20 %) Soln Inject 28 mg into the skin once a week. Fri      Lactobac no.41/Bifidobact no.7 (PROBIOTIC-10 ORAL)       lifitegrast (XIIDRA) 5 % Dpet Apply 1 drop to eye every 12 (twelve) hours. 180 each 4    linaCLOtide (LINZESS) 290 mcg Cap capsule Take 1 capsule (290 mcg total) by mouth once daily. 90 capsule 3    olopatadine (PAZEO) 0.7 % Drop Place 1 drop into both eyes once daily.  2.5 mL 12    pantoprazole (PROTONIX) 40 MG tablet Take 1 tablet (40 mg total) by mouth once daily. 90 tablet 3    rosuvastatin (CRESTOR) 20 MG tablet Take 1 tablet (20 mg total) by mouth once daily. 90 tablet 3    semaglutide (OZEMPIC) 2 mg/dose (8 mg/3 mL) PnIj Inject 2 mg into the skin every 7 days. (Patient taking differently: Inject 2 mg into the skin every 7 days. Fri) 9 mL 3    sertraline (ZOLOFT) 100 MG tablet Take 1 tablet (100 mg total) by mouth once daily. 90 tablet 3    tiZANidine (ZANAFLEX) 4 MG tablet Take 4 mg by mouth every 6 (six) hours as needed.      valsartan (DIOVAN) 160 MG tablet Take 1 tablet (160 mg total) by mouth once daily. (Patient taking differently: Take 160 mg by mouth every evening.) 90 tablet 3    verapamiL (VERELAN) 120 MG C24P Take 1 capsule (120 mg total) by mouth once daily. (Patient taking differently: Take 120 mg by mouth every evening.) 90 capsule 3    triamcinolone acetonide 0.1% (KENALOG) 0.1 % cream MARQUEZ AA BID  0       Past Surgical History:   Procedure Laterality Date    BLADDER SURGERY      BREAST LUMPECTOMY Left 11/12/2021    DILATION AND CURETTAGE OF UTERUS      ENDOMETRIAL ABLATION      FRACTURE SURGERY      gastric sleeve N/A     HERNIA REPAIR  2/1/2020    Dr Luu    hernia repair 2/2020      spleen surgery      TONSILLECTOMY      TUBAL LIGATION       2D Echo:  Results for orders placed or performed during the hospital encounter of 08/03/15   2D Echo w/ Color Flow Doppler   Result Value Ref Range    EF + QEF 60 55 - 65    Diastolic Dysfunction No            Pre-op Assessment    I have reviewed the Patient Summary Reports.     I have reviewed the Nursing Notes. I have reviewed the NPO Status.   I have reviewed the Medications.     Review of Systems  Anesthesia Hx:  No problems with previous Anesthesia  History of prior surgery of interest to airway management or planning: Denies Family Hx of Anesthesia complications.   Denies Personal Hx of  Anesthesia complications.   Social:  No Alcohol Use, Non-Smoker, Former Smoker    Hematology/Oncology:  Hematology Normal   Oncology Normal     EENT/Dental:EENT/Dental Normal   Cardiovascular:   Exercise tolerance: good Hypertension    Pulmonary:   Asthma moderate and asymptomatic    Renal/:  Renal/ Normal     Hepatic/GI:   GERD    Neurological:   Neuromuscular Disease,    Endocrine:   Diabetes, type 2 Hypothyroidism  Morbid Obesity / BMI > 40  Psych:   anxiety depression          Physical Exam  General: Well nourished, Cooperative, Alert and Oriented    Airway:  Mallampati: III   Mouth Opening: Normal  TM Distance: Normal  Tongue: Normal  Neck ROM: Normal ROM    Dental:  Intact    Chest/Lungs:  Clear to auscultation, Normal Respiratory Rate    Heart:  Rate: Normal  Rhythm: Regular Rhythm        Anesthesia Plan  Type of Anesthesia, risks & benefits discussed:    Anesthesia Type: Gen ETT  Intra-op Monitoring Plan: Standard ASA Monitors  Post Op Pain Control Plan: multimodal analgesia and IV/PO Opioids PRN  Induction:  IV  Airway Plan: Direct, Post-Induction  Informed Consent: Informed consent signed with the Patient and all parties understand the risks and agree with anesthesia plan.  All questions answered. Patient consented to blood products? No  ASA Score: 3  Day of Surgery Review of History & Physical: H&P Update referred to the surgeon/provider.    Ready For Surgery From Anesthesia Perspective.     .

## 2023-01-17 NOTE — PATIENT INSTRUCTIONS
Post Op Instructions for Midurethral Sling Surgery    ACTIVITY  The first six weeks is a critical time period for wound healing.    After receiving an anesthetic you may feel sleepy or nauseated. It is best to have little activity for the first 24-48 hours but we do encourage walking multiple times throughout the day  No heavy lifting anything greater than 10 pounds (about a gallon of milk) for 4 weeks  Pelvic rest (no sexual intercourse, douching or tampons) for 6 weeks.  Avoid riding a bicycle or putting similar pressure over this area.  No strenuous workouts.  Do not drive for the first 48 hours or if you are taking opioid pain medication.     WOUND CARE INSTRUCTIONS  You may have small incisions that were closed with a medical grade superglue.  You may wash these and pat them dry after 24 hours.  Any vaginal incisions are closed with dissolvable suture that does not need to be removed.  To protect the sling material from infection, do not immerse yourself in water, (i.e. no tub baths, swimming or hot tubbing) for 3 weeks. You may shower.   It is normal to have some light bleeding which should gradually resolve over the next week.  This will look like a light period.    DIET  In the first 24 hours, it is common to experience some nausea, so take clear liquids or a bland diet. Once the nausea has resolved, you may gradually increase to your normal diet.    MEDICATIONS  Begin for over the counter pain medications including ibuprofen or acetaminophen.  Opioid pain medication can be taken if needed but can lead to side effects such as constipation and nausea.   A stool softener (Colace or docusate sodium) is recommended to maintain soft stools after surgery.  If you do not have a bowel movement within 36-48 hours of surgery, take 2 tablespoons of Milk of Magnesia.  If there is still no bowel movement by the next day, take ½ bottle of Magnesium Citrate    WHEN TO CALL THE DOCTOR:  For heavy bleeding (vaginal discharge  like a light period is expected for the 3rd week)  Redness or swelling around the incision.  Fever over 101oF (38.5oC.)  Significant vaginal drainage.   Persistent pain unrelieved by the pain medication.  Leg swelling.  Shortness of breath.  Burning with urination.  Inability to urinate.  Abdominal pain not improving day by day.    FOR EMERGENCIES  If any unusual problems, questions or concerns, please contact your physician or the resident on call at [phone] after hours or during office hours, [phone].

## 2023-01-17 NOTE — PLAN OF CARE
Discharge instructions reviewed with patient and spouse. Verbalizes understanding.  Copies of instructions given to patient. Tolerating po fluids. Denies nausea. States pain is tolerable.  Awaiting patient to void.

## 2023-01-18 RX ORDER — SEMAGLUTIDE 1.34 MG/ML
INJECTION, SOLUTION SUBCUTANEOUS
Refills: 0 | OUTPATIENT
Start: 2023-01-18

## 2023-01-19 ENCOUNTER — PATIENT MESSAGE (OUTPATIENT)
Dept: INTERNAL MEDICINE | Facility: CLINIC | Age: 43
End: 2023-01-19
Payer: COMMERCIAL

## 2023-01-19 RX ORDER — SEMAGLUTIDE 1.34 MG/ML
2 INJECTION, SOLUTION SUBCUTANEOUS
Qty: 2 PEN | Refills: 11 | Status: CANCELLED | OUTPATIENT
Start: 2023-01-19 | End: 2024-01-19

## 2023-01-19 RX ORDER — SEMAGLUTIDE 1.34 MG/ML
2 INJECTION, SOLUTION SUBCUTANEOUS
Qty: 6 PEN | Refills: 3 | Status: SHIPPED | OUTPATIENT
Start: 2023-01-19 | End: 2023-06-16

## 2023-01-19 NOTE — TELEPHONE ENCOUNTER
No new care gaps identified.  [facility] Embedded Care Gaps. Reference number: [id number]. 1/18/2023   6:22:03 PM CST

## 2023-01-19 NOTE — TELEPHONE ENCOUNTER
Refill Decision Note   Sanjana Queen  is requesting a refill authorization.  Brief Assessment and Rationale for Refill:  Quick Discontinue     Medication Therapy Plan:      Pharmacy is requesting new scripts for the following medications without required information, (sig/ frequency/qty/etc)      Medication Reconciliation Completed: No     Comments: Pharmacies have been requesting medications for patients without required information, (sig, frequency, qty, etc.). In addition, requests are sent for medication(s) pt. are currently not taking, and medications patients have never taken.    We have spoken to the pharmacies about these request types and advised their teams previously that we are unable to assess these New Script requests and require all details for these requests. This is a known issue and has been reported.     Note composed:6:27 PM 01/18/2023

## 2023-01-25 ENCOUNTER — TELEPHONE (OUTPATIENT)
Dept: INTERNAL MEDICINE | Facility: CLINIC | Age: 43
End: 2023-01-25
Payer: COMMERCIAL

## 2023-01-25 NOTE — TELEPHONE ENCOUNTER
----- Message from Michele Newman sent at 1/25/2023  1:10 PM CST -----  Contact: Mary coffey/ Billie [phone]  Pharmacy is calling to clarify an RX.  RX name:  semaglutide (OZEMPIC) 1 mg/dose (4 mg/3 mL)  What do they need to clarify:  The dose and directions  Comments: Ref# [id number]

## 2023-02-07 ENCOUNTER — PATIENT MESSAGE (OUTPATIENT)
Dept: UROLOGY | Facility: CLINIC | Age: 43
End: 2023-02-07
Payer: COMMERCIAL

## 2023-02-09 ENCOUNTER — PATIENT MESSAGE (OUTPATIENT)
Dept: PRIMARY CARE CLINIC | Facility: CLINIC | Age: 43
End: 2023-02-09
Payer: COMMERCIAL

## 2023-02-09 RX ORDER — SULFAMETHOXAZOLE AND TRIMETHOPRIM 800; 160 MG/1; MG/1
1 TABLET ORAL 2 TIMES DAILY
Qty: 6 TABLET | Refills: 0 | Status: SHIPPED | OUTPATIENT
Start: 2023-02-09 | End: 2023-03-17

## 2023-02-10 ENCOUNTER — PATIENT MESSAGE (OUTPATIENT)
Dept: PRIMARY CARE CLINIC | Facility: CLINIC | Age: 43
End: 2023-02-10
Payer: COMMERCIAL

## 2023-03-13 ENCOUNTER — PATIENT MESSAGE (OUTPATIENT)
Dept: PRIMARY CARE CLINIC | Facility: CLINIC | Age: 43
End: 2023-03-13
Payer: COMMERCIAL

## 2023-03-13 NOTE — LETTER
March 13, 2023      [facility]  [address]  [facility] [address]       Patient: Sanjana Queen   YOB: 1980      To Whom It May Concern:    Babar Queen  is a patient of mine at [facility].  Her vitamin D level was ordered by me on 12/12/2022 in follow up to a previously elevated level done in 9/2022 (level was 108, upper limit of normal is 82).  This test was medically necessary as it was done to rule out hypervitaminosis D which can have health impacts. If you have any questions or concerns, or if I can be of further assistance, please do not hesitate to contact me.    Sincerely,    Gisselle Tavera MD

## 2023-03-17 ENCOUNTER — OFFICE VISIT (OUTPATIENT)
Dept: UROLOGY | Facility: CLINIC | Age: 43
End: 2023-03-17
Payer: COMMERCIAL

## 2023-03-17 VITALS
DIASTOLIC BLOOD PRESSURE: 92 MMHG | HEART RATE: 79 BPM | BODY MASS INDEX: 48.28 KG/M2 | HEIGHT: 63 IN | SYSTOLIC BLOOD PRESSURE: 159 MMHG | WEIGHT: 272.5 LBS

## 2023-03-17 DIAGNOSIS — N39.3 STRESS INCONTINENCE (FEMALE) (MALE): Primary | ICD-10-CM

## 2023-03-17 PROCEDURE — 99999 PR PBB SHADOW E&M-EST. PATIENT-LVL IV: ICD-10-PCS | Mod: PBBFAC,,, | Performed by: UROLOGY

## 2023-03-17 PROCEDURE — 1159F MED LIST DOCD IN RCRD: CPT | Mod: CPTII,S$GLB,, | Performed by: UROLOGY

## 2023-03-17 PROCEDURE — 99999 PR PBB SHADOW E&M-EST. PATIENT-LVL IV: CPT | Mod: PBBFAC,,, | Performed by: UROLOGY

## 2023-03-17 PROCEDURE — 3077F SYST BP >= 140 MM HG: CPT | Mod: CPTII,S$GLB,, | Performed by: UROLOGY

## 2023-03-17 PROCEDURE — 4010F ACE/ARB THERAPY RXD/TAKEN: CPT | Mod: CPTII,S$GLB,, | Performed by: UROLOGY

## 2023-03-17 PROCEDURE — 3080F PR MOST RECENT DIASTOLIC BLOOD PRESSURE >= 90 MM HG: ICD-10-PCS | Mod: CPTII,S$GLB,, | Performed by: UROLOGY

## 2023-03-17 PROCEDURE — 1159F PR MEDICATION LIST DOCUMENTED IN MEDICAL RECORD: ICD-10-PCS | Mod: CPTII,S$GLB,, | Performed by: UROLOGY

## 2023-03-17 PROCEDURE — 3008F PR BODY MASS INDEX (BMI) DOCUMENTED: ICD-10-PCS | Mod: CPTII,S$GLB,, | Performed by: UROLOGY

## 2023-03-17 PROCEDURE — 4010F PR ACE/ARB THEARPY RXD/TAKEN: ICD-10-PCS | Mod: CPTII,S$GLB,, | Performed by: UROLOGY

## 2023-03-17 PROCEDURE — 3077F PR MOST RECENT SYSTOLIC BLOOD PRESSURE >= 140 MM HG: ICD-10-PCS | Mod: CPTII,S$GLB,, | Performed by: UROLOGY

## 2023-03-17 PROCEDURE — 3008F BODY MASS INDEX DOCD: CPT | Mod: CPTII,S$GLB,, | Performed by: UROLOGY

## 2023-03-17 PROCEDURE — 99024 POSTOP FOLLOW-UP VISIT: CPT | Mod: S$GLB,,, | Performed by: UROLOGY

## 2023-03-17 PROCEDURE — 3080F DIAST BP >= 90 MM HG: CPT | Mod: CPTII,S$GLB,, | Performed by: UROLOGY

## 2023-03-17 PROCEDURE — 99024 PR POST-OP FOLLOW-UP VISIT: ICD-10-PCS | Mod: S$GLB,,, | Performed by: UROLOGY

## 2023-03-17 RX ORDER — MUPIROCIN CALCIUM 20 MG/G
CREAM TOPICAL
COMMUNITY
Start: 2023-03-06

## 2023-03-17 NOTE — PROGRESS NOTES
[facility]      Urology Stress Incontinence Post-operative Note    3/17/2023    Referred by:  No ref. provider found    Procedure: Retropubic mid-urethral synthetic sling   Time Since Procedure: 6-8 weeks    HPI: Sanjana Queen is a very pleasant 42 y.o. female following up for post-operative visit after recent incontinence procedure. She reports pain is well-controlled and narcotic medications have been discontinued. The patient is beginning to return to normal activity. She does not report any voiding difficulty including hesitancy, straining, intermittency, valsalva voiding or incomplete emptying. reports no symptoms of urinary frequency, urgency or urgency incontinence at this time. Stress urinary incontinence, after surgery, has nearly completely resolved with only rare KARSON episodes and no use of pads after returning to a normal level of activity. The patient is quite satisfied with the outcome of surgery.     A review of 10+ systems was conducted with pertinent positive and negative findings documented in HPI with all other systems reviewed and negative.    Past medical, family, surgical and social history reviewed as documented in chart with pertinent positive medical, family, surgical and social history detailed in HPI.    Exam Findings:    Const: no acute distress, conversant and alert  Eyes: anicteric, extraocular muscles intact  ENMT: normocephalic, Nl oral membranes  Cardio: no cyanosis, nl cap refill  Pulm: no tachypnea; no resp distress  Abd: soft, no tenderness  Musc: no laceration, no tenderness  Neuro: alert; oriented x 3  Skin: warm, dry; no petichiae  Psych: no anxiety; normal speech   Urethra: no hypermobility,no lesions or masses  KARSON: no KARSON  Vaginal mucosa: No evidence of sling exposure; incision well-healed        Assessment/Plan:   Stress Urinary Incontinence (established): Patient reports doing well with postoperative pain well-controlled, no signs or symptoms of  new voiding dysfunction or retention, no de darwin symptoms of urinary urgency or frequency, no evidence of sling exposure or hyper-suspension of the mid-urethra, and patient satisfied with control of her urinary incontinence.     Patient will return in 6 months for final postoperative check

## 2023-03-28 ENCOUNTER — PATIENT MESSAGE (OUTPATIENT)
Dept: PRIMARY CARE CLINIC | Facility: CLINIC | Age: 43
End: 2023-03-28
Payer: COMMERCIAL

## 2023-03-28 DIAGNOSIS — M25.562 ACUTE PAIN OF LEFT KNEE: ICD-10-CM

## 2023-03-28 DIAGNOSIS — M54.9 BACK PAIN, UNSPECIFIED BACK LOCATION, UNSPECIFIED BACK PAIN LATERALITY, UNSPECIFIED CHRONICITY: Primary | ICD-10-CM

## 2023-03-28 RX ORDER — CELECOXIB 200 MG/1
200 CAPSULE ORAL 2 TIMES DAILY
Qty: 60 CAPSULE | Refills: 1 | Status: SHIPPED | OUTPATIENT
Start: 2023-03-28 | End: 2023-05-12 | Stop reason: SDUPTHER

## 2023-04-04 ENCOUNTER — OFFICE VISIT (OUTPATIENT)
Dept: PAIN MEDICINE | Facility: CLINIC | Age: 43
End: 2023-04-04
Payer: COMMERCIAL

## 2023-04-04 DIAGNOSIS — M25.562 ACUTE PAIN OF LEFT KNEE: ICD-10-CM

## 2023-04-04 DIAGNOSIS — M72.2 PLANTAR FASCIITIS OF LEFT FOOT: ICD-10-CM

## 2023-04-04 DIAGNOSIS — M53.3 SACROILIAC DYSFUNCTION: Primary | ICD-10-CM

## 2023-04-04 DIAGNOSIS — M54.9 BACK PAIN, UNSPECIFIED BACK LOCATION, UNSPECIFIED BACK PAIN LATERALITY, UNSPECIFIED CHRONICITY: ICD-10-CM

## 2023-04-04 DIAGNOSIS — E66.01 CLASS 3 SEVERE OBESITY DUE TO EXCESS CALORIES WITHOUT SERIOUS COMORBIDITY WITH BODY MASS INDEX (BMI) OF 45.0 TO 49.9 IN ADULT: ICD-10-CM

## 2023-04-04 PROCEDURE — 4010F PR ACE/ARB THEARPY RXD/TAKEN: ICD-10-PCS | Mod: CPTII,S$GLB,, | Performed by: STUDENT IN AN ORGANIZED HEALTH CARE EDUCATION/TRAINING PROGRAM

## 2023-04-04 PROCEDURE — 99999 PR PBB SHADOW E&M-EST. PATIENT-LVL III: CPT | Mod: PBBFAC,,, | Performed by: STUDENT IN AN ORGANIZED HEALTH CARE EDUCATION/TRAINING PROGRAM

## 2023-04-04 PROCEDURE — 1159F PR MEDICATION LIST DOCUMENTED IN MEDICAL RECORD: ICD-10-PCS | Mod: CPTII,S$GLB,, | Performed by: STUDENT IN AN ORGANIZED HEALTH CARE EDUCATION/TRAINING PROGRAM

## 2023-04-04 PROCEDURE — 1160F PR REVIEW ALL MEDS BY PRESCRIBER/CLIN PHARMACIST DOCUMENTED: ICD-10-PCS | Mod: CPTII,S$GLB,, | Performed by: STUDENT IN AN ORGANIZED HEALTH CARE EDUCATION/TRAINING PROGRAM

## 2023-04-04 PROCEDURE — 99999 PR PBB SHADOW E&M-EST. PATIENT-LVL III: ICD-10-PCS | Mod: PBBFAC,,, | Performed by: STUDENT IN AN ORGANIZED HEALTH CARE EDUCATION/TRAINING PROGRAM

## 2023-04-04 PROCEDURE — 4010F ACE/ARB THERAPY RXD/TAKEN: CPT | Mod: CPTII,S$GLB,, | Performed by: STUDENT IN AN ORGANIZED HEALTH CARE EDUCATION/TRAINING PROGRAM

## 2023-04-04 PROCEDURE — 99204 OFFICE O/P NEW MOD 45 MIN: CPT | Mod: S$GLB,,, | Performed by: STUDENT IN AN ORGANIZED HEALTH CARE EDUCATION/TRAINING PROGRAM

## 2023-04-04 PROCEDURE — 1159F MED LIST DOCD IN RCRD: CPT | Mod: CPTII,S$GLB,, | Performed by: STUDENT IN AN ORGANIZED HEALTH CARE EDUCATION/TRAINING PROGRAM

## 2023-04-04 PROCEDURE — 99204 PR OFFICE/OUTPT VISIT, NEW, LEVL IV, 45-59 MIN: ICD-10-PCS | Mod: S$GLB,,, | Performed by: STUDENT IN AN ORGANIZED HEALTH CARE EDUCATION/TRAINING PROGRAM

## 2023-04-04 PROCEDURE — 1160F RVW MEDS BY RX/DR IN RCRD: CPT | Mod: CPTII,S$GLB,, | Performed by: STUDENT IN AN ORGANIZED HEALTH CARE EDUCATION/TRAINING PROGRAM

## 2023-04-04 NOTE — PROGRESS NOTES
Chronic Pain - New Consult    Referring Physician: Gisselle Tavera MD    Date: 04/04/2023     Re: Sanjana Queen  MR#: [id number]  YOB: 1980  Age: 42 y.o.    Chief Complaint: No chief complaint on file.    **This note is dictated using the M*Modal Fluency Direct word recognition program. There are word recognition mistakes that are occasionally missed on review.**    ASSESSMENT: 42 y.o. year old female with left sided low back/SI pain, consistent with     1. Sacroiliac dysfunction  Ambulatory referral/consult to Physical/Occupational Therapy      2. Back pain, unspecified back location, unspecified back pain laterality, unspecified chronicity  Ambulatory referral/consult to Pain Clinic      3. Acute pain of left knee  Ambulatory referral/consult to Pain Clinic      4. Plantar fasciitis of left foot  Ambulatory referral/consult to Physical/Occupational Therapy      5. Class 3 severe obesity due to excess calories without serious comorbidity with body mass index (BMI) of 45.0 to 49.9 in adult          PLAN:      Left sacroiliac dysfunction  -PT referral to work on plantar fasciitis and SI dysfunction  -if not helpful then would do US guided TPI to better target the deep musculature and the iliolumbar/SI ligaments  -could consider SIJ injection as well, but defer until other treatments  -defer additional imaging unless it does not improve.  -anticipate that this will improve after another 4-6 weeks of conservative therapy if her walking improves  -continue BID celebrex for 1 month, then PRN    Left plantar fasciitis  -likely causing altered gait that has caused SI ligament dysfunction  -discussed importance of continued therapy, ice massage, stretching    Obesity  -Today we had a long discussion regarding the etiology of the patient's pain including how weight affects the biomechanics of the spine and joints.  We discussed the importance of maintaining a strong core, staying active, and losing  "weight.  We discussed that exercise and weight loss with not reverse any of the damage that has already occurred, but it can help slow progression and relieve pressure off the affected areas.      - RTC 6 weeks  - Counseled patient regarding the importance of weight loss and activity modification and physical therapy.    The above plan and management options were discussed at length with patient. Patient is in agreement with the above and verbalized understanding. It will be communicated with the referring physician via electronic record, fax, or mail.  Lab/study reports reviewed were important and necessary because subsequent medical and treatment recommendations required review of the above lab/study reports. Images viewed/reviewed above were important and necessary because subsequent medical and treatment recommendations required review of the reviewed image(s).     Electronically signed by:  Murphy Haines DO  04/04/2023    =========================================================================================================    SUBJECTIVE:    Sanjana Queen is a 42 y.o. female presents to the clinic for the evaluation of lower back (L) pain. The pain started 5 weeks + ago following no inciting event and symptoms have been worsening.  The patient has plantar fasciiits in the left foot which has been ongoing for about a year and she thinks that may have exacerbated things in her low back. The pain in the foot getting out of bed in the morning is terrible.  She is seeing ortho and they gave her a steroid shot which helped for a few months.    The pain in the left side of the low back.  It isn't there all the time, but if she moves a certain way it will "grab" and it shoots down the side of her leg to the ankle.  When it happens it will happen happen and resolve spontaneously.  She has been doing home stretches/massage which maybe help.    She started on celebrex which helps some.      Pain " Description:    The pain is located in the lower back left side area and radiates to the left knee and down into the foot .    At BEST  4/10   At WORST  7/10 on the WORST day.    On average pain is rated as 6/10.   Today the pain is rated as 6/10  The pain is continuous.  The pain is described as sharp    Symptoms interfere with daily activity, sleeping, and work.   Exacerbating factors: daily activity .    Mitigating factors medications.   She reports 7 hours of sleep per night.    Physical Therapy/Home Exercise: Yes, currently has a home exercise program    Current Pain Medications:    - celebrex BID, tizanidine 4mg qhs    Failed Pain Medications:    - ibuprofen (stomach issues)    Pain Treatment Therapies:    Pain procedures: steroid shot in the left plantar fascia, and right ankle  Physical Therapy: not for the back or feet  Chiropractor: none  Acupuncture: none  TENS unit: none  Spinal decompression: none  Joint replacement: none    Patient denies urinary incontinence, bowel incontinence, significant motor weakness, and loss of sensations.  Patient denies any suicidal or homicidal ideations     report:  Reviewed and consistent with medication use as prescribed.    Imaging: N/A    Past Medical History:   Diagnosis Date    Anemia     Asthma     Cardiomyopathy, peripartum     GERD (gastroesophageal reflux disease)     Hypertension     Hypothyroidism     not at present    Morbid obesity     Peripartum cardiomyopathy 7/28/2015    Snoring      Past Surgical History:   Procedure Laterality Date    BLADDER SURGERY      BREAST LUMPECTOMY Left 11/12/2021    DILATION AND CURETTAGE OF UTERUS      ENDOMETRIAL ABLATION      FRACTURE SURGERY      gastric sleeve N/A     HERNIA REPAIR  2/1/2020    Dr Luu    hernia repair 2/2020      INSERTION OF MIDURETHRAL SLING N/A 1/17/2023    Procedure: RETRO PUBIC SLING, MIDURETHRAL;  Surgeon: Sam Rice MD;  Location: [facility] OR [address] [place] [address];  Service: Urology;  Laterality: N/A;   90 min/ RETROPUBIC    spleen surgery      TONSILLECTOMY      TUBAL LIGATION       Social History     Socioeconomic History    Marital status:    Tobacco Use    Smoking status: Former     Packs/day: 1.00     Years: 15.00     Pack years: 15.00     Types: Cigarettes     Start date: 3/1/1998     Quit date: 2018     Years since quittin.5    Smokeless tobacco: Never   Substance and Sexual Activity    Alcohol use: No    Drug use: No    Sexual activity: Yes     Partners: Male     Birth control/protection: See Surgical Hx     Comment: Uterine Ablation and Tubes removed     Social Determinants of Health     Financial Resource Strain: Medium Risk    Difficulty of Paying Living Expenses: Somewhat hard   Food Insecurity: No Food Insecurity    Worried About Running Out of Food in the Last Year: Never true    Ran Out of Food in the Last Year: Never true   Transportation Needs: No Transportation Needs    Lack of Transportation (Medical): No    Lack of Transportation (Non-Medical): No   Physical Activity: Insufficiently Active    Days of Exercise per Week: 2 days    Minutes of Exercise per Session: 20 min   Stress: Stress Concern Present    Feeling of Stress : To some extent   Social Connections: Unknown    Frequency of Communication with Friends and Family: More than three times a week    Frequency of Social Gatherings with Friends and Family: More than three times a week    Active Member of Clubs or Organizations: No    Attends Club or Organization Meetings: Never    Marital Status:    Housing Stability: Low Risk     Unable to Pay for Housing in the Last Year: No    Number of Places Lived in the Last Year: 1    Unstable Housing in the Last Year: No     Family History   Problem Relation Age of Onset    Diabetes Mother     Arthritis Mother         Ankles    Hypertension Mother     Diabetes Father     Hypertension Father     Heart disease Father     Early death Father         65 years of age heart Failure     Hypertension Maternal Grandmother     Cancer Maternal Grandmother     Hypertension Maternal Grandfather     Heart disease Maternal Grandfather     Hypertension Sister     Diabetes Sister     Cancer Paternal Aunt         Breast Cancer       Review of patient's allergies indicates:   Allergen Reactions    Doxycycline Anaphylaxis and Other (See Comments)       Current Outpatient Medications   Medication Sig    buPROPion (WELLBUTRIN XL) 300 MG 24 hr tablet Take 1 tablet (300 mg total) by mouth once daily.    celecoxib (CELEBREX) 200 MG capsule Take 1 capsule (200 mg total) by mouth 2 (two) times daily.    furosemide (LASIX) 40 MG tablet TAKE ONE AND ONE-HALF TABLETS DAILY AS NEEDED FOR SWELLING    HIZENTRA 10 gram/50 mL (20 %) Soln Inject 28 mg into the skin once a week. Fri    Lactobac no.41/Bifidobact no.7 (PROBIOTIC-10 ORAL)     lifitegrast (XIIDRA) 5 % Dpet Apply 1 drop to eye every 12 (twelve) hours.    linaCLOtide (LINZESS) 290 mcg Cap capsule Take 1 capsule (290 mcg total) by mouth once daily.    mupirocin calcium 2% (BACTROBAN) 2 % cream     olopatadine (PAZEO) 0.7 % Drop Place 1 drop into both eyes once daily.    pantoprazole (PROTONIX) 40 MG tablet Take 1 tablet (40 mg total) by mouth once daily.    rosuvastatin (CRESTOR) 20 MG tablet Take 1 tablet (20 mg total) by mouth once daily.    semaglutide (OZEMPIC) 1 mg/dose (4 mg/3 mL) Inject 2 mg into the skin every 7 days.    sertraline (ZOLOFT) 100 MG tablet Take 1 tablet (100 mg total) by mouth once daily.    tiZANidine (ZANAFLEX) 4 MG tablet Take 4 mg by mouth every 6 (six) hours as needed.    triamcinolone acetonide 0.1% (KENALOG) 0.1 % cream MARQUEZ AA BID    valsartan (DIOVAN) 160 MG tablet Take 1 tablet (160 mg total) by mouth once daily. (Patient taking differently: Take 160 mg by mouth every evening.)    verapamiL (VERELAN) 120 MG C24P Take 1 capsule (120 mg total) by mouth once daily. (Patient taking differently: Take 120 mg by mouth every evening.)     No  current facility-administered medications for this visit.       REVIEW OF SYSTEMS:    GENERAL:  No weight loss, malaise or fevers.  HEENT:   No recent changes in vision or hearing  NECK:  Negative for lumps, no difficulty with swallowing.  RESPIRATORY:  Negative for cough, wheezing or shortness of breath, patient denies any recent URI. + shortness of breath  CARDIOVASCULAR:  Negative for chest pain, leg swelling or palpitations.  GI:  Negative for abdominal discomfort, blood in stools or black stools or change in bowel habits.  MUSCULOSKELETAL:  See HPI.  SKIN:  Negative for lesions, rash, and itching.  PSYCH:  No mood disorder or recent psychosocial stressors.  Patients sleep is not disturbed secondary to pain.  HEMATOLOGY/LYMPHOLOGY:  Negative for prolonged bleeding, bruising easily or swollen nodes.  Patient is not currently taking any anti-coagulants  NEURO:   No history of headaches, syncope, paralysis, seizures or tremors. + headaches   All other reviewed and negative other than HPI.    OBJECTIVE:    BP (!) (P) 168/98   Pulse (P) 80     PHYSICAL EXAMINATION:    GENERAL: Well appearing, in no acute distress, alert and oriented x3.  PSYCH:  Mood and affect appropriate.  SKIN: Skin color, texture, turgor normal, no rashes or lesions.  HEAD/FACE:  Normocephalic, atraumatic. Cranial nerves grossly intact.  CV: RRR with palpation of the radial artery.  PULM: CTAB. No evidence of respiratory difficulty, symmetric chest rise.  GI:  Soft and non-tender.    BACK:   - No obvious deformity or signs of trauma, Normal lumbar lordotic curve  - Negative spinous process tenderness  - Negative paravertebral tenderness  - Negative pain to palpation over the facet joints of the lumbar spine.   - Positive QL / Iliac crest / Glut tenderness. Left iliac crest/iliolumbar ligaments  - Slump test is Negative for radicular pain  - Slump test is Negative for back pain  - Supine Straight leg raising is Negative for radicular pain  -  Supine Straight leg raising is Negative for back pain  - Lumbar ROM is normal in Flexion without pain  - Lumbar ROM is normal in Extension without pain  - Lumbar ROM is normal in Lateral Flexion with pain  - Lumbar ROM is normal in Rotation without pain  - Positive Sustained Hip Flexion test (for discogenic pain)  - Negative Altered Gait, Posture  - Axial facet loading test Negative on the bilateral side(s)    SI Joint exam:  - Positive SI joint tenderness to palpation  - Nas's sign Negative  - Yeoman's Test: Did not perform for SI joint pain indicating anterior SI ligament involvement. Did not perform for anterior thigh pain/paresthesia which indicates femoral nerve stretch.  - Gaenslen's Test:Positive  - Finger Grecia's Sign:Positive  - SI compression test:Negative  - SI distraction test:Negative  - Thigh Thrust: Negative  - SI Thrust: Did not perform    MUSKULOSKELETAL:    EXTREMITIES:   Hip Exam:  - Log Roll Negative  - FADIR Negative  - Stinchfield Negative  - Hip Scour Negative  - GTB Tenderness Negative      MUSCULOSKELETAL:  No atrophy or tone abnormalities are noted in the UE or LE.  No deformities, edema, or skin discoloration are noted on visible skin. Good capillary refill.    NEURO: Bilateral upper and lower extremity coordination and muscle stretch reflexes are physiologic and symmetric.      NEUROLOGICAL EXAM:  MENTAL STATUS: A x O x 3, good concentration, speech is fluent and goal directed  MEMORY: recent and remote are intact  CN: CN2-12 grossly intact  MOTOR: 5/5 in all muscle groups  DTRs: 0+ intact symmetric  Sensation:    -no Loss of sensation in a left lower and right lower L-1, L-2, L-3, L-4, and L-5 bilaterally distribution.  Babinski: absent on the bilateral side(s)  Hanson: absent on the bilateral side(s)  Clonus: absent on the bilateral side(s)    GAIT: normal.

## 2023-04-06 ENCOUNTER — CLINICAL SUPPORT (OUTPATIENT)
Dept: REHABILITATION | Facility: HOSPITAL | Age: 43
End: 2023-04-06
Attending: STUDENT IN AN ORGANIZED HEALTH CARE EDUCATION/TRAINING PROGRAM
Payer: COMMERCIAL

## 2023-04-06 DIAGNOSIS — M25.562 CHRONIC PAIN OF LEFT KNEE: ICD-10-CM

## 2023-04-06 DIAGNOSIS — G89.29 CHRONIC PAIN OF LEFT KNEE: ICD-10-CM

## 2023-04-06 DIAGNOSIS — M72.2 PLANTAR FASCIITIS OF LEFT FOOT: ICD-10-CM

## 2023-04-06 DIAGNOSIS — M25.552 PAIN IN LEFT HIP: ICD-10-CM

## 2023-04-06 DIAGNOSIS — M53.3 SACROILIAC DYSFUNCTION: ICD-10-CM

## 2023-04-06 DIAGNOSIS — M79.672 PAIN IN LEFT FOOT: ICD-10-CM

## 2023-04-06 DIAGNOSIS — M54.50 ACUTE LEFT-SIDED LOW BACK PAIN WITHOUT SCIATICA: ICD-10-CM

## 2023-04-06 PROCEDURE — 97162 PT EVAL MOD COMPLEX 30 MIN: CPT

## 2023-04-06 PROCEDURE — 97112 NEUROMUSCULAR REEDUCATION: CPT

## 2023-04-06 PROCEDURE — 97110 THERAPEUTIC EXERCISES: CPT

## 2023-04-06 NOTE — PLAN OF CARE
[facility]  Physical Therapy Initial Evaluation    Date: 4/6/2023   Name: Sanjana [facility]  Clinic Number: [id number]    Therapy Diagnosis:   Encounter Diagnoses   Name Primary?    Sacroiliac dysfunction     Plantar fasciitis of left foot      Physician: Murphy Haines,*    Physician Orders: PT Eval and Treat   Medical Diagnosis from Referral:     M53.3 (ICD-10-CM) - Sacroiliac dysfunction   M72.2 (ICD-10-CM) - Plantar fasciitis of left foot     Evaluation Date: 4/6/2023  Authorization Period Expiration: 04/03/2024  Plan of Care Expiration: 7/6/2023  Visit # / Visits authorized: 1/ 1    Time In: 1000  Time Out: 1100  Total Appointment Time (timed & untimed codes): 60 minutes    Precautions: Standard    Subjective   Date of onset: see below  History of current condition - Sanjana reports:     L > R plantar fasciitis:   - >1 year ago onset  - had injections last year minimal resolution (5 weeks returned and worsening now)  - R side resolved after injections  - continues to do stretching without much relief  - burns when aggravated even in sitting  - PMH L ankle surgery (possible Broström)  - Has tried Celebrex from  which helped. Started taking this for about 3 weeks at morning and night, which reduces discomfort.    LBP / SIJ:   - ~ 5 weeks ago  - recent onset  - certain standing / sitting positions, but not consistent  - grabs / pulls and runs down outside of leg  - sharp shooting pain  - denies N/T   - stretching of low back has minimal relief   - 0-6/10 pain levels    L knee:   - ~6 months ago  - Patellar dislocation; has not been evaluated for this  - aggs - stairs up or down  - walking does not aggravate       Medical History:   Past Medical History:   Diagnosis Date    Anemia     Asthma     Cardiomyopathy, peripartum     GERD (gastroesophageal reflux disease)     Hypertension     Hypothyroidism     not at present    Morbid obesity     Peripartum cardiomyopathy  7/28/2015    Snoring        Surgical History:   Snajana Queen  has a past surgical history that includes Tonsillectomy; Fracture surgery; Bladder surgery; spleen surgery; Tubal ligation; Dilation and curettage of uterus; Endometrial ablation; gastric sleeve (N/A); hernia repair 2/2020; Hernia repair (2/1/2020); Breast lumpectomy (Left, 11/12/2021); and Insertion of midurethral sling (N/A, 1/17/2023).    Medications:   Sanjana has a current medication list which includes the following prescription(s): bupropion, celecoxib, furosemide, hizentra, lactobac no.41/bifidobact no.7, lifitegrast, linaclotide, mupirocin calcium 2%, pazeo, pantoprazole, rosuvastatin, ozempic, sertraline, tizanidine, triamcinolone acetonide 0.1%, valsartan, and verapamil.    Allergies:   Review of patient's allergies indicates:   Allergen Reactions    Doxycycline Anaphylaxis and Other (See Comments)      Imaging: none    Prior Therapy: none  Social History: lives with their family  Occupation: see intake  Prior Level of Function: chronic  Current Level of Function: pain with walking, stairs, affecting ADLs daily    Pain:  Current 5/10, worst 8/10, best 3/10   Location: { back - lumbar, left foot/feet , left hip(s), and left knee(s)   Description: Aching, Dull, Burning, Sharp, Shooting, and Variable  Aggravating Factors: Sitting, Standing, Walking, and Stairs  Easing Factors: nothing, rest, and NSAID    Pts goals: reduce pain in foot and low back primarily to improve santino to ADLs    Objective     Observation / Gait: excessive lateral sway in stance phases with bilateral hip drop.    Posture: endomorphic build    Palpation: TTP plantar fascial insertion, L ITB, L gluteals; bilateral lateral patellar tilt    Sensation: WNL    Range of Motion:   Ankle Right Left   1st MTP Ext WFL WFL * pain   1st MTP Flexion WFL WFL   DF ([facility]) np np   DF ([facility]) 15 deg 10 deg   Plantarflexion WNL WNL   Inversion  excessive excessive   Eversion excessive  excessive      Strength:  Ankle Right Left   1st MTP Ext 4+/5 3+/5   1st MTP Flexion 4+/5 4/5 * pain   Dorsiflexion 5/5 5/5   Plantarflexion np np   Inversion 4/5 4-/5   Eversion 4+/5 4-/5     Joint Mobility:   Ankle Right Left   A/P TCJ hypo WNL   A/P Fibular head WNL WNL * pain     Special Tests:  Anterior Drawer + on R   Talar tilt np   Squeeze test np   Windlass test + on L (high spc - fascitis)   Sánchez np       Range of Motion (Passive):   Knee Right  Left    Flexion WNL WNL   Extension WNL WNL     Range of Motion (Active):   Knee Right  Left    Flexion WNL WNL   Extension WNL WNL     Special Tests Knee:   Right Left   Valgus Stress Test - -   Varus Stress Test - -   Anterior Drawer - -   Posterior Drawer at 30 deg (PLC) / 90 deg (PCL) - -   Patellar Grind Test - +     Hip Range of Motion:  WNL for all, but some pain with IR in hip and back    Special Tests Hip:   FABERs:  - stretch   NICCI: - for hip pain, but low back pain  Assisted SLR (SIJ stability): -    Lower Extremity Strength:  Right LE  Left LE    Quadriceps: SLR - WFL Quadriceps: SLR - WFL   Hamstrings: 4+/5 Hamstrings: 3+/5   Hip Extension:  4/5 Hip Extension: 3+/5   Hip ABD: 4-/5 Hip ABD: 3/5     Flexibility:    90/90 Hamstrings: R = - ; L = -    Enedelia's test: R = - ; L = +   Prone knee bend (RF): R = +; L = +++   Adductors: syl +    Other:  L/S ROM - WNL with slight pull and discomfort coming from flexion to extension       Limitation/Restriction for FOTO LE Survey    Therapist reviewed FOTO scores for Sanjana Queen on 4/6/2023.   FOTO documents entered into [employer] ([email or web address]) - see Media section.    Limitation Score: see media%         TREATMENT   Treatment Time In: 1035  Treatment Time Out: 1100  Total Treatment time (time-based codes) separate from Evaluation: 25 minutes    Sanjana received therapeutic exercises to develop strength, endurance, ROM, flexibility, posture, and core stabilization for 15 minutes including:    Patient education:  - Ankle:  strength / flexibility / inflammation management  - SIJ: hip strength / stability / hip flexibility    Knee to chest stretch 2x30 sec  Knee across chest stretch 2x30 sec  Butterfly stretch 2x30 sec    Plantar fasciitis stretch towel 2x30 sec      NEXT VISITS - Towel scrunch / Theraband for ankle / DN / prone RF / core      Sanjana participated in neuromuscular re-education activities to improve: Balance, Coordination, Kinesthetic, Sense, Proprioception, and Posture for 10 minutes. The following activities were included:    Foot:  Toe yoga 20x5 sec ea    Hip:  Glute squeeze w/ heel kiss 5x10 sec  Bridge ER w/ heel kiss 7x5 sec      Home Exercises and Patient Education Provided    Education provided:   - see above    Written Home Exercises Provided: yes.  Exercises were reviewed and patient was able to demonstrate them prior to the end of the session.  Patient demonstrated good  understanding of the education provided.     See EMR under Patient Instructions for exercisesprovided 4/6/2023.    Assessment   Sanjana is a 42 y.o. female referred to outpatient Physical Therapy with a medical diagnosis of M53.3 (ICD-10-CM) - Sacroiliac dysfunction and M72.2 (ICD-10-CM) - Plantar fasciitis of left foot. Pt presents with multiple locations of pain, decreased flexibility of foot/ankle musculature and hips, along with core / hip / ankle / foot strength and stability deficits. Impairments contributory to chronic L foot pain with weightbearing activities and recent onset of L SIJ / low back pain.    Pt prognosis is Fair.   Pt will benefit from skilled outpatient Physical Therapy to address the deficits stated above and in the chart below, provide pt/family education, and to maximize pt's level of independence.     Plan of care discussed with patient: Yes  Pt's spiritual, cultural and educational needs considered and patient is agreeable to the plan of care and goals as stated below:     Anticipated Barriers for therapy: BMI and  chronicity    Medical Necessity is demonstrated by the following  History  Co-morbidities and personal factors that may impact the plan of care Co-morbidities:   BMI and chronicity    Personal Factors:   no deficits     moderate   Examination  Body Structures and Functions, activity limitations and participation restrictions that may impact the plan of care Body Regions:   back  lower extremities    Body Systems:    ROM  strength  balance  gait  transfers  transitions  motor control  motor learning  edema  scar formation    Participation Restrictions:   covid-19    Activity limitations:   Learning and applying knowledge  no deficits    General Tasks and Commands  no deficits    Communication  no deficits    Mobility  walking    Self care  no deficits    Domestic Life  no deficits    Interactions/Relationships  no deficits    Life Areas  no deficits    Community and Social Life  no deficits         high   Clinical Presentation evolving clinical presentation with changing clinical characteristics moderate   Decision Making/ Complexity Score: moderate     GOALS: Short Term Goals:  0-4 weeks  1.Report decreased Foot pain  < / =  4/10  to increase tolerance for ADLs  2. Increase ROM by 5-10 degrees where limited in order to perform ADLs without difficulty.  3. Increase strength by 1/3 MMT grade in intrinsics and hips to increase tolerance for ADL and work activities.  4. Pt to tolerate HEP to improve ROM and independence with ADL's    Long Term Goals: 5-8 weeks  1.Report decreased Foot and Low back pain < / = 2/10  to increase tolerance for ADLs  2.Patient goal: ADLs without complaint  3.Increase strength to 4+/5 in  intrinsics and hips  to increase tolerance for ADL and work activities.  4. Pt will report at [facility] on FOTO  to demonstrate increase in LE function with every day tasks.     Plan   Plan of care Certification: 4/6/2023 to 7/6/2023.    Outpatient Physical Therapy 1 times weekly for 8 weeks to include the  following interventions: Gait Training, Manual Therapy, Moist Heat/ Ice, Neuromuscular Re-ed, Patient Education, Self Care, Therapeutic Activities, Therapeutic Exercise, and DN.     VICKY GARCIA, PT, DPT, OCS

## 2023-04-10 ENCOUNTER — CLINICAL SUPPORT (OUTPATIENT)
Dept: REHABILITATION | Facility: HOSPITAL | Age: 43
End: 2023-04-10
Payer: COMMERCIAL

## 2023-04-10 DIAGNOSIS — G89.29 CHRONIC PAIN OF LEFT KNEE: ICD-10-CM

## 2023-04-10 DIAGNOSIS — M79.672 PAIN IN LEFT FOOT: Primary | ICD-10-CM

## 2023-04-10 DIAGNOSIS — M54.50 ACUTE LEFT-SIDED LOW BACK PAIN WITHOUT SCIATICA: ICD-10-CM

## 2023-04-10 DIAGNOSIS — M25.552 PAIN IN LEFT HIP: ICD-10-CM

## 2023-04-10 DIAGNOSIS — M25.562 CHRONIC PAIN OF LEFT KNEE: ICD-10-CM

## 2023-04-10 PROCEDURE — 97140 MANUAL THERAPY 1/> REGIONS: CPT

## 2023-04-10 PROCEDURE — 97014 ELECTRIC STIMULATION THERAPY: CPT

## 2023-04-10 PROCEDURE — 97110 THERAPEUTIC EXERCISES: CPT

## 2023-04-10 NOTE — PROGRESS NOTES
Physical Therapy Daily Treatment Note     Name: Sanjana [facility]  Clinic Number: [id number]    Therapy Diagnosis:   Encounter Diagnoses   Name Primary?    Pain in left foot Yes    Chronic pain of left knee     Pain in left hip     Acute left-sided low back pain without sciatica      Physician: Murphy Haines,*    Visit Date: 4/10/2023  Physician Orders: PT Eval and Treat   Medical Diagnosis from Referral:      M53.3 (ICD-10-CM) - Sacroiliac dysfunction   M72.2 (ICD-10-CM) - Plantar fasciitis of left foot      Evaluation Date: 4/6/2023  Authorization Period Expiration: 04/03/2024  Plan of Care Expiration: 7/6/2023  Visit # / Visits authorized: 1/ 1, 1/20     Time In: 1300  Time Out: 1400  Total Appointment Time (timed & untimed codes): 60 minutes     Precautions: Standard    Subjective     Pt reports: L side of low back and hip were flared up this weekend. Feels like it could be from exercises, but also was very active this weekend. Min changes with foot pain.    She was compliant with home exercise program.  Response to previous treatment: increased LBP  Functional change: no changes    Pain: 5/10  Location: left back , buttocks , feet , and knee      Objective     Lower Extremity Strength at IE:  Right LE   Left LE     Quadriceps: SLR - WFL Quadriceps: SLR - WFL   Hamstrings: 4+/5 Hamstrings: 3+/5   Hip Extension:  4/5 Hip Extension: 3+/5   Hip ABD: 4-/5 Hip ABD: 3/5     Ankle Right Left   1st MTP Ext 4+/5 3+/5   1st MTP Flexion 4+/5 4/5 * pain   Dorsiflexion 5/5 5/5   Plantarflexion np np   Inversion 4/5 4-/5   Eversion 4+/5 4-/5     L/S: ROM WNL, but pain with flexed to extended position; hips as on eval    Sanjana received therapeutic exercises to develop strength, endurance, ROM, flexibility, posture, and core stabilization for 30 minutes including:     Patient education:  - Ankle: strength / flexibility / inflammation management  - SIJ: hip strength / stability / hip flexibility  - Adjustments of  HEP - sim, remove butterfly       Knee across chest stretch 3x30 sec ea  Sim stretch 3x30 sec ea  Leg flexion DL / SL 35lbs 2x10-15 reps ea    Plantar fasciitis stretch towel 10x10 sec ea - post DN       NEXT VISITS - Cont HS curl; Theraband for ankle / towel scrunch / DN / core       Held:  Butterfly stretch 3x30 sec - hold  Knee to chest stretch 2x30 sec     Sanjana participated in neuromuscular re-education activities to improve: Balance, Coordination, Kinesthetic, Sense, Proprioception, and Posture for 05 minutes. The following activities were included:     Hip:  Bilateral Glute squeeze w/ heel kiss 10x10 sec  Unilateral Glute squeeze w/ heel kiss 10x10 sec ea      Held:  Bridge ER w/ heel kiss 7x5 sec  Foot:  Toe yoga 20x5 sec ea    Sanjana received the following manual therapy techniques: DN were applied to the: syl ankle / foot for 25 minutes, including:    Assessment of L/S    Education: Benefits of DN    Dry Needling:  Patient educated on intervention of dry needling including risks, benefits, and post administration expectations including potential soreness, bruising, and fatigue. Patient demonstrated verbal understanding and consented to intervention today.    DN performed to bilateral gastroc / soleus for 15 min with manual and electrical stimulation (unattended e-stim).      Home Exercises Provided and Patient Education Provided     Education provided:   - see above    Written Home Exercises Provided: yes.  Exercises were reviewed and Sanjana was able to demonstrate them prior to the end of the session.  Sanjana demonstrated good  understanding of the education provided.     See EMR under Patient Instructions for exercises provided  IE and 4/10/2023 .    Assessment     Patient presents with increased LBP with s/s of muscle TrP or strain with pain coming from flexed to extended position. No ROS with other L/S movements. Following adjustments on hip stretches and HS activation via HS curl machine  patient reported reduced LBP with change of postion.    Regarding plantar pain, patient santino DN well with noted TrP of G+S complex. Improved tissue texture upon completion. Requires cont stretching to help reduce these symptoms.    Cont with focus on DN and stretching primarily for foot and hip stretching / stabilization for L/S.    Sanjana Is progressing well towards her goals.   Pt prognosis is Fair.     Pt will continue to benefit from skilled outpatient physical therapy to address the deficits listed in the problem list box on initial evaluation, provide pt/family education and to maximize pt's level of independence in the home and community environment.     Pt's spiritual, cultural and educational needs considered and pt agreeable to plan of care and goals.    Anticipated barriers to physical therapy: BMI and chronicity    GOALS: Short Term Goals:  0-4 weeks  1.Report decreased Foot pain  < / =  4/10  to increase tolerance for ADLs  2. Increase ROM by 5-10 degrees where limited in order to perform ADLs without difficulty.  3. Increase strength by 1/3 MMT grade in intrinsics and hips to increase tolerance for ADL and work activities.  4. Pt to tolerate HEP to improve ROM and independence with ADL's     Long Term Goals: 5-8 weeks  1.Report decreased Foot and Low back pain < / = 2/10  to increase tolerance for ADLs  2.Patient goal: ADLs without complaint  3.Increase strength to 4+/5 in  intrinsics and hips  to increase tolerance for ADL and work activities.  4. Pt will report at [facility] on FOTO  to demonstrate increase in LE function with every day tasks.     Plan     See POC in treatment section for evaluation    VICKY GARCIA, PT, DPT, OCS

## 2023-04-20 ENCOUNTER — PATIENT MESSAGE (OUTPATIENT)
Dept: PRIMARY CARE CLINIC | Facility: CLINIC | Age: 43
End: 2023-04-20
Payer: COMMERCIAL

## 2023-04-24 ENCOUNTER — CLINICAL SUPPORT (OUTPATIENT)
Dept: REHABILITATION | Facility: HOSPITAL | Age: 43
End: 2023-04-24
Payer: COMMERCIAL

## 2023-04-24 DIAGNOSIS — M25.562 CHRONIC PAIN OF LEFT KNEE: ICD-10-CM

## 2023-04-24 DIAGNOSIS — M79.672 PAIN IN LEFT FOOT: Primary | ICD-10-CM

## 2023-04-24 DIAGNOSIS — M54.50 ACUTE LEFT-SIDED LOW BACK PAIN WITHOUT SCIATICA: ICD-10-CM

## 2023-04-24 DIAGNOSIS — G89.29 CHRONIC PAIN OF LEFT KNEE: ICD-10-CM

## 2023-04-24 DIAGNOSIS — M25.552 PAIN IN LEFT HIP: ICD-10-CM

## 2023-04-24 PROCEDURE — 97110 THERAPEUTIC EXERCISES: CPT

## 2023-04-24 PROCEDURE — 97014 ELECTRIC STIMULATION THERAPY: CPT

## 2023-04-24 PROCEDURE — 97140 MANUAL THERAPY 1/> REGIONS: CPT

## 2023-04-24 NOTE — PROGRESS NOTES
Physical Therapy Daily Treatment Note     Name: Sanjana [facility]  Clinic Number: [id number]    Therapy Diagnosis:   Encounter Diagnoses   Name Primary?    Pain in left foot Yes    Chronic pain of left knee     Pain in left hip     Acute left-sided low back pain without sciatica        Physician: Murphy Haines,*    Visit Date: 4/24/2023  Physician Orders: PT Eval and Treat   Medical Diagnosis from Referral:      M53.3 (ICD-10-CM) - Sacroiliac dysfunction   M72.2 (ICD-10-CM) - Plantar fasciitis of left foot      Evaluation Date: 4/6/2023  Authorization Period Expiration: 04/03/2024  Plan of Care Expiration: 7/6/2023  Visit # / Visits authorized: 1/ 1, 2/20      Time In: 1000  Time Out: 1100  Total Appointment Time (timed & untimed codes): 60 minutes     Precautions: Standard    Subjective     Pt reports: Got relief for about 2-3 days after last visit to hip / foot. Cont to be diligent with HEP.    She was compliant with home exercise program.  Response to previous treatment: relief for 2-3 days  Functional change: no changes    Pain: 5/10  Location: left back , buttocks , feet , and knee      Objective     Lower Extremity Strength at IE:  Right LE   Left LE     Quadriceps: SLR - WFL Quadriceps: SLR - WFL   Hamstrings: 4+/5 Hamstrings: 3+/5   Hip Extension:  4/5 Hip Extension: 3+/5   Hip ABD: 4-/5 Hip ABD: 3/5     Ankle Right Left   1st MTP Ext 4+/5 3+/5   1st MTP Flexion 4+/5 4/5 * pain   Dorsiflexion 5/5 5/5   Plantarflexion np np   Inversion 4/5 4-/5   Eversion 4+/5 4-/5     L/S: ROM WNL, but pain with flexed to extended position; hips as on eval    Sanjana received therapeutic exercises to develop strength, endurance, ROM, flexibility, posture, and core stabilization for 45 minutes including:     Patient education:  - Ankle: strength / flexibility / inflammation management  - SIJ: hip strength / stability / hip flexibility  - Adjustments of HEP     Leg flexion DL / SL 35lbs 3x10-15 reps ea  Knee  across chest stretch 3x30 sec ea  Sim stretch w/ strap 3x30 sec ea  Seated GTB HS curl 10x10 sec - HEP    Plantar fasciitis stretch towel 3x30 sec ea - pre/post DN  GTB peroneals x100 - HEP  GTB post tib x100 - HEP       NEXT VISITS - Cont HS curl / Start Bridge; Theraband for ankle / EHL / towel scrunch / DN / core       Held:  Butterfly stretch 3x30 sec - hold  Knee to chest stretch 2x30 sec     Sanjana participated in neuromuscular re-education activities to improve: Balance, Coordination, Kinesthetic, Sense, Proprioception, and Posture for 00 minutes. The following activities were included:     Held:  Hip:  Bilateral Glute squeeze w/ heel kiss 10x10 sec  Unilateral Glute squeeze w/ heel kiss 10x10 sec ea  Bridge ER w/ heel kiss 7x5 sec  Foot:  Toe yoga 20x5 sec ea    Sanjana received the following manual therapy techniques: DN were applied to the: syl ankle / foot for 15 minutes, including:    Assessment of L/S    Education: Benefits of DN    Dry Needling:  Patient educated on intervention of dry needling including risks, benefits, and post administration expectations including potential soreness, bruising, and fatigue. Patient demonstrated verbal understanding and consented to intervention today.    DN performed to bilateral gastroc / soleus for 15 min with manual and electrical stimulation (unattended e-stim).      Home Exercises Provided and Patient Education Provided     Education provided:   - see above    Written Home Exercises Provided: yes.  Exercises were reviewed and Sanjana was able to demonstrate them prior to the end of the session.  Sanjana demonstrated good  understanding of the education provided.     See EMR under Patient Instructions for exercises provided  IE and 4/10/2023 and 4/24/2023 .    Assessment     Able to reduce L hip / LBP well again following HS curl machine; able to replicate this well with GTB HS curl for HEP.    Was able to get 2-3 days of foot relief following DN. Improved  tissue texture again upon completion. Requires cont stretching to help reduce these symptoms and would benefit from increased focus on peroneal, intrinsic, and post tib activation; HEP adjusted accordingly.    Cont with focus on DN and stretching primarily for foot and hip stretching / stabilization for L/S. Plan to progress as able.    Sanjana Is progressing well towards her goals.   Pt prognosis is Fair.     Pt will continue to benefit from skilled outpatient physical therapy to address the deficits listed in the problem list box on initial evaluation, provide pt/family education and to maximize pt's level of independence in the home and community environment.     Pt's spiritual, cultural and educational needs considered and pt agreeable to plan of care and goals.    Anticipated barriers to physical therapy: BMI and chronicity    GOALS: Short Term Goals:  0-4 weeks  1.Report decreased Foot pain  < / =  4/10  to increase tolerance for ADLs  2. Increase ROM by 5-10 degrees where limited in order to perform ADLs without difficulty.  3. Increase strength by 1/3 MMT grade in intrinsics and hips to increase tolerance for ADL and work activities.  4. Pt to tolerate HEP to improve ROM and independence with ADL's     Long Term Goals: 5-8 weeks  1.Report decreased Foot and Low back pain < / = 2/10  to increase tolerance for ADLs  2.Patient goal: ADLs without complaint  3.Increase strength to 4+/5 in  intrinsics and hips  to increase tolerance for ADL and work activities.  4. Pt will report at [facility] on FOTO  to demonstrate increase in LE function with every day tasks.     Plan     See POC in treatment section for evaluation    VICKY GARCIA, PT, DPT, OCS

## 2023-05-01 ENCOUNTER — CLINICAL SUPPORT (OUTPATIENT)
Dept: REHABILITATION | Facility: HOSPITAL | Age: 43
End: 2023-05-01
Payer: COMMERCIAL

## 2023-05-01 DIAGNOSIS — M25.552 PAIN IN LEFT HIP: ICD-10-CM

## 2023-05-01 DIAGNOSIS — M79.672 PAIN IN LEFT FOOT: Primary | ICD-10-CM

## 2023-05-01 DIAGNOSIS — G89.29 CHRONIC PAIN OF LEFT KNEE: ICD-10-CM

## 2023-05-01 DIAGNOSIS — M54.50 ACUTE LEFT-SIDED LOW BACK PAIN WITHOUT SCIATICA: ICD-10-CM

## 2023-05-01 DIAGNOSIS — M25.562 CHRONIC PAIN OF LEFT KNEE: ICD-10-CM

## 2023-05-01 PROCEDURE — 97110 THERAPEUTIC EXERCISES: CPT

## 2023-05-01 PROCEDURE — 97140 MANUAL THERAPY 1/> REGIONS: CPT

## 2023-05-01 NOTE — PROGRESS NOTES
Physical Therapy Daily Treatment Note     Name: Sanjana Spencer [facility]  Clinic Number: [id number]    Therapy Diagnosis:   Encounter Diagnoses   Name Primary?    Pain in left foot Yes    Chronic pain of left knee     Pain in left hip     Acute left-sided low back pain without sciatica        Physician: Murphy Haines,*    Visit Date: 5/1/2023  Physician Orders: PT Eval and Treat   Medical Diagnosis from Referral:      M53.3 (ICD-10-CM) - Sacroiliac dysfunction   M72.2 (ICD-10-CM) - Plantar fasciitis of left foot      Evaluation Date: 4/6/2023  Authorization Period Expiration: 04/03/2024  Plan of Care Expiration: 7/6/2023  Visit # / Visits authorized: 1/ 1, 3/20      Time In: 1005   Time Out: 1100   Total Appointment Time (timed & untimed codes): 55 minutes     Precautions: Standard    Subjective     Pt reports: Felt reduced pain in glute and feet after last visit, but still there for the past week. This weekend she was on her feet for long periods of time. Unable to do HEP due to time as well this weekend, but when doing HS curl with band gets ~50% relief.     Increased pain today in both foot / back.    She was compliant with home exercise program.  Response to previous treatment: relief for 2-3 days  Functional change: no changes    Pain: 5/10  Location: left back , buttocks , feet , and knee      Objective     Lower Extremity Strength at IE:  Right LE   Left LE     Quadriceps: SLR - WFL Quadriceps: SLR - WFL   Hamstrings: 4+/5 Hamstrings: 3+/5   Hip Extension:  4/5 Hip Extension: 3+/5   Hip ABD: 4-/5 Hip ABD: 3/5     Ankle Right Left   1st MTP Ext 4+/5 3+/5   1st MTP Flexion 4+/5 4/5 * pain   Dorsiflexion 5/5 5/5   Plantarflexion np np   Inversion 4/5 4-/5   Eversion 4+/5 4-/5     L/S: ROM WNL, but pain with flexed to extended position; hips as on eval    Sanjana received therapeutic exercises to develop strength, endurance, ROM, flexibility, posture, and core stabilization for 45 minutes including:      Patient education:  - Ankle: strength / flexibility / inflammation management  - SIJ: hip strength / stability / hip flexibility  - Adjustments of HEP     Leg flexion DL / SL 35lbs 3x10-15 reps ea  Knee across chest stretch 3x30 sec ea  Sim stretch w/ strap 3x30 sec ea  Bridges 3x10    GTB peroneals x100 - HEP  GTB post tib x100 - HEP  Toe yoga 15x10 sec - cue abd of big toe       NEXT VISITS - Core / Sidesteps; Cont HS curl, Theraband for ankle / EHL / DN       Return next visit:  Seated GTB HS curl 10x10 sec - HEP  Plantar fasciitis stretch towel 3x30 sec ea - pre/post DN    Sanjana received the following manual therapy techniques: DN were applied to the: syl ankle / foot for 10 minutes, including:    Assessment of TrP    Dry Needling:  Patient educated on intervention of dry needling including risks, benefits, and post administration expectations including potential soreness, bruising, and fatigue. Patient demonstrated verbal understanding and consented to intervention today.    DN performed to bilateral gastroc / soleus / peroneals and plantar fascia insertion for 10 min with manual stimulation      Home Exercises Provided and Patient Education Provided     Education provided:   - see above    Written Home Exercises Provided: yes.  Exercises were reviewed and Sanjana was able to demonstrate them prior to the end of the session.  Sanjana demonstrated good  understanding of the education provided.     See EMR under Patient Instructions for exercises provided  IE and 4/10/2023 and 4/24/2023 .    Assessment     Increased LBP / foot pain secondary to activity level over weekend without good compliance with HEP.    Able to reduce L hip / LBP well again following HS curl machine and hip stretches.     Concerning L foot/ankle; improved tissue texture following DN. Improving EHL activation noted on toe yoga, but needed cuing for slight abduction during this leading to increased challenge. Good challenge to peroneal  / post tib activation as well within visit.    Cont with focus on DN and stretching primarily for foot and hip stretching / stabilization for L/S. Progress as able.    Sanjana Is progressing well towards her goals.   Pt prognosis is Fair.     Pt will continue to benefit from skilled outpatient physical therapy to address the deficits listed in the problem list box on initial evaluation, provide pt/family education and to maximize pt's level of independence in the home and community environment.     Pt's spiritual, cultural and educational needs considered and pt agreeable to plan of care and goals.    Anticipated barriers to physical therapy: BMI and chronicity    GOALS: Short Term Goals:  0-4 weeks  1.Report decreased Foot pain  < / =  4/10  to increase tolerance for ADLs  2. Increase ROM by 5-10 degrees where limited in order to perform ADLs without difficulty.  3. Increase strength by 1/3 MMT grade in intrinsics and hips to increase tolerance for ADL and work activities.  4. Pt to tolerate HEP to improve ROM and independence with ADL's     Long Term Goals: 5-8 weeks  1.Report decreased Foot and Low back pain < / = 2/10  to increase tolerance for ADLs  2.Patient goal: ADLs without complaint  3.Increase strength to 4+/5 in  intrinsics and hips  to increase tolerance for ADL and work activities.  4. Pt will report at [facility] on FOTO  to demonstrate increase in LE function with every day tasks.     Plan     See POC in treatment section for evaluation    VICKY GARCIA, PT, DPT, OCS

## 2023-05-08 ENCOUNTER — CLINICAL SUPPORT (OUTPATIENT)
Dept: REHABILITATION | Facility: HOSPITAL | Age: 43
End: 2023-05-08
Payer: COMMERCIAL

## 2023-05-08 DIAGNOSIS — M25.562 CHRONIC PAIN OF LEFT KNEE: ICD-10-CM

## 2023-05-08 DIAGNOSIS — M54.50 ACUTE LEFT-SIDED LOW BACK PAIN WITHOUT SCIATICA: ICD-10-CM

## 2023-05-08 DIAGNOSIS — G89.29 CHRONIC PAIN OF LEFT KNEE: ICD-10-CM

## 2023-05-08 DIAGNOSIS — M25.552 PAIN IN LEFT HIP: ICD-10-CM

## 2023-05-08 DIAGNOSIS — M79.672 PAIN IN LEFT FOOT: Primary | ICD-10-CM

## 2023-05-08 PROCEDURE — 97140 MANUAL THERAPY 1/> REGIONS: CPT

## 2023-05-08 PROCEDURE — 97110 THERAPEUTIC EXERCISES: CPT

## 2023-05-08 NOTE — PROGRESS NOTES
Physical Therapy Daily Treatment Note     Name: Sanjana Spencer [facility]  Clinic Number: [id number]    Therapy Diagnosis:   Encounter Diagnoses   Name Primary?    Pain in left foot Yes    Chronic pain of left knee     Pain in left hip     Acute left-sided low back pain without sciatica        Physician: Murphy Haines,*    Visit Date: 5/8/2023  Physician Orders: PT Eval and Treat   Medical Diagnosis from Referral:      M53.3 (ICD-10-CM) - Sacroiliac dysfunction   M72.2 (ICD-10-CM) - Plantar fasciitis of left foot      Evaluation Date: 4/6/2023  Authorization Period Expiration: 04/03/2024  Plan of Care Expiration: 7/6/2023  Visit # / Visits authorized: 1/ 1, 4/20     Time In: 1000  Time Out: 1100    Total Appointment Time (timed & untimed codes): 60 minutes     Precautions: Standard    Subjective     Pt reports: Able to control LBP / L hip pain with HEP. Foot pain improves with DN, but cont to return with increased activity level on feet. Was on feet most of Sunday leading to increased symptoms.    She was compliant with home exercise program.  Response to previous treatment: relief for 2-3 days  Functional change: no changes    Pain: 5/10  Location: left back , buttocks , feet , and knee      Objective     Lower Extremity Strength at IE:  Right LE   Left LE     Quadriceps: SLR - WFL Quadriceps: SLR - WFL   Hamstrings: 4+/5 Hamstrings: 3+/5   Hip Extension:  4/5 Hip Extension: 3+/5   Hip ABD: 4-/5 Hip ABD: 3/5     Ankle Right Left   1st MTP Ext 4+/5 3+/5   1st MTP Flexion 4+/5 4/5 * pain   Dorsiflexion 5/5 5/5   Plantarflexion np np   Inversion 4/5 4-/5   Eversion 4+/5 4-/5     L/S: ROM WNL, but pain with flexed to extended position; hips as on eval    Sanjana received therapeutic exercises to develop strength, endurance, ROM, flexibility, posture, and core stabilization for 50 minutes including:     Patient education:  - Ankle: strength / flexibility / inflammation management  - SIJ: hip strength / stability /  hip flexibility  - Adjustments of HEP     Leg flexion DL / SL 45lbs 3x10-15 reps ea  Sidesteps YTB x5 laps at counter - HEP  Bridges YTB 3x10-15 - heels dug in - HEP  SLR with TrA 10x10 sec ea  Shuttle press DL 2B 3x10    BTB peroneals x120 - HEP  BTB post tib x120 - HEP       NEXT VISITS - Shuttle; Theraband for ankle / EHL / DN       Return next visit:  Knee across chest stretch 3x30 sec ea  Sim stretch w/ strap 3x30 sec ea  Toe yoga 15x10 sec - cue abd of big toe  Seated GTB HS curl 10x10 sec - HEP  Plantar fasciitis stretch towel 3x30 sec ea - pre/post DN    Sanjana received the following manual therapy techniques: DN were applied to the: syl ankle / foot for 10 minutes, including:    Assessment of TrP    Dry Needling:  Patient educated on intervention of dry needling including risks, benefits, and post administration expectations including potential soreness, bruising, and fatigue. Patient demonstrated verbal understanding and consented to intervention today.    DN performed to bilateral gastroc / soleus / peroneals and plantar fascia insertion for 10 min with manual stimulation      Home Exercises Provided and Patient Education Provided     Education provided:   - see above    Written Home Exercises Provided: yes.  Exercises were reviewed and Sanjana was able to demonstrate them prior to the end of the session.  Sanjana demonstrated good  understanding of the education provided.     See EMR under Patient Instructions for exercises provided  IE and 4/10/2023 and 4/24/2023 .    Assessment     Again patient had increased LBP / foot pain secondary to activity level over weekend. Again able to reduce L hip / LBP with interventions and patient able to control these symptoms well with HEP. DN seems to also be managing foot pain throughout week, but cont to return with prolonged weightbearing activities likely secondary to strength deficits leading to increased load on plantar fascia. Discussed with patient need  for increasing strength with HEP (good verbal understanding) and santino increased challenge for strengthening well within visit. HEP adjusted accordingly based on challenge.    Cont with focus on DN and stretching primarily for foot and hip stretching / stabilization for L/S. Progress as able.    Sanjana Is progressing well towards her goals.   Pt prognosis is Fair.     Pt will continue to benefit from skilled outpatient physical therapy to address the deficits listed in the problem list box on initial evaluation, provide pt/family education and to maximize pt's level of independence in the home and community environment.     Pt's spiritual, cultural and educational needs considered and pt agreeable to plan of care and goals.    Anticipated barriers to physical therapy: BMI and chronicity    GOALS: Short Term Goals:  0-4 weeks  1.Report decreased Foot pain  < / =  4/10  to increase tolerance for ADLs  2. Increase ROM by 5-10 degrees where limited in order to perform ADLs without difficulty.  3. Increase strength by 1/3 MMT grade in intrinsics and hips to increase tolerance for ADL and work activities.  4. Pt to tolerate HEP to improve ROM and independence with ADL's     Long Term Goals: 5-8 weeks  1.Report decreased Foot and Low back pain < / = 2/10  to increase tolerance for ADLs  2.Patient goal: ADLs without complaint  3.Increase strength to 4+/5 in  intrinsics and hips  to increase tolerance for ADL and work activities.  4. Pt will report at [facility] on FOTO  to demonstrate increase in LE function with every day tasks.     Plan     See POC in treatment section for evaluation    VICKY GARCIA, PT, DPT, OCS

## 2023-05-09 ENCOUNTER — PATIENT MESSAGE (OUTPATIENT)
Dept: PRIMARY CARE CLINIC | Facility: CLINIC | Age: 43
End: 2023-05-09
Payer: COMMERCIAL

## 2023-05-12 ENCOUNTER — PATIENT MESSAGE (OUTPATIENT)
Dept: PRIMARY CARE CLINIC | Facility: CLINIC | Age: 43
End: 2023-05-12
Payer: COMMERCIAL

## 2023-05-12 RX ORDER — CELECOXIB 200 MG/1
200 CAPSULE ORAL 2 TIMES DAILY
Qty: 60 CAPSULE | Refills: 5 | Status: SHIPPED | OUTPATIENT
Start: 2023-05-12 | End: 2023-08-29 | Stop reason: SDUPTHER

## 2023-05-15 ENCOUNTER — CLINICAL SUPPORT (OUTPATIENT)
Dept: REHABILITATION | Facility: HOSPITAL | Age: 43
End: 2023-05-15
Payer: COMMERCIAL

## 2023-05-15 DIAGNOSIS — M79.672 PAIN IN LEFT FOOT: Primary | ICD-10-CM

## 2023-05-15 DIAGNOSIS — M54.50 ACUTE LEFT-SIDED LOW BACK PAIN WITHOUT SCIATICA: ICD-10-CM

## 2023-05-15 DIAGNOSIS — G89.29 CHRONIC PAIN OF LEFT KNEE: ICD-10-CM

## 2023-05-15 DIAGNOSIS — M25.552 PAIN IN LEFT HIP: ICD-10-CM

## 2023-05-15 DIAGNOSIS — M25.562 CHRONIC PAIN OF LEFT KNEE: ICD-10-CM

## 2023-05-15 PROCEDURE — 97014 ELECTRIC STIMULATION THERAPY: CPT

## 2023-05-15 PROCEDURE — 97530 THERAPEUTIC ACTIVITIES: CPT

## 2023-05-15 PROCEDURE — 97110 THERAPEUTIC EXERCISES: CPT

## 2023-05-15 PROCEDURE — 97112 NEUROMUSCULAR REEDUCATION: CPT

## 2023-05-15 PROCEDURE — 97140 MANUAL THERAPY 1/> REGIONS: CPT

## 2023-05-15 NOTE — PROGRESS NOTES
"  Physical Therapy Daily Treatment Note     Name: Sanjana Spencer [facility]  Clinic Number: [id number]    Therapy Diagnosis:   Encounter Diagnoses   Name Primary?    Pain in left foot Yes    Chronic pain of left knee     Pain in left hip     Acute left-sided low back pain without sciatica        Physician: Murphy Haines,*    Visit Date: 5/15/2023  Physician Orders: PT Eval and Treat   Medical Diagnosis from Referral:      M53.3 (ICD-10-CM) - Sacroiliac dysfunction   M72.2 (ICD-10-CM) - Plantar fasciitis of left foot      Evaluation Date: 4/6/2023  Authorization Period Expiration: 04/03/2024  Plan of Care Expiration: 7/6/2023  Visit # / Visits authorized: 1/ 1, 5/20      Time In: 1000   Time Out: 1100  Total Appointment Time (timed & untimed codes): 60 minutes     Precautions: Standard    Subjective     Pt reports: Back has been feeling pretty good. Small improvement with pain waking up in the morning. Feels like DN and HEP adjustments helping progress. Did feel like she had a "bhaskar horse" almost all week, but this has subsided.    NEXT VISIT - FOTO    She was compliant with home exercise program.  Response to previous treatment: relief for 2-3 days  Functional change: no changes    Pain: 3 - 5/10  Location: left back , buttocks , feet , and knee      Objective     Lower Extremity Strength at IE:  Right LE   Left LE     Quadriceps: SLR - WFL Quadriceps: SLR - WFL   Hamstrings: 4+/5 Hamstrings: 3+/5   Hip Extension:  4/5 Hip Extension: 3+/5   Hip ABD: 4-/5 Hip ABD: 3/5     Ankle Right Left   1st MTP Ext 4+/5 3+/5   1st MTP Flexion 4+/5 4/5 * pain   Dorsiflexion 5/5 5/5   Plantarflexion np np   Inversion 4/5 4-/5   Eversion 4+/5 4-/5     L/S: ROM WNL, but pain with flexed to extended position; hips as on eval    Sanjana received therapeutic exercises to develop strength, endurance, ROM, flexibility, posture, and core stabilization for 20 minutes including:     Patient education:  - Ankle: strength / flexibility / " inflammation management  - SIJ: hip strength / stability / hip flexibility  - Adjustments of HEP     Leg flexion SL 45lbs 3x10-15 reps ea  Leg flexion DL 55lbs 3x10-15 reps ea  BTB peroneals x150 - HEP  BTB post tib x150 - HEP    NEXT VISITS - cont Shuttle; Theraband for ankle / EHL / DN        Held:  Bridges YTB 3x10-15 - heels dug in - HEP  SLR with TrA 10x10 sec ea  Knee across chest stretch 3x30 sec ea  Sim stretch w/ strap 3x30 sec ea  Toe yoga 15x10 sec - cue abd of big toe  Seated GTB HS curl 10x10 sec - HEP  Plantar fasciitis stretch towel 3x30 sec ea - pre/post DN    Sanjana received the following manual therapy techniques: DN were applied to the: syl ankle / foot for 10 minutes, including:    Assessment of TrP    Dry Needling:  Patient educated on intervention of dry needling including risks, benefits, and post administration expectations including potential soreness, bruising, and fatigue. Patient demonstrated verbal understanding and consented to intervention today.    DN performed to L gastroc / soleus / peroneals insertion for 10 min with manual and electrical stimulation (UNATTENDED E-STIM).      Sanjana participated in neuromuscular re-education activities to improve: Balance, Coordination, Kinesthetic, Sense, Proprioception, and Posture for 15 minutes. The following activities were included:    For hip stability NM activation:    90/90 hip lift adduction 4x30 sec hold - HS iso  90/90 hip shift too difficult  Sidesteps GTB x5 laps at counter - HEP    Sanjana participated in dynamic functional therapeutic activities to improve functional performance for 15 minutes, including:    For functional loading / squatting:    Shuttle press DL 2B 3x10-15  Shuttle press SL 1B1R 3x10-15 ea    Home Exercises Provided and Patient Education Provided     Education provided:   - see above    Written Home Exercises Provided: yes.  Exercises were reviewed and Sanjana was able to demonstrate them prior to the end of  the session.  Sanjana demonstrated good  understanding of the education provided.     See EMR under Patient Instructions for exercises provided  IE and 4/10/2023 and 4/24/2023 .    Assessment     Improving symptoms secondary to improving strength and stability of hips, though cont to require focus on this. Oj increased challenge with 90/90 hip lift well to target HS for pelvic / hip stability. Improving calf tissue texture weekly following DN also likely improving patient symptoms with first step in the morning.    Cont with focus on DN and stretching primarily for foot and hip stretching / stabilization for L/S. Progress as able.    Sanjana Is progressing well towards her goals.   Pt prognosis is Fair.     Pt will continue to benefit from skilled outpatient physical therapy to address the deficits listed in the problem list box on initial evaluation, provide pt/family education and to maximize pt's level of independence in the home and community environment.     Pt's spiritual, cultural and educational needs considered and pt agreeable to plan of care and goals.    Anticipated barriers to physical therapy: BMI and chronicity    GOALS: Short Term Goals:  0-4 weeks  1.Report decreased Foot pain  < / =  4/10  to increase tolerance for ADLs  2. Increase ROM by 5-10 degrees where limited in order to perform ADLs without difficulty.  3. Increase strength by 1/3 MMT grade in intrinsics and hips to increase tolerance for ADL and work activities.  4. Pt to tolerate HEP to improve ROM and independence with ADL's     Long Term Goals: 5-8 weeks  1.Report decreased Foot and Low back pain < / = 2/10  to increase tolerance for ADLs  2.Patient goal: ADLs without complaint  3.Increase strength to 4+/5 in  intrinsics and hips  to increase tolerance for ADL and work activities.  4. Pt will report at [facility] on FOTO  to demonstrate increase in LE function with every day tasks.     Plan     See POC in treatment section for  evaluation    VICKY GARCIA, PT, DPT, OCS

## 2023-05-16 DIAGNOSIS — R60.9 FLUID RETENTION: ICD-10-CM

## 2023-05-17 RX ORDER — FUROSEMIDE 40 MG/1
TABLET ORAL
Qty: 135 TABLET | Refills: 3 | Status: SHIPPED | OUTPATIENT
Start: 2023-05-17

## 2023-05-17 NOTE — TELEPHONE ENCOUNTER
Care Due:                  Date            Visit Type   Department     Provider  --------------------------------------------------------------------------------                                MYCHART                              ANNUAL                              CHECKUP/PHY  [facility] PRIMARY  Last Visit: 12-      S            CARE           Gisselle Tavera                               -                              PRIMARY  Next Visit: 06-      CARE ([facility])   None Found     Gisselle Tavera                                                            Last  Test          Frequency    Reason                     Performed    Due Date  --------------------------------------------------------------------------------    HBA1C.......  6 months...  semaglutide..............  12- 06-    [employer] Embedded Care Due Messages. Reference number: [id number].   5/16/2023 11:10:10 PM CDT

## 2023-05-17 NOTE — TELEPHONE ENCOUNTER
Refill Routing Note   Refill Routing Note   Medication(s) are not appropriate for processing by [facility] for the following reason(s):      Required vitals abnormal    [facility] action(s):  Defer Labs due        Medication reconciliation completed: No     Appointments  past 12m or future 3m with PCP    Date Provider   Last Visit   12/9/2022 Gisselle Tavera MD   Next Visit   6/16/2023 Gisselle Tavera MD   ED visits in past 90 days: 0        Note composed:11:27 PM 05/16/2023

## 2023-05-22 ENCOUNTER — CLINICAL SUPPORT (OUTPATIENT)
Dept: REHABILITATION | Facility: HOSPITAL | Age: 43
End: 2023-05-22
Payer: COMMERCIAL

## 2023-05-22 DIAGNOSIS — M25.562 CHRONIC PAIN OF LEFT KNEE: ICD-10-CM

## 2023-05-22 DIAGNOSIS — G89.29 CHRONIC PAIN OF LEFT KNEE: ICD-10-CM

## 2023-05-22 DIAGNOSIS — M25.552 PAIN IN LEFT HIP: ICD-10-CM

## 2023-05-22 DIAGNOSIS — M79.672 PAIN IN LEFT FOOT: Primary | ICD-10-CM

## 2023-05-22 DIAGNOSIS — M54.50 ACUTE LEFT-SIDED LOW BACK PAIN WITHOUT SCIATICA: ICD-10-CM

## 2023-05-22 PROCEDURE — 97140 MANUAL THERAPY 1/> REGIONS: CPT

## 2023-05-22 PROCEDURE — 97530 THERAPEUTIC ACTIVITIES: CPT

## 2023-05-22 PROCEDURE — 97110 THERAPEUTIC EXERCISES: CPT

## 2023-05-22 PROCEDURE — 97112 NEUROMUSCULAR REEDUCATION: CPT

## 2023-05-22 PROCEDURE — 97014 ELECTRIC STIMULATION THERAPY: CPT

## 2023-05-22 NOTE — PROGRESS NOTES
Physical Therapy Daily Treatment Note     Name: Sanjana Spencer [facility]  Clinic Number: [id number]    Therapy Diagnosis:   No diagnosis found.      Physician: Murphy Haines,*    Visit Date: 5/22/2023  Physician Orders: PT Eval and Treat   Medical Diagnosis from Referral:      M53.3 (ICD-10-CM) - Sacroiliac dysfunction   M72.2 (ICD-10-CM) - Plantar fasciitis of left foot      Evaluation Date: 4/6/2023  Authorization Period Expiration: 04/03/2024  Plan of Care Expiration: 7/6/2023  Visit # / Visits authorized: 1/ 1, 6/20      Time In: 1000    Time Out: 1100   Total Appointment Time (timed & untimed codes): 60 minutes     Precautions: Standard    Subjective     Pt reports: Back and feet feeling better. On feet this a lot this weekend w/o increase in symptoms. Feet feel 50-60% better.     FOTO IE - 62%  FOTO 5/22/23 - 67%   FOTO Goal - 69%    She was compliant with home exercise program.  Response to previous treatment: relief for 2-3 days  Functional change: no changes    Pain: 1-3/10  Location: left back , buttocks , feet , and knee      Objective     Lower Extremity Strength at IE:  Right LE   Left LE     Quadriceps: SLR - WFL Quadriceps: SLR - WFL   Hamstrings: 4+/5 Hamstrings: 3+/5   Hip Extension:  4/5 Hip Extension: 3+/5   Hip ABD: 4-/5 Hip ABD: 3/5     Ankle Right Left   1st MTP Ext 4+/5 3+/5   1st MTP Flexion 4+/5 4/5 * pain   Dorsiflexion 5/5 5/5   Plantarflexion np np   Inversion 4/5 4-/5   Eversion 4+/5 4-/5     L/S: ROM WNL, but pain with flexed to extended position; hips as on eval    Sanjana received therapeutic exercises to develop strength, endurance, ROM, flexibility, posture, and core stabilization for 15 minutes including:     Patient education:  - Ankle: strength / flexibility / inflammation management  - SIJ: hip strength / stability / hip flexibility  - Adjustments of HEP    BTB plantar flexion x150    BTB peroneals x150 - HEP  BTB post tib x150 - HEP      NEXT VISITS - leg extension; cont  Shuttle       Held:  Leg flexion SL 45lbs 3x10-15 reps ea   Leg flexion DL 55lbs 3x10-15 reps ea  Bridges YTB 3x10-15 - heels dug in - HEP  SLR with TrA 10x10 sec ea  Knee across chest stretch 3x30 sec ea  Sim stretch w/ strap 3x30 sec ea  Toe yoga 15x10 sec - cue abd of big toe  Seated GTB HS curl 10x10 sec - HEP  Plantar fasciitis stretch towel 3x30 sec ea - pre/post DN    Sanjana received the following manual therapy techniques: DN were applied to the: syl ankle / foot for 10 minutes, including:    Assessment of TrP    Dry Needling:  Patient educated on intervention of dry needling including risks, benefits, and post administration expectations including potential soreness, bruising, and fatigue. Patient demonstrated verbal understanding and consented to intervention today.    DN performed to L gastroc / soleus / post tib / peroneals insertion for 10 min with manual and electrical stimulation (UNATTENDED E-STIM).      Sanjana participated in neuromuscular re-education activities to improve: Balance, Coordination, Kinesthetic, Sense, Proprioception, and Posture for 20 minutes. The following activities were included:    For hip stability NM activation:  Sidesteps GTB x5 laps at counter - HEP  90/90 hip lift adduction 5x30 sec hold - HS iso  90/90 hip lift GTB abduction 5x30 sec hold - HS iso      Sanjana participated in dynamic functional therapeutic activities to improve functional performance for 15 minutes, including:    For functional loading / squatting:  Shuttle press DL 2B1R 3x10-15  Shuttle press SL 2B 3x10-15 ea      Home Exercises Provided and Patient Education Provided     Education provided:   - see above    Written Home Exercises Provided: yes.  Exercises were reviewed and Sanjana was able to demonstrate them prior to the end of the session.  Sanjana demonstrated good  understanding of the education provided.     See EMR under Patient Instructions for exercises provided  IE and 4/10/2023 and  4/24/2023 .    Assessment     Improving function per FOTO scores. Improving symptoms with improving strength and stability of hips along with improving calf tissue texture.    Oj all interventions well within visit and with progressions.    Cont with focus on DN and stretching primarily for foot and hip stretching / stabilization for L/S. Shift to knee focus when as plantar fascia pain / LBP resolves.    Sanjana Is progressing well towards her goals.   Pt prognosis is Fair.     Pt will continue to benefit from skilled outpatient physical therapy to address the deficits listed in the problem list box on initial evaluation, provide pt/family education and to maximize pt's level of independence in the home and community environment.     Pt's spiritual, cultural and educational needs considered and pt agreeable to plan of care and goals.    Anticipated barriers to physical therapy: BMI and chronicity    GOALS: Short Term Goals:  0-4 weeks  1.Report decreased Foot pain  < / =  4/10  to increase tolerance for ADLs  2. Increase ROM by 5-10 degrees where limited in order to perform ADLs without difficulty.  3. Increase strength by 1/3 MMT grade in intrinsics and hips to increase tolerance for ADL and work activities.  4. Pt to tolerate HEP to improve ROM and independence with ADL's     Long Term Goals: 5-8 weeks  1.Report decreased Foot and Low back pain < / = 2/10  to increase tolerance for ADLs  2.Patient goal: ADLs without complaint  3.Increase strength to 4+/5 in  intrinsics and hips  to increase tolerance for ADL and work activities.  4. Pt will report at [facility] on FOTO  to demonstrate increase in LE function with every day tasks.     Plan     See POC in treatment section for evaluation    VICKY GARCIA, PT, DPT, OCS

## 2023-05-31 ENCOUNTER — PATIENT MESSAGE (OUTPATIENT)
Dept: PRIMARY CARE CLINIC | Facility: CLINIC | Age: 43
End: 2023-05-31
Payer: COMMERCIAL

## 2023-06-01 ENCOUNTER — LAB VISIT (OUTPATIENT)
Dept: LAB | Facility: HOSPITAL | Age: 43
End: 2023-06-01
Attending: ALLERGY & IMMUNOLOGY
Payer: COMMERCIAL

## 2023-06-01 ENCOUNTER — CLINICAL SUPPORT (OUTPATIENT)
Dept: REHABILITATION | Facility: HOSPITAL | Age: 43
End: 2023-06-01
Payer: COMMERCIAL

## 2023-06-01 DIAGNOSIS — D80.9 IMMUNODEFICIENCY WITH PREDOMINANTLY ANTIBODY DEFECTS: ICD-10-CM

## 2023-06-01 DIAGNOSIS — M79.672 PAIN IN LEFT FOOT: Primary | ICD-10-CM

## 2023-06-01 DIAGNOSIS — G89.29 CHRONIC PAIN OF LEFT KNEE: ICD-10-CM

## 2023-06-01 DIAGNOSIS — M25.562 CHRONIC PAIN OF LEFT KNEE: ICD-10-CM

## 2023-06-01 DIAGNOSIS — M25.552 PAIN IN LEFT HIP: ICD-10-CM

## 2023-06-01 DIAGNOSIS — D80.3 IMMUNOGLOBULIN G SUBCLASS DEFICIENCY: ICD-10-CM

## 2023-06-01 DIAGNOSIS — M79.672 PAIN IN LEFT FOOT: ICD-10-CM

## 2023-06-01 DIAGNOSIS — M54.50 ACUTE LEFT-SIDED LOW BACK PAIN WITHOUT SCIATICA: ICD-10-CM

## 2023-06-01 LAB
ALBUMIN SERPL BCP-MCNC: 3.9 G/DL (ref 3.5–5.2)
ALP SERPL-CCNC: 83 U/L (ref 55–135)
ALT SERPL W/O P-5'-P-CCNC: 25 U/L (ref 10–44)
ANION GAP SERPL CALC-SCNC: 11 MMOL/L (ref 8–16)
AST SERPL-CCNC: 18 U/L (ref 10–40)
BILIRUB SERPL-MCNC: 0.4 MG/DL (ref 0.1–1)
BUN SERPL-MCNC: 11 MG/DL (ref 6–20)
CALCIUM SERPL-MCNC: 9 MG/DL (ref 8.7–10.5)
CHLORIDE SERPL-SCNC: 106 MMOL/L (ref 95–110)
CO2 SERPL-SCNC: 24 MMOL/L (ref 23–29)
CREAT SERPL-MCNC: 0.7 MG/DL (ref 0.5–1.4)
EST. GFR  (NO RACE VARIABLE): >60 ML/MIN/1.73 M^2
GLUCOSE SERPL-MCNC: 77 MG/DL (ref 70–110)
POTASSIUM SERPL-SCNC: 4.2 MMOL/L (ref 3.5–5.1)
PROT SERPL-MCNC: 7.5 G/DL (ref 6–8.4)
SODIUM SERPL-SCNC: 141 MMOL/L (ref 136–145)

## 2023-06-01 PROCEDURE — 82787 IGG 1 2 3 OR 4 EACH: CPT | Mod: 59 | Performed by: ALLERGY & IMMUNOLOGY

## 2023-06-01 PROCEDURE — 80053 COMPREHEN METABOLIC PANEL: CPT | Performed by: ALLERGY & IMMUNOLOGY

## 2023-06-01 PROCEDURE — 97140 MANUAL THERAPY 1/> REGIONS: CPT

## 2023-06-01 PROCEDURE — 97530 THERAPEUTIC ACTIVITIES: CPT

## 2023-06-01 PROCEDURE — 36415 COLL VENOUS BLD VENIPUNCTURE: CPT | Performed by: ALLERGY & IMMUNOLOGY

## 2023-06-01 PROCEDURE — 97112 NEUROMUSCULAR REEDUCATION: CPT

## 2023-06-01 NOTE — PROGRESS NOTES
Physical Therapy Daily Treatment Note     Name: Sanjana Spencer [facility]  Clinic Number: [id number]    Therapy Diagnosis:   Encounter Diagnoses   Name Primary?    Pain in left foot Yes    Chronic pain of left knee     Pain in left hip     Acute left-sided low back pain without sciatica     Immunoglobulin G subclass deficiency     Immunodeficiency with predominantly antibody defects          Physician: Murphy Haines,*    Visit Date: 6/1/2023  Physician Orders: PT Eval and Treat   Medical Diagnosis from Referral:      M53.3 (ICD-10-CM) - Sacroiliac dysfunction   M72.2 (ICD-10-CM) - Plantar fasciitis of left foot      Evaluation Date: 4/6/2023  Authorization Period Expiration: 04/03/2024  Plan of Care Expiration: 7/6/2023  Visit # / Visits authorized: 1/ 1, 7/20      Time In: 1500  Time Out: 1600  Total Appointment Time (timed & untimed codes): 60 minutes     Precautions: Standard    Subjective     Pt reports: No significant changes since last seen. Back feeling very good and was not aggravated with gardening. Knee is slowly improving and ankle / foot is improved, but about the same.    FOTO IE - 62%  FOTO 5/22/23 - 67%   FOTO Goal - 69%    She was compliant with home exercise program.  Response to previous treatment: relief for 2-3 days  Functional change: no changes    Pain: 1-3/10  Location: left back , buttocks , feet , and knee      Objective     Lower Extremity Strength at IE:  Right LE   Left LE     Quadriceps: SLR - WFL Quadriceps: SLR - WFL   Hamstrings: 4+/5 Hamstrings: 3+/5   Hip Extension:  4/5 Hip Extension: 3+/5   Hip ABD: 4-/5 Hip ABD: 3/5     Ankle Right Left   1st MTP Ext 4+/5 3+/5   1st MTP Flexion 4+/5 4/5 * pain -> no pain today 6/1/23   Dorsiflexion 5/5 5/5   Plantarflexion np np   Inversion 4/5 4-/5   Eversion 4+/5 4-/5     L/S: ROM WNL, but pain with flexed to extended position; hips as on eval    Sanjana received therapeutic exercises to develop strength, endurance, ROM, flexibility,  posture, and core stabilization for 00 minutes including:     Patient education:  - Ankle: strength / flexibility / inflammation management  - SIJ: hip strength / stability / hip flexibility  - Adjustments of HEP      NEXT VISITS - quad / posterior chain; cont Shuttle       Held:  BTB plantar flexion x150    BTB peroneals x150 - HEP  BTB post tib x150 - HEP  Leg flexion SL 45lbs 3x10-15 reps ea   Leg flexion DL 55lbs 3x10-15 reps ea  Bridges YTB 3x10-15 - heels dug in - HEP  SLR with TrA 10x10 sec ea  Knee across chest stretch 3x30 sec ea  Sim stretch w/ strap 3x30 sec ea  Toe yoga 15x10 sec - cue abd of big toe  Seated GTB HS curl 10x10 sec - HEP  Plantar fasciitis stretch towel 3x30 sec ea - pre/post DN    Sanjana received the following manual therapy techniques: DN were applied to the: syl ankle / foot for 15 minutes, including:    Assessment of TrP    Dry Needling:  Patient educated on intervention of dry needling including risks, benefits, and post administration expectations including potential soreness, bruising, and fatigue. Patient demonstrated verbal understanding and consented to intervention today.    DN performed to L achilles tendon fascial insertion and intrinsic TrP for 15 min with manual stimulation.      Sanjana participated in neuromuscular re-education activities to improve: Balance, Coordination, Kinesthetic, Sense, Proprioception, and Posture for 20 minutes. The following activities were included:    For hip stability NM activation:  90/90 hip lift adduction 5x30 sec hold - HS iso  90/90 hip lift GTB abduction 5x30 sec hold - HS iso  Sidesteps GTB x5 laps at counter - Review    For quad activation:  ASLR 10x10 sec arnulfo      Sanjana participated in dynamic functional therapeutic activities to improve functional performance for 25 minutes, including:    For functional loading / squatting:  Shuttle press DL 3B 3x10 - no pain with hip dom > quad dom  Shuttle hover DL 3B 2x30 sec  Shuttle press SL  2B 3x10-15 ea      Home Exercises Provided and Patient Education Provided     Education provided:   - see above    Written Home Exercises Provided: yes.  Exercises were reviewed and Sanjana was able to demonstrate them prior to the end of the session.  Sanjana demonstrated good  understanding of the education provided.     See EMR under Patient Instructions for exercises provided  PF stretch, ASLR, 90/90 or sidesteps .    Assessment     No significant changes since last seen, but good carryover of symptoms with regards to back, knee, and foot/ankle pain.     Santino DN well and with improved tissue texture cristal of intrinsics. Some reduction of foot pain following this.    Santino all other interventions well within visit. Noted difficulty with quad recruitment and loading, but good santino to posterior chain loading. Both quad and posterior chain strength require focus for knee pain.    Cont with POC 1x every other week with focus on DN and stretching primarily for foot and posterior chain / quad strengthening for knee pain and low back pain maintenance.    Sanjana Is progressing well towards her goals.   Pt prognosis is Fair.     Pt will continue to benefit from skilled outpatient physical therapy to address the deficits listed in the problem list box on initial evaluation, provide pt/family education and to maximize pt's level of independence in the home and community environment.     Pt's spiritual, cultural and educational needs considered and pt agreeable to plan of care and goals.    Anticipated barriers to physical therapy: BMI and chronicity    GOALS: Short Term Goals:  0-4 weeks  1.Report decreased Foot pain  < / =  4/10  to increase tolerance for ADLs  2. Increase ROM by 5-10 degrees where limited in order to perform ADLs without difficulty.  3. Increase strength by 1/3 MMT grade in intrinsics and hips to increase tolerance for ADL and work activities.  4. Pt to tolerate HEP to improve ROM and independence with ADL's      Long Term Goals: 5-8 weeks  1.Report decreased Foot and Low back pain < / = 2/10  to increase tolerance for ADLs  2.Patient goal: ADLs without complaint  3.Increase strength to 4+/5 in  intrinsics and hips  to increase tolerance for ADL and work activities.  4. Pt will report at [facility] on FOTO  to demonstrate increase in LE function with every day tasks.     Plan     See POC in treatment section for evaluation    VICKY GARCIA, PT, DPT, OCS

## 2023-06-05 LAB
IGG1 SER-MCNC: 479 MG/DL (ref 382–929)
IGG2 SER-MCNC: 498 MG/DL (ref 242–700)
IGG3 SER-MCNC: 28 MG/DL (ref 22–176)
IGG4 SER-MCNC: 12 MG/DL (ref 4–86)

## 2023-06-15 ENCOUNTER — CLINICAL SUPPORT (OUTPATIENT)
Dept: REHABILITATION | Facility: HOSPITAL | Age: 43
End: 2023-06-15
Payer: COMMERCIAL

## 2023-06-15 DIAGNOSIS — M25.552 PAIN IN LEFT HIP: ICD-10-CM

## 2023-06-15 DIAGNOSIS — M79.672 PAIN IN LEFT FOOT: Primary | ICD-10-CM

## 2023-06-15 DIAGNOSIS — M25.562 CHRONIC PAIN OF LEFT KNEE: ICD-10-CM

## 2023-06-15 DIAGNOSIS — G89.29 CHRONIC PAIN OF LEFT KNEE: ICD-10-CM

## 2023-06-15 DIAGNOSIS — M54.50 ACUTE LEFT-SIDED LOW BACK PAIN WITHOUT SCIATICA: ICD-10-CM

## 2023-06-15 PROCEDURE — 97530 THERAPEUTIC ACTIVITIES: CPT

## 2023-06-15 PROCEDURE — 97140 MANUAL THERAPY 1/> REGIONS: CPT

## 2023-06-15 PROCEDURE — 97112 NEUROMUSCULAR REEDUCATION: CPT

## 2023-06-15 NOTE — PROGRESS NOTES
Physical Therapy Daily Treatment Note     Name: Sanjana Spencer [facility]  Clinic Number: [id number]    Therapy Diagnosis:   Encounter Diagnoses   Name Primary?    Pain in left foot Yes    Chronic pain of left knee     Pain in left hip     Acute left-sided low back pain without sciatica        Physician: Murphy Haines,*    Visit Date: 6/15/2023  Physician Orders: PT Eval and Treat   Medical Diagnosis from Referral:      M53.3 (ICD-10-CM) - Sacroiliac dysfunction   M72.2 (ICD-10-CM) - Plantar fasciitis of left foot      Evaluation Date: 4/6/2023  Authorization Period Expiration: 04/03/2024  Plan of Care Expiration: 7/6/2023  Visit # / Visits authorized: 1/ 1, 8/20      Time In: 1500   Time Out: 1555  Total Appointment Time (timed & untimed codes): 55 minutes     Precautions: Standard    Subjective     Pt reports: L foot and knee are about the same, but not worse at all. Occasional burning in plantar fascia region as well. Still improved since start of care.    FOTO IE - 62%  FOTO 5/22/23 - 67%   FOTO Goal - 69%    She was compliant with home exercise program.  Response to previous treatment: relief for 2-3 days  Functional change: no changes    Pain: 1-3/10  Location: left back , buttocks , feet , and knee      Objective     Lower Extremity Strength at IE:  Right LE   Left LE     Quadriceps: SLR - WFL Quadriceps: SLR - WFL   Hamstrings: 4+/5 Hamstrings: 3+/5   Hip Extension:  4/5 Hip Extension: 3+/5   Hip ABD: 4-/5 Hip ABD: 3/5     Ankle Right Left   1st MTP Ext 4+/5 3+/5   1st MTP Flexion 4+/5 4/5 * pain -> no pain today 6/1/23   Dorsiflexion 5/5 5/5   Plantarflexion np np   Inversion 4/5 4-/5   Eversion 4+/5 4-/5     L/S: ROM WNL, but pain with flexed to extended position; hips as on eval    Sanjana received therapeutic exercises to develop strength, endurance, ROM, flexibility, posture, and core stabilization for 00 minutes including:     Patient education:  - Ankle: strength / flexibility / inflammation  management  - SIJ: hip strength / stability / hip flexibility  - Adjustments of HEP      NEXT VISITS - quad / posterior chain; cont Shuttle       Held:  BTB plantar flexion x150    BTB peroneals x150 - HEP  BTB post tib x150 - HEP  Leg flexion SL 45lbs 3x10-15 reps ea   Leg flexion DL 55lbs 3x10-15 reps ea  Bridges YTB 3x10-15 - heels dug in - HEP  SLR with TrA 10x10 sec ea  Knee across chest stretch 3x30 sec ea  Sim stretch w/ strap 3x30 sec ea  Toe yoga 15x10 sec - cue abd of big toe  Seated GTB HS curl 10x10 sec - HEP  Plantar fasciitis stretch towel 3x30 sec ea - pre/post DN    Sanjana received the following manual therapy techniques: DN were applied to the: syl ankle / foot for 15 minutes, including:    Assessment of TrP    Dry Needling:  Patient educated on intervention of dry needling including risks, benefits, and post administration expectations including potential soreness, bruising, and fatigue. Patient demonstrated verbal understanding and consented to intervention today.    DN performed to L post tib, achilles tendon fascial insertion, and intrinsic TrP for 15 min with manual stimulation.      Sanjana participated in neuromuscular re-education activities to improve: Balance, Coordination, Kinesthetic, Sense, Proprioception, and Posture for 20 minutes. The following activities were included:    For quad activation:  ASLR 10x10 sec ea  ASLR 3x10x3 sec 3lbs  SAQ 10x10 sec 3lbs  LAQ 10x10 sec 3lbs    Held:  For hip stability NM activation:  90/90 hip lift adduction 5x30 sec hold - HS iso  90/90 hip lift GTB abduction 5x30 sec hold - HS iso  Sidesteps GTB x5 laps at counter - Review    Sanjana participated in dynamic functional therapeutic activities to improve functional performance for 25 minutes, including:    For functional loading / squatting:  Shuttle press DL 3B 3x10 - hip dom > quad dom  Shuttle hover DL 3B 3x30 sec  Shuttle press SL 2B 3x10-15 ea      Home Exercises Provided and Patient Education  Provided     Education provided:   - see above    Written Home Exercises Provided: yes.  Exercises were reviewed and Sanjana was able to demonstrate them prior to the end of the session.  Sanjana demonstrated good  understanding of the education provided.     See EMR under Patient Instructions for exercises provided  PF stretch, ASLR, 90/90 or sidesteps .    Assessment     No significant changes since last seen, but cont good carryover of symptoms with regards to back, knee, and foot/ankle pain.     Oj DN well and with improved tissue texture cristal of intrinsics again within visit along with similar reduction in symptoms with standing and walking.    Cont with POC 1x every other week with focus on DN and stretching primarily for foot and posterior chain / quad strengthening for knee pain and low back pain maintenance.    Sanjana Is progressing well towards her goals.   Pt prognosis is Fair.     Pt will continue to benefit from skilled outpatient physical therapy to address the deficits listed in the problem list box on initial evaluation, provide pt/family education and to maximize pt's level of independence in the home and community environment.     Pt's spiritual, cultural and educational needs considered and pt agreeable to plan of care and goals.    Anticipated barriers to physical therapy: BMI and chronicity    GOALS: Short Term Goals:  0-4 weeks  1.Report decreased Foot pain  < / =  4/10  to increase tolerance for ADLs  2. Increase ROM by 5-10 degrees where limited in order to perform ADLs without difficulty.  3. Increase strength by 1/3 MMT grade in intrinsics and hips to increase tolerance for ADL and work activities.  4. Pt to tolerate HEP to improve ROM and independence with ADL's     Long Term Goals: 5-8 weeks  1.Report decreased Foot and Low back pain < / = 2/10  to increase tolerance for ADLs  2.Patient goal: ADLs without complaint  3.Increase strength to 4+/5 in  intrinsics and hips  to increase  tolerance for ADL and work activities.  4. Pt will report at [facility] on FOTO  to demonstrate increase in LE function with every day tasks.     Plan     See POC in treatment section for evaluation    VICKY GARCIA, PT, DPT, OCS

## 2023-06-16 ENCOUNTER — OFFICE VISIT (OUTPATIENT)
Dept: PRIMARY CARE CLINIC | Facility: CLINIC | Age: 43
End: 2023-06-16
Payer: COMMERCIAL

## 2023-06-16 VITALS
HEART RATE: 93 BPM | SYSTOLIC BLOOD PRESSURE: 136 MMHG | DIASTOLIC BLOOD PRESSURE: 88 MMHG | HEIGHT: 63 IN | BODY MASS INDEX: 47.77 KG/M2 | OXYGEN SATURATION: 98 % | WEIGHT: 269.63 LBS

## 2023-06-16 DIAGNOSIS — E11.69 HYPERLIPIDEMIA DUE TO TYPE 2 DIABETES MELLITUS: ICD-10-CM

## 2023-06-16 DIAGNOSIS — E11.59 HYPERTENSION ASSOCIATED WITH DIABETES: ICD-10-CM

## 2023-06-16 DIAGNOSIS — D80.3 IGG DEFICIENCY: ICD-10-CM

## 2023-06-16 DIAGNOSIS — J45.21 MILD INTERMITTENT ASTHMA WITH ACUTE EXACERBATION: ICD-10-CM

## 2023-06-16 DIAGNOSIS — E78.5 HYPERLIPIDEMIA DUE TO TYPE 2 DIABETES MELLITUS: ICD-10-CM

## 2023-06-16 DIAGNOSIS — E11.9 TYPE 2 DIABETES MELLITUS WITHOUT COMPLICATION, WITHOUT LONG-TERM CURRENT USE OF INSULIN: ICD-10-CM

## 2023-06-16 DIAGNOSIS — I15.2 HYPERTENSION ASSOCIATED WITH DIABETES: ICD-10-CM

## 2023-06-16 DIAGNOSIS — F41.8 DEPRESSION WITH ANXIETY: ICD-10-CM

## 2023-06-16 DIAGNOSIS — E03.9 ACQUIRED HYPOTHYROIDISM: ICD-10-CM

## 2023-06-16 DIAGNOSIS — K59.09 OTHER CONSTIPATION: ICD-10-CM

## 2023-06-16 PROBLEM — M54.50 PAIN IN LOWER BACK: Status: RESOLVED | Noted: 2023-04-06 | Resolved: 2023-06-16

## 2023-06-16 PROBLEM — M25.552 PAIN IN LEFT HIP: Status: RESOLVED | Noted: 2023-04-06 | Resolved: 2023-06-16

## 2023-06-16 PROBLEM — M25.562 PAIN IN LEFT KNEE: Status: RESOLVED | Noted: 2023-04-06 | Resolved: 2023-06-16

## 2023-06-16 PROBLEM — M79.672 PAIN IN LEFT FOOT: Status: RESOLVED | Noted: 2023-04-06 | Resolved: 2023-06-16

## 2023-06-16 PROCEDURE — 99999 PR PBB SHADOW E&M-EST. PATIENT-LVL IV: ICD-10-PCS | Mod: PBBFAC,,, | Performed by: INTERNAL MEDICINE

## 2023-06-16 PROCEDURE — 3079F DIAST BP 80-89 MM HG: CPT | Mod: CPTII,S$GLB,, | Performed by: INTERNAL MEDICINE

## 2023-06-16 PROCEDURE — 99214 OFFICE O/P EST MOD 30 MIN: CPT | Mod: S$GLB,,, | Performed by: INTERNAL MEDICINE

## 2023-06-16 PROCEDURE — 99214 PR OFFICE/OUTPT VISIT, EST, LEVL IV, 30-39 MIN: ICD-10-PCS | Mod: S$GLB,,, | Performed by: INTERNAL MEDICINE

## 2023-06-16 PROCEDURE — 3008F PR BODY MASS INDEX (BMI) DOCUMENTED: ICD-10-PCS | Mod: CPTII,S$GLB,, | Performed by: INTERNAL MEDICINE

## 2023-06-16 PROCEDURE — 4010F ACE/ARB THERAPY RXD/TAKEN: CPT | Mod: CPTII,S$GLB,, | Performed by: INTERNAL MEDICINE

## 2023-06-16 PROCEDURE — 3075F SYST BP GE 130 - 139MM HG: CPT | Mod: CPTII,S$GLB,, | Performed by: INTERNAL MEDICINE

## 2023-06-16 PROCEDURE — 3075F PR MOST RECENT SYSTOLIC BLOOD PRESS GE 130-139MM HG: ICD-10-PCS | Mod: CPTII,S$GLB,, | Performed by: INTERNAL MEDICINE

## 2023-06-16 PROCEDURE — 4010F PR ACE/ARB THEARPY RXD/TAKEN: ICD-10-PCS | Mod: CPTII,S$GLB,, | Performed by: INTERNAL MEDICINE

## 2023-06-16 PROCEDURE — 1159F PR MEDICATION LIST DOCUMENTED IN MEDICAL RECORD: ICD-10-PCS | Mod: CPTII,S$GLB,, | Performed by: INTERNAL MEDICINE

## 2023-06-16 PROCEDURE — 1159F MED LIST DOCD IN RCRD: CPT | Mod: CPTII,S$GLB,, | Performed by: INTERNAL MEDICINE

## 2023-06-16 PROCEDURE — 99999 PR PBB SHADOW E&M-EST. PATIENT-LVL IV: CPT | Mod: PBBFAC,,, | Performed by: INTERNAL MEDICINE

## 2023-06-16 PROCEDURE — 3008F BODY MASS INDEX DOCD: CPT | Mod: CPTII,S$GLB,, | Performed by: INTERNAL MEDICINE

## 2023-06-16 PROCEDURE — 3079F PR MOST RECENT DIASTOLIC BLOOD PRESSURE 80-89 MM HG: ICD-10-PCS | Mod: CPTII,S$GLB,, | Performed by: INTERNAL MEDICINE

## 2023-06-16 RX ORDER — SEMAGLUTIDE 2.68 MG/ML
2 INJECTION, SOLUTION SUBCUTANEOUS
Qty: 9 ML | Refills: 3 | Status: SHIPPED | OUTPATIENT
Start: 2023-06-16 | End: 2024-06-15

## 2023-06-16 NOTE — ASSESSMENT & PLAN NOTE
A1C 6.1 in 12/2022, on Ozempic 1 mg weekly. Previously on janumet. No hypoglycemia.  Hasn't been checking glucoses recently.

## 2023-06-16 NOTE — ASSESSMENT & PLAN NOTE
Stable on Zoloft 100 mg and wellbutrin 300 mg daily.  No SI/HI/panic attacks.   Has been more quick tempered recently.    Was on lexapro in past, stopped 2/2 weight gain.  Viibryd caused vomiting.

## 2023-06-16 NOTE — ASSESSMENT & PLAN NOTE
Stable on crestor 20 mg daily, no myalgias.  The 10-year ASCVD risk score (Yuridia BECKETT, et al., 2019) is: 2.6%    Values used to calculate the score:      Age: 43 years      Sex: Female      Is Non- : No      Diabetic: Yes      Tobacco smoker: No      Systolic Blood Pressure: 136 mmHg      Is BP treated: Yes      HDL Cholesterol: 50 mg/dL      Total Cholesterol: 214 mg/dL

## 2023-06-16 NOTE — PROGRESS NOTES
[facility] Note    Chief Complaint      Chief Complaint   Patient presents with    Follow-up       History of Present Illness      Sanjana Queen is a 43 y.o. female who presents today for Annual preventative visit.  Patient comes to appointment alone.  GYN: Luan    Problem List Items Addressed This Visit       Mild intermittent asthma with acute exacerbation    Current Assessment & Plan     Sees Dr. Alexa Glover, allergist. Stable.         Hypothyroid    Current Assessment & Plan     TSH WNL 12/2022. Not on meds.          Depression with anxiety    Current Assessment & Plan     Stable on Zoloft 100 mg and wellbutrin 300 mg daily.  No SI/HI/panic attacks.   Has been more quick tempered recently.    Was on lexapro in past, stopped 2/2 weight gain.  Viibryd caused vomiting.          Other constipation    Current Assessment & Plan     Stable on linzess and milk of mag PRN.  Works well for her.         Type 2 diabetes mellitus without complication, without long-term current use of insulin    Current Assessment & Plan     A1C 6.1 in 12/2022, on Ozempic 1 mg weekly. Previously on janumet. No hypoglycemia.  Hasn't been checking glucoses recently.         Relevant Medications    semaglutide (OZEMPIC) 2 mg/dose (8 mg/3 mL) PnIj    Other Relevant Orders    Hemoglobin A1C    Lipid Panel    Hyperlipidemia due to type 2 diabetes mellitus    Current Assessment & Plan     Stable on crestor 20 mg daily, no myalgias.  The 10-year ASCVD risk score (Yuridia BECKETT, et al., 2019) is: 2.6%    Values used to calculate the score:      Age: 43 years      Sex: Female      Is Non- : No      Diabetic: Yes      Tobacco smoker: No      Systolic Blood Pressure: 136 mmHg      Is BP treated: Yes      HDL Cholesterol: 50 mg/dL      Total Cholesterol: 214 mg/dL           Relevant Medications    semaglutide (OZEMPIC) 2 mg/dose (8 mg/3 mL) PnIj    Hypertension associated with diabetes    Current Assessment  & Plan     BP controlled on valsartan 160 mg daily, verapamil 120 mg daily, no CP/SOB/HA.   Has been watching salt.         Relevant Medications    semaglutide (OZEMPIC) 2 mg/dose (8 mg/3 mL) PnIj    IgG deficiency    Current Assessment & Plan     Sees Alexa Glover, now on Xembify, levels have improved.          Other Visit Diagnoses       Postpartum cardiomyopathy    -  Primary    Relevant Orders    Echo              Health Maintenance   Topic Date Due    TETANUS VACCINE  Never done    Eye Exam  01/20/2023    Hemoglobin A1c  06/12/2023    Foot Exam  12/09/2023    Lipid Panel  12/12/2023    Mammogram  04/17/2024    Low Dose Statin  06/16/2024    Hepatitis C Screening  Completed       Past Medical History:   Diagnosis Date    Anemia     Asthma     Cardiomyopathy, peripartum     GERD (gastroesophageal reflux disease)     Hypertension     Hypothyroidism     not at present    Morbid obesity     Peripartum cardiomyopathy 7/28/2015    Snoring        Past Surgical History:   Procedure Laterality Date    BLADDER SURGERY      BREAST LUMPECTOMY Left 11/12/2021    BREAST SURGERY  2021    Removal of a keloid    DILATION AND CURETTAGE OF UTERUS      ENDOMETRIAL ABLATION      FRACTURE SURGERY      gastric sleeve N/A     HERNIA REPAIR  02/01/2020    Dr Luu    hernia repair 2/2020      INSERTION OF MIDURETHRAL SLING N/A 01/17/2023    Procedure: RETRO PUBIC SLING, MIDURETHRAL;  Surgeon: Sam Rice MD;  Location: [facility] OR [address] [place] [address];  Service: Urology;  Laterality: N/A;  90 min/ RETROPUBIC    spleen surgery      TONSILLECTOMY      TUBAL LIGATION         family history includes Arthritis in her mother; Cancer in her maternal grandmother and paternal aunt; Diabetes in her father, mother, and sister; Early death in her father; Heart disease in her father and maternal grandfather; Hypertension in her father, maternal grandfather, maternal grandmother, mother, and sister.    Social History     Tobacco Use    Smoking status:  Former     Packs/day: 1.00     Years: 15.00     Pack years: 15.00     Types: Cigarettes     Start date: 3/1/1998     Quit date: 2018     Years since quittin.7    Smokeless tobacco: Never   Substance Use Topics    Alcohol use: No    Drug use: No       Review of Systems   Constitutional:  Negative for chills and fever.   Respiratory:  Negative for cough and shortness of breath.    Cardiovascular:  Negative for chest pain and palpitations.   Gastrointestinal:  Negative for constipation, diarrhea, nausea and vomiting.   Genitourinary:  Negative for dysuria and hematuria.   Musculoskeletal:  Negative for falls.   Neurological:  Negative for headaches.      Outpatient Encounter Medications as of 2023   Medication Sig Dispense Refill    buPROPion (WELLBUTRIN XL) 300 MG 24 hr tablet Take 1 tablet (300 mg total) by mouth once daily. 90 tablet 3    celecoxib (CELEBREX) 200 MG capsule Take 1 capsule (200 mg total) by mouth 2 (two) times daily. 60 capsule 5    furosemide (LASIX) 40 MG tablet TAKE ONE AND ONE-HALF TABLETS DAILY AS NEEDED FOR SWELLING 135 tablet 3    HIZENTRA 10 gram/50 mL (20 %) Soln Inject 25 mg into the skin every 14 (fourteen) days. Fri      Lactobac no.41/Bifidobact no.7 (PROBIOTIC-10 ORAL)       lifitegrast (XIIDRA) 5 % Dpet Apply 1 drop to eye every 12 (twelve) hours. 180 each 4    linaCLOtide (LINZESS) 290 mcg Cap capsule Take 1 capsule (290 mcg total) by mouth once daily. 90 capsule 3    mupirocin calcium 2% (BACTROBAN) 2 % cream       olopatadine (PAZEO) 0.7 % Drop Place 1 drop into both eyes once daily. 2.5 mL 12    pantoprazole (PROTONIX) 40 MG tablet Take 1 tablet (40 mg total) by mouth once daily. 90 tablet 3    rosuvastatin (CRESTOR) 20 MG tablet Take 1 tablet (20 mg total) by mouth once daily. 90 tablet 3    sertraline (ZOLOFT) 100 MG tablet Take 1 tablet (100 mg total) by mouth once daily. 90 tablet 3    tiZANidine (ZANAFLEX) 4 MG tablet Take 4 mg by mouth every 6 (six) hours as  "needed.      triamcinolone acetonide 0.1% (KENALOG) 0.1 % cream MARQUEZ AA BID  0    valsartan (DIOVAN) 160 MG tablet Take 1 tablet (160 mg total) by mouth once daily. (Patient taking differently: Take 160 mg by mouth every evening.) 90 tablet 3    verapamiL (VERELAN) 120 MG C24P Take 1 capsule (120 mg total) by mouth once daily. (Patient taking differently: Take 120 mg by mouth every evening.) 90 capsule 3    [DISCONTINUED] semaglutide (OZEMPIC) 1 mg/dose (4 mg/3 mL) Inject 2 mg into the skin every 7 days. 6 pen 3    semaglutide (OZEMPIC) 2 mg/dose (8 mg/3 mL) PnIj Inject 2 mg into the skin every 7 days. 9 mL 3     No facility-administered encounter medications on file as of 6/16/2023.        Review of patient's allergies indicates:   Allergen Reactions    Doxycycline Anaphylaxis and Other (See Comments)       Physical Exam      Vital Signs  Pulse: 93  SpO2: 98 %  BP: 136/88  Pain Score: 0-No pain  Height and Weight  Height: 5' 3" (160 cm)  Weight: 122.3 kg (269 lb 10 oz)  BSA (Calculated - sq m): 2.33 sq meters  BMI (Calculated): 47.8  Weight in (lb) to have BMI = 25: 140.8]    Physical Exam  Constitutional:       Appearance: She is well-developed.   HENT:      Head: Normocephalic and atraumatic.   Cardiovascular:      Rate and Rhythm: Normal rate and regular rhythm.      Heart sounds: Normal heart sounds. No murmur heard.  Pulmonary:      Effort: Pulmonary effort is normal. No respiratory distress.      Breath sounds: Normal breath sounds.   Abdominal:      General: There is no distension.      Palpations: Abdomen is soft.      Tenderness: There is no abdominal tenderness. There is no guarding.   Skin:     General: Skin is warm and dry.   Neurological:      Mental Status: She is alert. Mental status is at baseline.   Psychiatric:         Behavior: Behavior normal.        Laboratory:  CBC:  No results for input(s): WBC, RBC, HGB, HCT, PLT, MCV, MCH, MCHC in the last 2160 hours.  CMP:  Recent Labs   Lab Result Units " 06/01/23  1638   Glucose mg/dL 77   Calcium mg/dL 9.0   Albumin g/dL 3.9   Total Protein g/dL 7.5   Sodium mmol/L 141   Potassium mmol/L 4.2   CO2 mmol/L 24   Chloride mmol/L 106   BUN mg/dL 11   Alkaline Phosphatase U/L 83   ALT U/L 25   AST U/L 18   Total Bilirubin mg/dL 0.4     URINALYSIS:  No results for input(s): COLORU, CLARITYU, SPECGRAV, PHUR, PROTEINUA, GLUCOSEU, BILIRUBINCON, BLOODU, WBCU, RBCU, BACTERIA, MUCUS, NITRITE, LEUKOCYTESUR, UROBILINOGEN, HYALINECASTS in the last 2160 hours.   LIPIDS:  No results for input(s): TSH, HDL, CHOL, TRIG, LDLCALC, CHOLHDL, NONHDLCHOL, TOTALCHOLEST in the last 2160 hours.  TSH:  No results for input(s): TSH in the last 2160 hours.  A1C:  No results for input(s): HGBA1C in the last 2160 hours.    Radiology:  No results found in the last 30 days.     Assessment/Plan     Sanjana Queen is a 43 y.o.female with:    1. Type 2 diabetes mellitus without complication, without long-term current use of insulin  - Hemoglobin A1C; Future  - Lipid Panel; Future  - semaglutide (OZEMPIC) 2 mg/dose (8 mg/3 mL) PnIj; Inject 2 mg into the skin every 7 days.  Dispense: 9 mL; Refill: 3    2. Acquired hypothyroidism    3. IgG deficiency    4. Hypertension associated with diabetes    5. Hyperlipidemia due to type 2 diabetes mellitus    6. Mild intermittent asthma with acute exacerbation    7. Depression with anxiety    8. Postpartum cardiomyopathy  - Echo; Future    9. Other constipation      -check A1C and lipids (last LDL was up from prior  -schedule eye exam  -Continue current medications and maintain follow up with specialists.    -Follow up in about 6 months (around 12/16/2023) for follow up of medical problems.       Gisselle Tavera MD  [facility]

## 2023-07-05 ENCOUNTER — LAB VISIT (OUTPATIENT)
Dept: LAB | Facility: HOSPITAL | Age: 43
End: 2023-07-05
Attending: INTERNAL MEDICINE
Payer: COMMERCIAL

## 2023-07-05 DIAGNOSIS — E11.9 TYPE 2 DIABETES MELLITUS WITHOUT COMPLICATION, WITHOUT LONG-TERM CURRENT USE OF INSULIN: ICD-10-CM

## 2023-07-05 LAB
CHOLEST SERPL-MCNC: 152 MG/DL (ref 120–199)
CHOLEST/HDLC SERPL: 3.6 {RATIO} (ref 2–5)
ESTIMATED AVG GLUCOSE: 108 MG/DL (ref 68–131)
HBA1C MFR BLD: 5.4 % (ref 4–5.6)
HDLC SERPL-MCNC: 42 MG/DL (ref 40–75)
HDLC SERPL: 27.6 % (ref 20–50)
LDLC SERPL CALC-MCNC: 84 MG/DL (ref 63–159)
NONHDLC SERPL-MCNC: 110 MG/DL
TRIGL SERPL-MCNC: 130 MG/DL (ref 30–150)

## 2023-07-05 PROCEDURE — 80061 LIPID PANEL: CPT | Performed by: INTERNAL MEDICINE

## 2023-07-05 PROCEDURE — 83036 HEMOGLOBIN GLYCOSYLATED A1C: CPT | Performed by: INTERNAL MEDICINE

## 2023-07-05 PROCEDURE — 36415 COLL VENOUS BLD VENIPUNCTURE: CPT | Performed by: INTERNAL MEDICINE

## 2023-07-06 ENCOUNTER — CLINICAL SUPPORT (OUTPATIENT)
Dept: REHABILITATION | Facility: HOSPITAL | Age: 43
End: 2023-07-06
Payer: COMMERCIAL

## 2023-07-06 DIAGNOSIS — M54.50 ACUTE LEFT-SIDED LOW BACK PAIN WITHOUT SCIATICA: ICD-10-CM

## 2023-07-06 DIAGNOSIS — M79.672 PAIN IN LEFT FOOT: Primary | ICD-10-CM

## 2023-07-06 DIAGNOSIS — M25.562 CHRONIC PAIN OF LEFT KNEE: ICD-10-CM

## 2023-07-06 DIAGNOSIS — G89.29 CHRONIC PAIN OF LEFT KNEE: ICD-10-CM

## 2023-07-06 DIAGNOSIS — M25.552 PAIN IN LEFT HIP: ICD-10-CM

## 2023-07-06 PROCEDURE — 97140 MANUAL THERAPY 1/> REGIONS: CPT

## 2023-07-06 PROCEDURE — 97110 THERAPEUTIC EXERCISES: CPT

## 2023-07-06 PROCEDURE — 97014 ELECTRIC STIMULATION THERAPY: CPT

## 2023-07-06 NOTE — PROGRESS NOTES
Physical Therapy Daily Treatment Note     Name: Sanjana Spencer [facility] Number: [id number]    Therapy Diagnosis:   Encounter Diagnoses   Name Primary?    Pain in left foot Yes    Pain in left hip     Chronic pain of left knee     Acute left-sided low back pain without sciatica      Physician: Murphy Haines,*    Visit Date: 7/6/2023  Physician Orders: PT Eval and Treat   Medical Diagnosis from Referral:      M53.3 (ICD-10-CM) - Sacroiliac dysfunction   M72.2 (ICD-10-CM) - Plantar fasciitis of left foot      Evaluation Date: 4/6/2023  Authorization Period Expiration: 04/03/2024  Plan of Care Expiration: 7/6/2023  Visit # / Visits authorized: 1/ 1, 9/20      Time In: 1455   Time Out: 1530  Total Appointment Time (timed & untimed codes): 35 minutes (2 MN, 1 TE, 1 Unattended E-stim)     Precautions: Standard    Subjective     Pt reports: Feels like foot and knee have improved more over time and with HEP compliance. Knee is about 70-80% better and L foot really only bothers her with first step in the morning. Was able to go to the beach this past week w/o complaint or aggravation as it would have in the past. Feels ready for DC with HEP.    FOTO IE - 62%  FOTO 5/22/23 - 67%   FOTO 7/6/23 - 99%  FOTO Goal - 69%    She was compliant with home exercise program.  Response to previous treatment: relief for 2-3 days  Functional change: no changes    Pain: 1-3/10  Location: left back , buttocks , feet , and knee      Objective     Lower Extremity Strength at IE:  Right LE   Left LE     Quadriceps: SLR - WFL Quadriceps: SLR - WFL   Hamstrings: 4+/5 Hamstrings: 3+/5   Hip Extension:  4/5 Hip Extension: 3+/5   Hip ABD: 4-/5 Hip ABD: 3/5     Ankle Right Left   1st MTP Ext 4+/5 3+/5   1st MTP Flexion 4+/5 4/5 * pain -> no pain today 6/1/23   Dorsiflexion 5/5 5/5   Plantarflexion np np   Inversion 4/5 4-/5   Eversion 4+/5 4-/5     L/S: ROM WNL, but pain with flexed to extended position; hips as on emily Allan  received therapeutic exercises to develop strength, endurance, ROM, flexibility, posture, and core stabilization for 10 minutes including:       Patient education:  - POC discussion  - use of night splint if needed  - HEP review (focus on stretching for L ankle; quad strength for L knee)  - answered all patient questions prior to DC      Sanjana received the following manual therapy techniques: DN were applied to the: syl ankle / foot for 25 minutes, including:    Assessment of TrP    Dry Needling:  Patient educated on intervention of dry needling including risks, benefits, and post administration expectations including potential soreness, bruising, and fatigue. Patient demonstrated verbal understanding and consented to intervention today.    DN performed to L post tib, achilles tendon fascial insertion, and intrinsic TrP for 27 min with manual stimulation and electrical stimulation.      Home Exercises Provided and Patient Education Provided     Education provided:   - see above    Written Home Exercises Provided: yes.  Exercises were reviewed and Sanjana was able to demonstrate them prior to the end of the session.  Sanjana demonstrated good  understanding of the education provided.     See EMR under Patient Instructions for exercises provided  PF stretch, ASLR, 90/90 or sidesteps .    Assessment     FOTO scores demonstrates significant improvement in function since start of care. Subjectively patient feels like she is ready for DC and has HEP to help manage and further improve symptoms. Secondary to progress patient is DC with HEP going forward, but advised to reach out if problems worsen or persist.    Sanjana Is progressing well towards her goals.   Pt prognosis is Fair.     Pt will continue to benefit from skilled outpatient physical therapy to address the deficits listed in the problem list box on initial evaluation, provide pt/family education and to maximize pt's level of independence in the home and  community environment.     Pt's spiritual, cultural and educational needs considered and pt agreeable to plan of care and goals.    Anticipated barriers to physical therapy: BMI and chronicity    GOALS: Short Term Goals:  0-4 weeks  1.Report decreased Foot pain  < / =  4/10  to increase tolerance for ADLs - MET  2. Increase ROM by 5-10 degrees where limited in order to perform ADLs without difficulty. - MET  3. Increase strength by 1/3 MMT grade in intrinsics and hips to increase tolerance for ADL and work activities. - MET  4. Pt to tolerate HEP to improve ROM and independence with ADL's - MET     Long Term Goals: 5-8 weeks  1.Report decreased Foot and Low back pain < / = 2/10  to increase tolerance for ADLs - MET  2.Patient goal: ADLs without complaint - IN PROGRESS  3.Increase strength to 4+/5 in  intrinsics and hips  to increase tolerance for ADL and work activities. - MET  4. Pt will report at [facility] on FOTO  to demonstrate increase in LE function with every day tasks. - MET    Plan     See POC in treatment section for evaluation    VICKY GARCIA, PT, DPT, OCS

## 2023-07-14 ENCOUNTER — PATIENT MESSAGE (OUTPATIENT)
Dept: PRIMARY CARE CLINIC | Facility: CLINIC | Age: 43
End: 2023-07-14
Payer: COMMERCIAL

## 2023-07-28 ENCOUNTER — PATIENT MESSAGE (OUTPATIENT)
Dept: PRIMARY CARE CLINIC | Facility: CLINIC | Age: 43
End: 2023-07-28
Payer: COMMERCIAL

## 2023-07-28 RX ORDER — ALPRAZOLAM 0.25 MG/1
0.25 TABLET ORAL 2 TIMES DAILY PRN
Qty: 60 TABLET | Refills: 0 | Status: SHIPPED | OUTPATIENT
Start: 2023-07-28 | End: 2023-08-27

## 2023-08-29 ENCOUNTER — PATIENT MESSAGE (OUTPATIENT)
Dept: PRIMARY CARE CLINIC | Facility: CLINIC | Age: 43
End: 2023-08-29
Payer: COMMERCIAL

## 2023-08-29 DIAGNOSIS — F41.8 DEPRESSION WITH ANXIETY: ICD-10-CM

## 2023-08-29 RX ORDER — TIZANIDINE 4 MG/1
4 TABLET ORAL EVERY 6 HOURS
Qty: 90 TABLET | Refills: 3 | Status: SHIPPED | OUTPATIENT
Start: 2023-08-29

## 2023-08-29 RX ORDER — CELECOXIB 200 MG/1
200 CAPSULE ORAL 2 TIMES DAILY
Qty: 90 CAPSULE | Refills: 5 | Status: SHIPPED | OUTPATIENT
Start: 2023-08-29

## 2023-08-29 RX ORDER — SERTRALINE HYDROCHLORIDE 100 MG/1
150 TABLET, FILM COATED ORAL DAILY
Qty: 45 TABLET | Refills: 11 | Status: SHIPPED | OUTPATIENT
Start: 2023-08-29 | End: 2024-08-28

## 2023-09-19 ENCOUNTER — PATIENT MESSAGE (OUTPATIENT)
Dept: PRIMARY CARE CLINIC | Facility: CLINIC | Age: 43
End: 2023-09-19
Payer: COMMERCIAL

## 2023-09-25 NOTE — PATIENT INSTRUCTIONS
- Rest.    - Drink plenty of fluids.    - Tylenol or Ibuprofen as directed as needed for fever/pain.    - Follow up with your PCP or specialty clinic as directed in the next 1-2 weeks if not improved or as needed.  You can call [phone] to schedule an appointment with the appropriate provider.    - Go to the ED if your symptoms worsen.  - You must understand that you have received an Urgent Care treatment only and that you may be released before all of your medical problems are known or treated.   - You, the patient, will arrange for follow up care as instructed.   - If your condition worsens or fails to improve we recommend that you receive another evaluation at the ER immediately or contact your PCP to discuss your concerns or return here.      Allergic Rhinitis  Allergic rhinitis is an allergic reaction that affects the nose, and often the eyes. Its often known as nasal allergies. Nasal allergies are often due to things in the environment that are breathed in. Depending what you are sensitive to, nasal allergies may occur only during certain seasons. Or they may occur year round. Common indoor allergens include house dust mites, mold, cockroaches, and pet dander. Outdoor allergens include pollen from trees, grasses, and weeds.   Symptoms include a drippy, stuffy, and itchy nose. They also include sneezing and red and itchy eyes. You may feel tired more often. Severe allergies may also affect your breathing and trigger a condition called asthma.   Tests can be done to see what allergens are affecting you. You may be referred to an allergy specialist for testing and further evaluation.  Home care  Your healthcare provider may prescribe medicines to help relieve allergy symptoms. These may include oral medicines, nasal sprays, or eye drops.  Ask your provider for advice on how to avoid substances that you are allergic to. Below are a few tips for each type of allergen.  Pet dander:  · Do not have pets with  fur and feathers.  · If you can't avoid having a pet, keep it out of your bedroom and off upholstered furniture.  Pollen:  · When pollen counts are high, keep windows of your car and home closed. If possible, use an air conditioner instead.  · Wear a filter mask when mowing or doing yard work.  House dust mites:  · Wash bedding every week in warm water and detergent and dry on a hot setting.  · Cover the mattress, box spring, and pillows with allergy covers.   · If possible, sleep in a room with no carpet, curtains, or upholstered furniture.  Cockroaches:  · Store food in sealed containers.  · Remove garbage from the home promptly.  · Fix water leaks  Mold:  · Keep humidity low by using a dehumidifier or air conditioner. Keep the dehumidifier and air conditioner clean and free of mold.  · Clean moldy areas with bleach and water.  In general:  · Vacuum once or twice a week. If possible, use a vacuum with a high-efficiency particulate air (HEPA) filter.  · Do not smoke. Avoid cigarette smoke. Cigarette smoke is an irritant that can make symptoms worse.  Follow-up care  Follow up as advised by the healthcare provider or our staff. If you were referred to an allergy specialist, make this appointment promptly.  When to seek medical advice  Call your healthcare provider right away if the following occur:  · Coughing or wheezing  · Fever greater than 100.4°F (38°C)  · Hives (raised red bumps)  · Continuing symptoms, new symptoms, or worsening symptoms  Call 911 right away if you have:  · Trouble breathing  · Severe swelling of the face or severe itching of the eyes or mouth  Date Last Reviewed: 3/1/2017  © 7541-3453 [employer]. [address] [place] [address], [place] [address]. All rights reserved. This information is not intended as a substitute for professional medical care. Always follow your healthcare professional's instructions.        Nasal Allergies: Related Problems  Allergies can cause nasal passages to  swell. This narrows the air passages. Allergies also cause increased mucus production in the nose. These changes result in nasal allergy symptoms. Common symptoms include itching, sneezing, stuffy nose, and runny nose. Nasal allergies can also cause problems in other parts of the respiratory system. Some of the more common problems are discussed below. If you think you have any of these problems, talk to your healthcare provider about treatment choices.    Sinus infections  Fluid may be trapped in the sinuses. Bacteria may grow in trapped fluid. This causes sinus infection (sinusitis).  Conjunctivitis  Allergens irritate your eyes, including the lining of the conjunctiva. This causes eyes to become red, itchy, puffy, and watery.  Ear problems  The eustachian tube connects the middle ear to nasal passages.  Allergies can block this tube, and make the ears feel plugged. Fluid may also build up, leading to an ear infection (otitis media).  Nasal polyps  Allergies cause nasal passages to swell. Constant swelling can lead to formation of a sac called a polyp. Polyps can grow large enough to block nasal passages.  Asthma  Asthma is inflammation and swelling of the air passages in the lungs. The symptoms are wheezing, shortness of breath, coughing, and chest tightness. Allergies, including nasal allergies, are common in people with asthma.  Date Last Reviewed: 9/1/2016  © 6204-3041 [employer]. [address] [place] [address], [place] [address]. All rights reserved. This information is not intended as a substitute for professional medical care. Always follow your healthcare professional's instructions.         Opioid Counseling: The patient was advised that opioid pain medications are addictive, can cause nausea, and vomiting, and are rarely needed after Mohs surgery. Proper use of acetaminophen, ibuprofen, and ice is usually sufficient.

## 2023-12-13 ENCOUNTER — HOSPITAL ENCOUNTER (OUTPATIENT)
Dept: RADIOLOGY | Facility: HOSPITAL | Age: 43
Discharge: HOME | End: 2023-12-13
Attending: PODIATRIST
Payer: COMMERCIAL

## 2023-12-13 ENCOUNTER — OFFICE VISIT (OUTPATIENT)
Dept: PODIATRY | Facility: CLINIC | Age: 43
End: 2023-12-13
Payer: COMMERCIAL

## 2023-12-13 DIAGNOSIS — E11.9 TYPE 2 DIABETES MELLITUS WITHOUT COMPLICATION, WITHOUT LONG-TERM CURRENT USE OF INSULIN: Primary | ICD-10-CM

## 2023-12-13 DIAGNOSIS — M79.671 ACUTE FOOT PAIN, RIGHT: ICD-10-CM

## 2023-12-13 DIAGNOSIS — E11.9 ENCOUNTER FOR DIABETIC FOOT EXAM: ICD-10-CM

## 2023-12-13 DIAGNOSIS — R60.0 EDEMA OF FOOT: ICD-10-CM

## 2023-12-13 PROCEDURE — 1159F MED LIST DOCD IN RCRD: CPT | Mod: CPTII,S$GLB,, | Performed by: PODIATRIST

## 2023-12-13 PROCEDURE — 99999 PR PBB SHADOW E&M-EST. PATIENT-LVL IV: CPT | Mod: PBBFAC,,, | Performed by: PODIATRIST

## 2023-12-13 PROCEDURE — 73630 X-RAY EXAM OF FOOT: CPT | Mod: 26,RT,, | Performed by: RADIOLOGY

## 2023-12-13 PROCEDURE — 99204 OFFICE O/P NEW MOD 45 MIN: CPT | Mod: S$GLB,,, | Performed by: PODIATRIST

## 2023-12-13 PROCEDURE — 4010F ACE/ARB THERAPY RXD/TAKEN: CPT | Mod: CPTII,S$GLB,, | Performed by: PODIATRIST

## 2023-12-13 PROCEDURE — 4010F PR ACE/ARB THEARPY RXD/TAKEN: ICD-10-PCS | Mod: CPTII,S$GLB,, | Performed by: PODIATRIST

## 2023-12-13 PROCEDURE — 3044F HG A1C LEVEL LT 7.0%: CPT | Mod: CPTII,S$GLB,, | Performed by: PODIATRIST

## 2023-12-13 PROCEDURE — 1159F PR MEDICATION LIST DOCUMENTED IN MEDICAL RECORD: ICD-10-PCS | Mod: CPTII,S$GLB,, | Performed by: PODIATRIST

## 2023-12-13 PROCEDURE — 99999 PR PBB SHADOW E&M-EST. PATIENT-LVL IV: ICD-10-PCS | Mod: PBBFAC,,, | Performed by: PODIATRIST

## 2023-12-13 PROCEDURE — 99204 PR OFFICE/OUTPT VISIT, NEW, LEVL IV, 45-59 MIN: ICD-10-PCS | Mod: S$GLB,,, | Performed by: PODIATRIST

## 2023-12-13 PROCEDURE — 73630 X-RAY EXAM OF FOOT: CPT | Mod: TC,RT

## 2023-12-13 PROCEDURE — 73630 XR FOOT COMPLETE 3 VIEW RIGHT: ICD-10-PCS | Mod: 26,RT,, | Performed by: RADIOLOGY

## 2023-12-13 PROCEDURE — 1160F PR REVIEW ALL MEDS BY PRESCRIBER/CLIN PHARMACIST DOCUMENTED: ICD-10-PCS | Mod: CPTII,S$GLB,, | Performed by: PODIATRIST

## 2023-12-13 PROCEDURE — 3044F PR MOST RECENT HEMOGLOBIN A1C LEVEL <7.0%: ICD-10-PCS | Mod: CPTII,S$GLB,, | Performed by: PODIATRIST

## 2023-12-13 PROCEDURE — 1160F RVW MEDS BY RX/DR IN RCRD: CPT | Mod: CPTII,S$GLB,, | Performed by: PODIATRIST

## 2023-12-13 RX ORDER — ALBUTEROL SULFATE 90 UG/1
AEROSOL, METERED RESPIRATORY (INHALATION)
COMMUNITY
Start: 2023-08-03

## 2023-12-13 RX ORDER — CLOBETASOL PROPIONATE 0.5 MG/G
AEROSOL, FOAM TOPICAL
COMMUNITY
Start: 2023-09-19

## 2023-12-13 RX ORDER — CICLOPIROX 1 G/100ML
SHAMPOO TOPICAL
COMMUNITY
Start: 2023-11-01

## 2023-12-13 RX ORDER — CELECOXIB 200 MG/1
1 CAPSULE ORAL 2 TIMES DAILY
COMMUNITY

## 2023-12-13 RX ORDER — KETOCONAZOLE 20 MG/ML
SHAMPOO, SUSPENSION TOPICAL
COMMUNITY
Start: 2023-08-29

## 2023-12-13 RX ORDER — HUMAN IMMUNOGLOBULIN G 0.2 G/ML
LIQUID SUBCUTANEOUS
COMMUNITY
Start: 2023-11-22

## 2023-12-13 RX ORDER — ROPINIROLE 0.25 MG/1
TABLET, FILM COATED ORAL
COMMUNITY

## 2023-12-13 RX ORDER — CLOBETASOL PROPIONATE 0.05 G/100ML
SHAMPOO TOPICAL
COMMUNITY
Start: 2023-09-19

## 2023-12-13 RX ORDER — PREDNISONE 20 MG/1
TABLET ORAL
COMMUNITY
Start: 2023-08-17

## 2023-12-13 RX ORDER — ALBUTEROL SULFATE 90 UG/1
AEROSOL, METERED RESPIRATORY (INHALATION)
COMMUNITY

## 2023-12-13 RX ORDER — SEMAGLUTIDE 1.34 MG/ML
INJECTION, SOLUTION SUBCUTANEOUS
COMMUNITY
Start: 2023-10-01

## 2023-12-13 RX ORDER — IBUPROFEN 100 MG/5ML
SUSPENSION, ORAL (FINAL DOSE FORM) ORAL
COMMUNITY

## 2023-12-13 RX ORDER — SERTRALINE HYDROCHLORIDE 100 MG/1
2 TABLET, FILM COATED ORAL DAILY
COMMUNITY
Start: 2023-12-07 | End: 2024-03-06

## 2023-12-13 RX ORDER — ROSUVASTATIN CALCIUM 5 MG/1
TABLET, COATED ORAL
COMMUNITY

## 2023-12-13 RX ORDER — VERAPAMIL HYDROCHLORIDE 120 MG/1
1 TABLET, FILM COATED, EXTENDED RELEASE ORAL NIGHTLY
COMMUNITY

## 2023-12-13 RX ORDER — VALSARTAN 80 MG/1
TABLET ORAL
COMMUNITY

## 2023-12-13 RX ORDER — ALBUTEROL SULFATE 0.83 MG/ML
SOLUTION RESPIRATORY (INHALATION)
COMMUNITY
Start: 2023-08-03

## 2023-12-13 RX ORDER — VALSARTAN 320 MG/1
320 TABLET ORAL
COMMUNITY
Start: 2023-12-07 | End: 2023-12-28

## 2023-12-13 RX ORDER — PANTOPRAZOLE SODIUM 40 MG/1
1 TABLET, DELAYED RELEASE ORAL 2 TIMES DAILY
COMMUNITY
Start: 2023-12-07 | End: 2024-12-06

## 2023-12-13 RX ORDER — ROSUVASTATIN CALCIUM 20 MG/1
1 TABLET, COATED ORAL DAILY
COMMUNITY

## 2023-12-13 RX ORDER — FUROSEMIDE 40 MG/1
1 TABLET ORAL 2 TIMES DAILY
COMMUNITY

## 2023-12-13 NOTE — PROGRESS NOTES
Subjective:      Patient ID: Sanjana Queen is a 43 y.o. female.    Chief Complaint:   Foot Pain (Right foot (knot on top)) and Diabetes Mellitus (PCP- 12/07/2023/Dylan Stuart ([facility] [place] [facility]))    Sanjana is a 43 y.o. female who presents to the clinic upon referral from Dr. Herndon  for evaluation and treatment of diabetic feet. Sanjana has a past medical history of Anemia, Asthma, Cardiomyopathy, peripartum, GERD (gastroesophageal reflux disease), Hypertension, Hypothyroidism, Morbid obesity, Peripartum cardiomyopathy (7/28/2015), and Snoring. Patient relates no major problem with feet. Only complaints today consist of right foot pain.  No weakness  No pain with driving    Hx p.fascitis and ankle pain/instability treated by ortho 2022  Patient relates the right foot and ankle pain is still there    She relates a history of left Achilles tendon tightening surgery  History of physical therapy helped left heel pain    Her mother has been going through some health issues and she has been caretaker is quite possible that the wheelchair rolled over her foot once or twice    Patient relates that they top of her right foot started having a lump bump on it she had some discomfort but denies any specific injury  She did change her shoes and been icing it which somewhat helped    History of left knee issues    Recent Hemoglobin is 5.7 well-controlled        PCP: Gisselle Tavera MD    Date Last Seen by PCP: 12/13/23    Current shoe gear: Slip-on shoes    Hemoglobin A1C   Date Value Ref Range Status   07/05/2023 5.4 4.0 - 5.6 % Final     Comment:     ADA Screening Guidelines:  5.7-6.4%  Consistent with prediabetes  >or=6.5%  Consistent with diabetes    High levels of fetal hemoglobin interfere with the HbA1C  assay. Heterozygous hemoglobin variants (HbS, HgC, etc)do  not significantly interfere with this assay.   However, presence of multiple variants may affect accuracy.     12/12/2022 6.1 (H) <5.7 % of total Hgb Final      Comment:     For someone without known diabetes, a hemoglobin   A1c value between 5.7% and 6.4% is consistent with  prediabetes and should be confirmed with a   follow-up test.     For someone with known diabetes, a value <7%  indicates that their diabetes is well controlled. A1c  targets should be individualized based on duration of  diabetes, age, comorbid conditions, and other  considerations.     This assay result is consistent with an increased risk  of diabetes.     Currently, no consensus exists regarding use of  hemoglobin A1c for diagnosis of diabetes for children.        03/18/2022 5.4 4.0 - 5.6 % Final     Comment:     ADA Screening Guidelines:  5.7-6.4%  Consistent with prediabetes  >or=6.5%  Consistent with diabetes    High levels of fetal hemoglobin interfere with the HbA1C  assay. Heterozygous hemoglobin variants (HbS, HgC, etc)do  not significantly interfere with this assay.   However, presence of multiple variants may affect accuracy.            Past Medical History:   Diagnosis Date    Anemia     Asthma     Cardiomyopathy, peripartum     GERD (gastroesophageal reflux disease)     Hypertension     Hypothyroidism     not at present    Morbid obesity     Peripartum cardiomyopathy 7/28/2015    Snoring      Past Surgical History:   Procedure Laterality Date    BLADDER SURGERY      BREAST LUMPECTOMY Left 11/12/2021    BREAST SURGERY  2021    Removal of a keloid    DILATION AND CURETTAGE OF UTERUS      ENDOMETRIAL ABLATION      FRACTURE SURGERY      gastric sleeve N/A     HERNIA REPAIR  02/01/2020    Dr Luu    hernia repair 2/2020      INSERTION OF MIDURETHRAL SLING N/A 01/17/2023    Procedure: RETRO PUBIC SLING, MIDURETHRAL;  Surgeon: Sam Rice MD;  Location: [facility] OR [address] [place] [address];  Service: Urology;  Laterality: N/A;  90 min/ RETROPUBIC    spleen surgery      TONSILLECTOMY      TUBAL LIGATION       Current Outpatient Medications on File Prior to Visit   Medication Sig Dispense Refill     albuterol (PROVENTIL) 2.5 mg /3 mL (0.083 %) nebulizer solution       albuterol (PROVENTIL/VENTOLIN HFA) 90 mcg/actuation inhaler       albuterol (PROVENTIL/VENTOLIN HFA) 90 mcg/actuation inhaler 2 puffs as needed Inhalation PRN      ascorbic acid, vitamin C, (VITAMIN C) 1000 MG tablet Orally Once a day      celecoxib (CELEBREX) 200 MG capsule Take 1 capsule (200 mg total) by mouth 2 (two) times daily. 90 capsule 5    celecoxib (CELEBREX) 200 MG capsule Take 1 capsule by mouth 2 (two) times daily.      ciclopirox 1 % shampoo Apply topically twice a week.      clobetasoL (CLOBEX) 0.05 % shampoo Apply topically every 7 days.      furosemide (LASIX) 40 MG tablet TAKE ONE AND ONE-HALF TABLETS DAILY AS NEEDED FOR SWELLING 135 tablet 3    furosemide (LASIX) 40 MG tablet Take 1 tablet by mouth 2 (two) times daily.      HIZENTRA 1 gram/5 mL (20 %) Soln Inject into the skin.      HIZENTRA 10 gram/50 mL (20 %) Soln Inject 25 mg into the skin every 14 (fourteen) days. Fri      ketoconazole (NIZORAL) 2 % shampoo       Lactobac no.41/Bifidobact no.7 (PROBIOTIC-10 ORAL)       lifitegrast (XIIDRA) 5 % Dpet Apply 1 drop to eye every 12 (twelve) hours. 180 each 4    linaCLOtide (LINZESS) 290 mcg Cap capsule Take 1 capsule (290 mcg total) by mouth once daily. 90 capsule 3    mupirocin calcium 2% (BACTROBAN) 2 % cream       olopatadine (PAZEO) 0.7 % Drop Place 1 drop into both eyes once daily. 2.5 mL 12    OZEMPIC 1 mg/dose (4 mg/3 mL) Inject into the skin.      pantoprazole (PROTONIX) 40 MG tablet Take 1 tablet (40 mg total) by mouth once daily. 90 tablet 3    pantoprazole (PROTONIX) 40 MG tablet Take 1 tablet by mouth 2 (two) times daily.      predniSONE (DELTASONE) 20 MG tablet       rOPINIRole (REQUIP) 0.25 MG tablet Take by mouth.      rosuvastatin (CRESTOR) 20 MG tablet Take 1 tablet (20 mg total) by mouth once daily. 90 tablet 3    rosuvastatin (CRESTOR) 20 MG tablet Take 1 tablet by mouth once daily.      rosuvastatin  (CRESTOR) 5 MG tablet 1 tablet Orally Once a day      semaglutide (OZEMPIC) 2 mg/dose (8 mg/3 mL) PnIj Inject 2 mg into the skin every 7 days. 9 mL 3    sertraline (ZOLOFT) 100 MG tablet Take 1.5 tablets (150 mg total) by mouth once daily. 45 tablet 11    sertraline (ZOLOFT) 100 MG tablet Take 2 tablets by mouth once daily.      tiZANidine (ZANAFLEX) 4 MG tablet Take 1 tablet (4 mg total) by mouth every 6 (six) hours. 90 tablet 3    TOVET EMOLLIENT 0.05 % topical foam Apply topically.      triamcinolone acetonide 0.1% (KENALOG) 0.1 % cream MARQUEZ AA BID  0    valsartan (DIOVAN) 160 MG tablet Take 1 tablet (160 mg total) by mouth once daily. (Patient taking differently: Take 160 mg by mouth every evening.) 90 tablet 3    valsartan (DIOVAN) 320 MG tablet Take 320 mg by mouth.      valsartan (DIOVAN) 80 MG tablet 1 tablet Orally Twice a day      verapamiL (CALAN-SR) 120 MG CR tablet Take 1 tablet by mouth every evening.      verapamiL (VERELAN) 120 MG C24P Take 1 capsule (120 mg total) by mouth once daily. (Patient taking differently: Take 120 mg by mouth every evening.) 90 capsule 3    ALPRAZolam (XANAX) 0.25 MG tablet Take 1 tablet (0.25 mg total) by mouth 2 (two) times daily as needed for Anxiety. 60 tablet 0    buPROPion (WELLBUTRIN XL) 300 MG 24 hr tablet Take 1 tablet (300 mg total) by mouth once daily. 90 tablet 3     No current facility-administered medications on file prior to visit.     Review of patient's allergies indicates:   Allergen Reactions    Doxycycline Anaphylaxis and Other (See Comments)       Review of Systems   Constitutional: Negative for chills, decreased appetite, fever, malaise/fatigue, night sweats, weight gain and weight loss.   Cardiovascular:  Negative for chest pain, claudication, dyspnea on exertion, leg swelling, palpitations and syncope.   Respiratory:  Negative for cough and shortness of breath.    Endocrine: Negative for cold intolerance and heat intolerance.   Hematologic/Lymphatic:  Negative for bleeding problem. Does not bruise/bleed easily.   Skin:  Negative for color change, dry skin, flushing, itching, nail changes, poor wound healing, rash, skin cancer, suspicious lesions and unusual hair distribution.   Musculoskeletal:  Negative for arthritis, back pain, falls, gout, joint pain, joint swelling, muscle cramps, muscle weakness, myalgias, neck pain and stiffness.        Foot pain   Gastrointestinal:  Negative for diarrhea, nausea and vomiting.   Neurological:  Negative for dizziness, focal weakness, light-headedness, numbness, paresthesias, tremors, vertigo and weakness.   Psychiatric/Behavioral:  Negative for altered mental status and depression. The patient does not have insomnia.    Allergic/Immunologic: Negative.            Objective:       There were no vitals filed for this visit.      Physical Exam  Vitals reviewed.   Constitutional:       General: She is not in acute distress.     Appearance: She is well-developed. She is not ill-appearing, toxic-appearing or diaphoretic.      Comments: Proper supportive shoegear      Cardiovascular:      Pulses:           Dorsalis pedis pulses are 2+ on the right side and 2+ on the left side.        Posterior tibial pulses are 2+ on the right side and 2+ on the left side.   Musculoskeletal:      Right lower leg: No edema.      Left lower leg: No edema.      Right ankle: Normal. No swelling.      Right Achilles Tendon: Normal. No tenderness.      Left ankle: Normal. No swelling.      Left Achilles Tendon: Normal. No tenderness.      Right foot: Decreased range of motion. Deformity, prominent metatarsal heads, tenderness and bony tenderness present.      Left foot: Decreased range of motion. Deformity and prominent metatarsal heads present. No tenderness or bony tenderness.      Comments: Decrease rom     + pop right midfoot forefoot near the 4th metatarsal mild vibratory pain    No pain with range of motion   Feet:      Right foot:      Protective  Sensation: 10 sites tested.  10 sites sensed.      Skin integrity: No ulcer, blister, skin breakdown, erythema, warmth, callus or dry skin.      Toenail Condition: Right toenails are normal.      Left foot:      Protective Sensation: 10 sites tested.  10 sites sensed.      Skin integrity: No ulcer, blister, skin breakdown, erythema, warmth, callus or dry skin.      Toenail Condition: Left toenails are normal.      Comments: Swm intact  Skin:     General: Skin is warm.      Capillary Refill: Capillary refill takes 2 to 3 seconds.      Coloration: Skin is not pale.      Findings: No erythema or rash.   Neurological:      Mental Status: She is alert and oriented to person, place, and time.      Sensory: No sensory deficit.      Gait: Gait is intact.   Psychiatric:         Attention and Perception: Attention normal.         Mood and Affect: Mood normal.         Speech: Speech normal.         Behavior: Behavior normal.         Thought Content: Thought content normal.         Cognition and Memory: Cognition normal.         Judgment: Judgment normal.               Assessment:       Encounter Diagnoses   Name Primary?    Type 2 diabetes mellitus without complication, without long-term current use of insulin Yes    Acute foot pain, right     Edema of foot     Encounter for diabetic foot exam          Plan:       Sanjana was seen today for foot pain and diabetes mellitus.    Diagnoses and all orders for this visit:    Type 2 diabetes mellitus without complication, without long-term current use of insulin    Acute foot pain, right  -     X-Ray Foot Complete Right; Future  -     HME - OTHER    Edema of foot  -     HME - OTHER    Encounter for diabetic foot exam      I counseled the patient on her conditions, their implications and medical management.    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      Consider physical therapy/ MRI          Follow up in about 4 weeks (around 1/10/2024).

## 2023-12-15 ENCOUNTER — TELEPHONE (OUTPATIENT)
Dept: PODIATRY | Facility: CLINIC | Age: 43
End: 2023-12-15
Payer: COMMERCIAL

## 2023-12-15 NOTE — TELEPHONE ENCOUNTER
Tamara Lei, JONES CHAVARRIA Staff  Please call and let patient know good news there is no fracture however would like her to continue wearing the boot for at least 2 weeks to see if her pain resolves      Patient was give message and verbal with understanding.

## 2024-02-14 NOTE — PATIENT INSTRUCTIONS
Neck Sprain or Strain  A sudden force that causes turning or bending of the neck can cause sprain or strain. An example would be the force from a car accident. This can stretch or tear muscles called a strain. It can also stretch or tear ligaments called a sprain. Either of these can cause neck pain. Sometimes neck pain occurs after a simple awkward movement. In either case, muscle spasm is commonly present and contributes to the pain.     Unless you had a forceful physical injury (for example, a car accident or fall), X-rays are usually not ordered for the initial evaluation of neck pain. If pain continues and dose not respond to medical treatment, X-rays and other tests may be performed at a later time.  Home care  · You may feel more soreness and spasm the first few days after the injury. Rest until symptoms begin to improve.  · When lying down, use a comfortable pillow or a rolled towel that supports the head and keeps the spine in a neutral position. The position of the head should not be tilted forward or backward.  · Apply an ice pack over the injured area for 15 to 20 minutes every 3 to 6 hours. You should do this for the first 24 to 48 hours. You can make an ice pack by filling a plastic bag that seals at the top with ice cubes and then wrapping it with a thin towel. After 48 hours, apply heat (warm shower or warm bath) for 15 to 20 minutes several times a day, or alternate ice and heat.  · You may use over-the-counter pain medicine to control pain, unless another pain medicine was prescribed. If you have chronic liver or kidney disease or ever had a stomach ulcer or GI bleeding, talk with your healthcare provider before using these medicines.  · If a soft cervical collar was prescribed, it should be worn only for periods of increased pain. It should not be worn for more than 3 hours a day, or for a period longer than 1 to 2 weeks.  Follow-up care  Follow up with your healthcare provider as directed.  Physical therapy may be needed.  Sometimes fractures dont show up on the first X-ray. Bruises and sprains can sometimes hurt as much as a fracture. These injuries can take time to heal completely. If your symptoms dont improve or they get worse, talk with your healthcare provider. You may need a repeat X-ray or other tests. If X-rays were taken, you will be told of any new findings that may affect your care.  Call 911  Call 911 if you have:  · Neck swelling, difficulty or painful swallowing  · Difficulty breathing  · Chest pain  When to seek medical advice  Call your healthcare provider right away if any of these occur:  · Pain becomes worse or spreads into your arms  · Weakness or numbness in one or both arms  Date Last Reviewed: 11/19/2015  © 6623-7050 [employer]. [address] [place] [address], [place] [address]. All rights reserved. This information is not intended as a substitute for professional medical care. Always follow your healthcare professional's instructions.         DISCONTINUE THE FLEXERIL, AND TRY THE ZANAFLEX INSTEAD.     MAKE AN APPOINTMENT TO SEE YOUR PRIMARY CARE DOCTOR IN 2 DAYS.    IT IS IMPORTANT TO NOT DRIVE OR OPERATE HEAVY MACHINERY AFTER TAKING ANY OF THESE MEDICATIONS, AS THEY MAY MAKE YOU SLEEPY.      Make sure that you follow up with your primary care doctor in the next 2-5 days if needed .  Return to the Urgent Care if signs or symptoms change and certainly if you have worsening symptoms go to the nearest emergency department for further evaluation.             negative normal/ROM intact/normal gait/strength 5/5 bilateral upper extremities/strength 5/5 bilateral lower extremities

## 2024-03-16 DIAGNOSIS — G44.209 TENSION HEADACHE: ICD-10-CM

## 2024-03-17 RX ORDER — VERAPAMIL HYDROCHLORIDE 120 MG/1
120 CAPSULE, EXTENDED RELEASE ORAL
Qty: 90 CAPSULE | Refills: 0 | Status: SHIPPED | OUTPATIENT
Start: 2024-03-17

## 2024-03-17 NOTE — TELEPHONE ENCOUNTER
Provider Staff:  Action required for this patient     Please see care gap opportunities below in Care Due Message.    Thanks!  [facility]     Appointments      Date Provider   Last Visit   6/16/2023 Gisselle Tavera MD   Next Visit   Visit date not found Gisselle Tavera MD      Refill Decision Note   Sanjana Queen  is requesting a refill authorization.  Brief Assessment and Rationale for Refill:  Approve     Medication Therapy Plan:         Comments:     Note composed:4:46 AM 03/17/2024

## 2024-03-17 NOTE — TELEPHONE ENCOUNTER
Care Due:                  Date            Visit Type   Department     Provider  --------------------------------------------------------------------------------                                EP -                              PRIMARY      [facility] PR[facility]  Last Visit: 06-      CARE ([facility])   CARE           Gisselle Tavera  Next Visit: None Scheduled  None         None Found                                                            Last  Test          Frequency    Reason                     Performed    Due Date  --------------------------------------------------------------------------------    Office Visit  12 months..  celecoxib, linaCLOtide...  06-   06-    CBC.........  12 months..  celecoxib................  Not Found    Overdue    CMP.........  12 months..  celecoxib, rosuvastatin,   06- 05-                             valsartan................    HBA1C.......  6 months...  semaglutide..............  07- 01-    [facility] [facility] Embedded Care Due Messages. Reference number: [id number].   3/16/2024 10:20:46 PM CDT

## 2024-04-04 ENCOUNTER — PATIENT MESSAGE (OUTPATIENT)
Dept: ADMINISTRATIVE | Facility: HOSPITAL | Age: 44
End: 2024-04-04
Payer: COMMERCIAL

## 2024-05-12 DIAGNOSIS — R60.9 FLUID RETENTION: ICD-10-CM

## 2024-05-13 ENCOUNTER — PATIENT OUTREACH (OUTPATIENT)
Dept: ADMINISTRATIVE | Facility: HOSPITAL | Age: 44
End: 2024-05-13
Payer: COMMERCIAL

## 2024-05-13 RX ORDER — FUROSEMIDE 40 MG/1
TABLET ORAL
Qty: 135 TABLET | Refills: 3 | Status: SHIPPED | OUTPATIENT
Start: 2024-05-13

## 2024-05-13 NOTE — PROGRESS NOTES
Health Maintenance Due   Topic Date Due    Eye Exam  08/20/2022    COVID-19 Vaccine (4 - 2023-24 season) 09/01/2023    Diabetes Urine Screening  12/12/2023    Hemoglobin A1c  01/05/2024    Mammogram  04/17/2024     Chart review completed. HM Updated. Triggered Links. Immunizations reviewed and updated. Care Everywhere Updated. Care Team Updated.  Patient is due for annual visit with PCP and diabetic labs/eye exam. Outreached via portal.

## 2024-05-13 NOTE — TELEPHONE ENCOUNTER
Refill Routing Note   Medication(s) are not appropriate for processing by [facility] for the following reason(s):        Outside of protocol  Lasix is prescribed for as needed use; ROUTE TO PCP    [facility] action(s):  Route     Requires labs : Yes             Appointments  past 12m or future 3m with PCP    Date Provider   Last Visit   6/16/2023 Gisselle Tavera MD   Next Visit   Visit date not found Gisselle Tavera MD   ED visits in past 90 days: 0        Note composed:8:14 AM 05/13/2024

## 2024-05-13 NOTE — TELEPHONE ENCOUNTER
Care Due:                  Date            Visit Type   Department     Provider  --------------------------------------------------------------------------------                                EP -                              PRIMARY      [facility] PRIMARY  Last Visit: 06-      CARE ([facility])   CARE           Gisselle Tavera  Next Visit: None Scheduled  None         None Found                                                            Last  Test          Frequency    Reason                     Performed    Due Date  --------------------------------------------------------------------------------    Lipid Panel.  12 months..  rosuvastatin.............  12- 06-    [facility] Embedded Care Due Messages. Reference number: [id number].   5/12/2024 11:08:42 PM CDT

## 2024-05-15 DIAGNOSIS — E11.9 TYPE 2 DIABETES MELLITUS WITHOUT COMPLICATION, UNSPECIFIED WHETHER LONG TERM INSULIN USE: ICD-10-CM

## 2024-05-15 DIAGNOSIS — E11.9 TYPE 2 DIABETES MELLITUS WITHOUT COMPLICATION: ICD-10-CM

## 2025-06-04 RX ORDER — FUROSEMIDE 40 MG/1
TABLET ORAL
Qty: 135 TABLET | Refills: 3 | OUTPATIENT
Start: 2025-06-04

## 2025-07-25 ENCOUNTER — HOSPITAL ENCOUNTER (OUTPATIENT)
Dept: RADIOLOGY | Facility: HOSPITAL | Age: 45
Discharge: HOME | End: 2025-07-25
Attending: ORTHOPAEDIC SURGERY
Payer: COMMERCIAL

## 2025-07-25 ENCOUNTER — OFFICE VISIT (OUTPATIENT)
Dept: ORTHOPEDICS | Facility: CLINIC | Age: 45
End: 2025-07-25
Payer: COMMERCIAL

## 2025-07-25 VITALS — HEIGHT: 63 IN | WEIGHT: 277.69 LBS | BODY MASS INDEX: 49.2 KG/M2

## 2025-07-25 DIAGNOSIS — M25.371 RIGHT ANKLE INSTABILITY: ICD-10-CM

## 2025-07-25 DIAGNOSIS — M25.571 PAIN IN JOINT INVOLVING RIGHT ANKLE AND FOOT: ICD-10-CM

## 2025-07-25 DIAGNOSIS — R52 PAIN: ICD-10-CM

## 2025-07-25 DIAGNOSIS — M25.571 CHRONIC PAIN OF RIGHT ANKLE: ICD-10-CM

## 2025-07-25 DIAGNOSIS — G89.29 CHRONIC PAIN OF RIGHT ANKLE: ICD-10-CM

## 2025-07-25 DIAGNOSIS — M72.2 PLANTAR FASCIITIS OF RIGHT FOOT: Primary | ICD-10-CM

## 2025-07-25 PROCEDURE — 99999 PR PBB SHADOW E&M-EST. PATIENT-LVL V: CPT | Mod: PBBFAC,,, | Performed by: ORTHOPAEDIC SURGERY

## 2025-07-25 PROCEDURE — 73630 X-RAY EXAM OF FOOT: CPT | Mod: 26,RT,, | Performed by: RADIOLOGY

## 2025-07-25 PROCEDURE — 73630 X-RAY EXAM OF FOOT: CPT | Mod: TC,RT

## 2025-07-25 PROCEDURE — 73610 X-RAY EXAM OF ANKLE: CPT | Mod: 26,RT,, | Performed by: RADIOLOGY

## 2025-07-25 PROCEDURE — 73610 X-RAY EXAM OF ANKLE: CPT | Mod: TC,RT

## 2025-07-25 RX ORDER — METHYLPREDNISOLONE ACETATE 40 MG/ML
40 INJECTION, SUSPENSION INTRA-ARTICULAR; INTRALESIONAL; INTRAMUSCULAR; SOFT TISSUE
Status: COMPLETED | OUTPATIENT
Start: 2025-07-25 | End: 2025-07-25

## 2025-07-25 RX ADMIN — METHYLPREDNISOLONE ACETATE 40 MG: 40 INJECTION, SUSPENSION INTRA-ARTICULAR; INTRALESIONAL; INTRAMUSCULAR; SOFT TISSUE at 09:07

## 2025-07-25 NOTE — PROGRESS NOTES
DATE: 7/25/2025  PATIENT: Sanjana Queen    CHIEF COMPLAINT: R ankle and foot pain    HISTORY:  Sanjana Queen is a 45 y.o. female w/ PMH of plantar fasciitis of R foot and recurrent sprains of R ankle here for evaluation of R ankle and foot pain.     Patient states that she rolled her ankle again 3 weeks ago while walking. She states that she rolls her ankle about once per month. Pain is present on dorsal aspect of foot and lateral aspect of ankle, with it being worse in the morning time. Endorses occasional numbness of plantar surface of toes 3-5. Prior treatments have included NSAIDs, medrol, CSIs (most recent 6-8 months ago, has received to ankle joint and plantar fascia), at home stretches, and night splint. She has worn orthotic insoles before, but has not worn a brace before. She has no other complaints at this time.      PAST MEDICAL/SURGICAL HISTORY:  Past Medical History:   Diagnosis Date    Anemia     Asthma     Cardiomyopathy, peripartum     GERD (gastroesophageal reflux disease)     Hypertension     Hypothyroidism     not at present    Morbid obesity     Peripartum cardiomyopathy 7/28/2015    Snoring      Past Surgical History:   Procedure Laterality Date    BLADDER SURGERY      BREAST LUMPECTOMY Left 11/12/2021    BREAST SURGERY  2021    Removal of a keloid    DILATION AND CURETTAGE OF UTERUS      ENDOMETRIAL ABLATION      FRACTURE SURGERY      gastric sleeve N/A     HERNIA REPAIR  02/01/2020    Dr Luu    hernia repair 2/2020      INSERTION OF MIDURETHRAL SLING N/A 01/17/2023    Procedure: RETRO PUBIC SLING, MIDURETHRAL;  Surgeon: Sam Rice MD;  Location: [facility] OR [address] [place] [address];  Service: Urology;  Laterality: N/A;  90 min/ RETROPUBIC    spleen surgery      TONSILLECTOMY      TUBAL LIGATION         Current Medications: Current Medications[1]    Social History: Social History[2]    REVIEW OF SYSTEMS:  Constitution: Negative. Negative for chills, fever and night sweats.  "  Cardiovascular: Negative for chest pain and syncope.   Respiratory: Negative for cough and shortness of breath.   Gastrointestinal: See HPI. Negative for nausea/vomiting. Negative for abdominal pain.  Genitourinary: See HPI. Negative for discoloration or dysuria.  Skin: Negative for dry skin, itching and rash.   Hematologic/Lymphatic: Negative for bleeding problem. Does not bruise/bleed easily.   Musculoskeletal: see HPI.  Neurological: See HPI. No seizures.   Endocrine: Negative for polydipsia, polyphagia and polyuria.   Allergic/Immunologic: Negative for hives and persistent infections.    PHYSICAL EXAMINATION:    Ht 5' 3" (1.6 m)   Wt 126 kg (277 lb 10.7 oz)   BMI 49.19 kg/m²     General: The patient is a 45 y.o. female in no apparent distress, the patient is oriented to person, place and time.   Psych: Normal mood and affect  HEENT:  NCAT, sclera nonicteric  Lungs:  Respirations are equal and unlabored.  CV:  2+ bilateral upper and lower extremity pulses.  Skin:  Intact throughout.  Musculoskeletal: No pain with the range of motion of the bilateral hips. No trochanteric tenderness to palpation. No pain with range of motion about the bilateral knees.      Right Foot and Ankle Exam    INSPECTION:        Gait:    Antalgic   Scars:   None   Swelling:  Positive   Color:   Ecchymosis on dorsal aspect of foot around 1st MTP    ALIGNMENT:    Hindfoot  Normal    Midfoot: Normal  Forefoot: Normal     Collective Ankle-Hindfoot Alignment    Mild loss of arch       TENDERNESS:  LATERAL:      Sinus tarsi:  TTP    Syndesmosis:  None     ATFL:   TTP      CFL:   TTP     Anterolateral gutter: TTP    Fibula:   None   Peroneal tendons: TTP     Peroneal tubercle.  None     ANTERIOR:  Anteromedial joint line:  None   Anterolateral joint line:  None  Talonavicular:    None  Anterior tibialis:   None   Extensor tendons:   TTP     POSTERIOR:  Medial/lateral achilles:  None   Medial/lateral achilles " insertion: None    MEDIAL:      Deltoid:  None     Malleolus:  None     PTT:   None     Navicular:  None            CALCANEUS:  Retrocalcaneal:   None   Medial achilles:   None  Lateral achilles:   None  Calcaneal tuberosity:   None    FOOT:    Calcaneal cuboid  None    MT / MT heads:  TTP 1st and 4th MT   Navicular   None     Cuneiforms:   None     Web space:   None   Lisfranc    None     Tarsal tunnel:   None   Base of the fifth metatarsal  None          RANGE OF MOTION:  RIGHT/ LEFT (* = pain)     Ankle DF/PF:  15/45*  15/45         Eversion/Inversion: 15/25* 15/25     Midfoot ABD/ADD: 10/10 10/10     First MTP DF/PF:  60/25 60/25               STRENGTH: (affected) (* = pain)  Anterior tibialis: 5/5  Posterior tibialis: 5/5  Gastroc-soleus: 5/5  Peroneals:  5/5  EHL:   5/5  FHL:   5/5      NEUROLOGIC TESTING:  All dermatomes foot, ankle and leg have normal sensation light touch  Ankle Reflexes 2+, symmetric   Negative Babinski and No Clonus    VASCULAR:  2+ pulses PT/DT with brisk capillary refill toes.          IMAGING:   Radiographs of the R ankle and foot were ordered and personally reviewed with the patient today, revealing mild degenerative changes in the ankle joint, ossicle at the MM, osteophyte formation at anterior talus and tibia, heel spur, and mild loss of arch of foot.           ASSESSMENT/PLAN:    1. Plantar fasciitis of right foot  X-Ray Foot Complete Right    X-Ray Ankle Complete Right    methylPREDNISolone acetate injection 40 mg      2. Right ankle instability, multiple sprains  MRI Ankle Without Contrast Right      3. Chronic pain of right ankle  MRI Ankle Without Contrast Right      4. Pain in joint involving right ankle and foot  MRI Ankle Without Contrast Right            45 y.o. yo female with PMH of plantar fasciitis of R foot and recurrent sprains of R ankle here for evaluation of R ankle and foot pain c/f chronic plantar fasciitis and R ankle instability.    Plan: The patient and I  had a thorough discussion today.  We discussed the working diagnosis as well as several other potential alternative diagnoses.  Treatment options were discussed, including risks and benefits of each. At this time, further investigation is warranted.    At this time, the patient would like to proceed with:  - plantar fascia CSI administered today in clinic  - ankle brace  - pain control: OTC NSAIDs/tylenol as needed for pain  - MRI R ankle      Sam Jolley MD  [facility]     I have personally taken the history and examined this patient and agree with the residents note as stated above.  Patient with multifocal right foot and ankle complaints with primary problems appearing to be plantar fasciitis as well as recurrent ankle sprains and instability with chronic right ankle pain.  We had discussion about the importance of wearing good supportive shoes for her plantar fasciitis and she requested an injection for pain relief, so after verbal consent and sterile prep I injected the origin of the right plantar fascia with 2 cc lidocaine and 40 mg of methylprednisolone.  I am going to obtain an MRI scan before making any further recommendations regarding her ankle complaints.      Follow-up with results of MRI         [1]   Current Outpatient Medications:     albuterol (PROVENTIL) 2.5 mg /3 mL (0.083 %) nebulizer solution, , Disp: , Rfl:     albuterol (PROVENTIL/VENTOLIN HFA) 90 mcg/actuation inhaler, , Disp: , Rfl:     albuterol (PROVENTIL/VENTOLIN HFA) 90 mcg/actuation inhaler, 2 puffs as needed Inhalation PRN, Disp: , Rfl:     ALPRAZolam (XANAX) 0.25 MG tablet, Take 1 tablet (0.25 mg total) by mouth 2 (two) times daily as needed for Anxiety., Disp: 60 tablet, Rfl: 0    ascorbic acid, vitamin C, (VITAMIN C) 1000 MG tablet, Orally Once a day, Disp: , Rfl:     buPROPion (WELLBUTRIN XL) 300 MG 24 hr tablet, Take 1 tablet (300 mg total) by mouth once daily., Disp: 90 tablet, Rfl: 3    celecoxib (CELEBREX) 200  MG capsule, Take 1 capsule (200 mg total) by mouth 2 (two) times daily., Disp: 90 capsule, Rfl: 5    celecoxib (CELEBREX) 200 MG capsule, Take 1 capsule by mouth 2 (two) times daily., Disp: , Rfl:     ciclopirox 1 % shampoo, Apply topically twice a week., Disp: , Rfl:     clobetasoL (CLOBEX) 0.05 % shampoo, Apply topically every 7 days., Disp: , Rfl:     furosemide (LASIX) 40 MG tablet, Take 1 tablet by mouth 2 (two) times daily., Disp: , Rfl:     furosemide (LASIX) 40 MG tablet, TAKE ONE AND ONE-HALF TABLETS DAILY AS NEEDED FOR SWELLING, Disp: 135 tablet, Rfl: 3    HIZENTRA 1 gram/5 mL (20 %) Soln, Inject into the skin., Disp: , Rfl:     HIZENTRA 10 gram/50 mL (20 %) Soln, Inject 25 mg into the skin every 14 (fourteen) days. Fri, Disp: , Rfl:     ketoconazole (NIZORAL) 2 % shampoo, , Disp: , Rfl:     Lactobac no.41/Bifidobact no.7 (PROBIOTIC-10 ORAL), , Disp: , Rfl:     lifitegrast (XIIDRA) 5 % Dpet, Apply 1 drop to eye every 12 (twelve) hours., Disp: 180 each, Rfl: 4    linaCLOtide (LINZESS) 290 mcg Cap capsule, Take 1 capsule (290 mcg total) by mouth once daily., Disp: 90 capsule, Rfl: 3    mupirocin calcium 2% (BACTROBAN) 2 % cream, , Disp: , Rfl:     olopatadine (PAZEO) 0.7 % Drop, Place 1 drop into both eyes once daily., Disp: 2.5 mL, Rfl: 12    OZEMPIC 1 mg/dose (4 mg/3 mL), Inject into the skin., Disp: , Rfl:     pantoprazole (PROTONIX) 40 MG tablet, Take 1 tablet (40 mg total) by mouth once daily., Disp: 90 tablet, Rfl: 3    predniSONE (DELTASONE) 20 MG tablet, , Disp: , Rfl:     rOPINIRole (REQUIP) 0.25 MG tablet, Take by mouth., Disp: , Rfl:     rosuvastatin (CRESTOR) 20 MG tablet, Take 1 tablet (20 mg total) by mouth once daily., Disp: 90 tablet, Rfl: 3    rosuvastatin (CRESTOR) 20 MG tablet, Take 1 tablet by mouth once daily., Disp: , Rfl:     rosuvastatin (CRESTOR) 5 MG tablet, 1 tablet Orally Once a day, Disp: , Rfl:     sertraline (ZOLOFT) 100 MG tablet, Take 1.5 tablets (150 mg total) by mouth once  daily., Disp: 45 tablet, Rfl: 11    tiZANidine (ZANAFLEX) 4 MG tablet, Take 1 tablet (4 mg total) by mouth every 6 (six) hours., Disp: 90 tablet, Rfl: 3    TOVET EMOLLIENT 0.05 % topical foam, Apply topically., Disp: , Rfl:     triamcinolone acetonide 0.1% (KENALOG) 0.1 % cream, MARQUEZ AA BID, Disp: , Rfl: 0    valsartan (DIOVAN) 160 MG tablet, Take 1 tablet (160 mg total) by mouth once daily. (Patient taking differently: Take 160 mg by mouth every evening.), Disp: 90 tablet, Rfl: 3    valsartan (DIOVAN) 80 MG tablet, 1 tablet Orally Twice a day, Disp: , Rfl:     verapamiL (CALAN-SR) 120 MG CR tablet, Take 1 tablet by mouth every evening., Disp: , Rfl:     verapamiL (VERELAN) 120 MG C24P, TAKE 1 CAPSULE DAILY, Disp: 90 capsule, Rfl: 0  [2]   Social History  Socioeconomic History    Marital status:    Tobacco Use    Smoking status: Former     Current packs/day: 0.00     Average packs/day: 1 pack/day for 20.5 years (20.5 ttl pk-yrs)     Types: Cigarettes     Start date: 3/1/1998     Quit date: 2018     Years since quittin.9    Smokeless tobacco: Never   Substance and Sexual Activity    Alcohol use: No    Drug use: No    Sexual activity: Yes     Partners: Male     Birth control/protection: See Surgical Hx     Comment: Uterine Ablation and Tubes removed     Social Drivers of Health     Financial Resource Strain: Low Risk  (3/24/2025)    Received from [facility]    Overall Financial Resource Strain (CARDIA)     Difficulty of Paying Living Expenses: Not hard at all   Food Insecurity: No Food Insecurity (3/24/2025)    Received from [facility]    Hunger Vital Sign     Worried About Running Out of Food in the Last Year: Never true     Ran Out of Food in the Last Year: Never true   Transportation Needs: No Transportation Needs (3/24/2025)    Received from [facility]    PRAPARE - Transportation     Lack of Transportation (Medical): No     Lack of Transportation (Non-Medical): No   Physical Activity: Sufficiently  Active (3/24/2025)    Received from [facility]    Exercise Vital Sign     Days of Exercise per Week: 7 days     Minutes of Exercise per Session: 30 min   Stress: Stress Concern Present (3/24/2025)    Received from [facility]    [employer] - Occupational Stress Questionnaire     Feeling of Stress : Very much   Housing Stability: Unknown (2/16/2024)    Received from [facility]    Housing Stability Vital Sign     Unable to Pay for Housing in the Last Year: No     Unstable Housing in the Last Year: Patient declined

## 2025-07-31 ENCOUNTER — HOSPITAL ENCOUNTER (OUTPATIENT)
Dept: RADIOLOGY | Facility: HOSPITAL | Age: 45
Discharge: HOME | End: 2025-07-31
Attending: ORTHOPAEDIC SURGERY
Payer: COMMERCIAL

## 2025-07-31 DIAGNOSIS — M25.571 CHRONIC PAIN OF RIGHT ANKLE: ICD-10-CM

## 2025-07-31 DIAGNOSIS — G89.29 CHRONIC PAIN OF RIGHT ANKLE: ICD-10-CM

## 2025-07-31 DIAGNOSIS — M25.571 PAIN IN JOINT INVOLVING RIGHT ANKLE AND FOOT: ICD-10-CM

## 2025-07-31 DIAGNOSIS — M25.371 RIGHT ANKLE INSTABILITY: ICD-10-CM

## 2025-07-31 PROCEDURE — 73721 MRI JNT OF LWR EXTRE W/O DYE: CPT | Mod: 26,RT,, | Performed by: RADIOLOGY

## 2025-07-31 PROCEDURE — 73721 MRI JNT OF LWR EXTRE W/O DYE: CPT | Mod: TC,RT

## 2025-08-05 ENCOUNTER — OFFICE VISIT (OUTPATIENT)
Dept: ORTHOPEDICS | Facility: CLINIC | Age: 45
End: 2025-08-05
Payer: COMMERCIAL

## 2025-08-05 VITALS — BODY MASS INDEX: 49.21 KG/M2 | WEIGHT: 277.75 LBS | HEIGHT: 63 IN

## 2025-08-05 DIAGNOSIS — M72.2 PLANTAR FASCIITIS OF RIGHT FOOT: ICD-10-CM

## 2025-08-05 DIAGNOSIS — M25.371 RIGHT ANKLE INSTABILITY: Primary | ICD-10-CM

## 2025-08-05 PROCEDURE — 99213 OFFICE O/P EST LOW 20 MIN: CPT | Mod: S$GLB,,, | Performed by: ORTHOPAEDIC SURGERY

## 2025-08-05 PROCEDURE — 3008F BODY MASS INDEX DOCD: CPT | Mod: CPTII,S$GLB,, | Performed by: ORTHOPAEDIC SURGERY

## 2025-08-05 PROCEDURE — 4010F ACE/ARB THERAPY RXD/TAKEN: CPT | Mod: CPTII,S$GLB,, | Performed by: ORTHOPAEDIC SURGERY

## 2025-08-05 PROCEDURE — 99999 PR PBB SHADOW E&M-EST. PATIENT-LVL IV: CPT | Mod: PBBFAC,,, | Performed by: ORTHOPAEDIC SURGERY

## 2025-08-05 PROCEDURE — 1160F RVW MEDS BY RX/DR IN RCRD: CPT | Mod: CPTII,S$GLB,, | Performed by: ORTHOPAEDIC SURGERY

## 2025-08-05 PROCEDURE — 1159F MED LIST DOCD IN RCRD: CPT | Mod: CPTII,S$GLB,, | Performed by: ORTHOPAEDIC SURGERY

## 2025-08-05 NOTE — PROGRESS NOTES
Sanjana Queen  Here for follow-up and results of her MRI.  This is a 45-year-old female who presented to me on 07/25/2025 with multiple right foot and ankle complaints.  She reported that she has a history of ankle sprains with her last ankle sprain about three weeks prior to her initial visit.  She reported pain mainly on the anterolateral aspect of the ankle.  She also reported a history of chronic plantar fasciitis with previous t conservative treatment.  I suspected that she has right ankle instability along with the plantar fasciitis and I wanted to obtain an MRI scan before making any further recommendations.  I did inject the origin of the right plantar fascia on her last visit.    MRI results:  MRI of the right ankle performed on 07/31/2025 reveals chronic sprains of the anterolateral and deltoid ligaments, thickening of the plantar fascia consistent with plantar fasciitis as well as mild degenerative changes of the ankle and subtalar joints.    She returns today and reports that the injection has given her some relief.  She would like to proceed with surgical intervention both the ankle and the plantar fascia.    Examination:  Examination remains unchanged from her recent visit she has functional motion of her ankle and subtalar joint with minimal discomfort.  She has some tenderness over the anterolateral ligaments she does have some increased laxity of the right ankle compared to the left.    Impression:   1. Right ankle instability, multiple sprains        2. Plantar fasciitis of right foot              Recommendation:  She would like to proceed with surgery as mentioned above.  I performed a anterolateral ligament reconstruction on her left ankle many years ago and she has had no problems with the ankle since.  I explained to her that I will be retiring at the end of September and I would not be available throughout her whole postoperative course.  She understands this.  We will schedule her  for right ankle anterolateral ligament reconstruction as well as right partial plantar fasciotomy on 09/03/2025.  She will return for preoperative H&P and consent.

## 2025-08-25 ENCOUNTER — PATIENT MESSAGE (OUTPATIENT)
Dept: PREADMISSION TESTING | Facility: HOSPITAL | Age: 45
End: 2025-08-25
Payer: COMMERCIAL

## 2025-08-26 ENCOUNTER — OFFICE VISIT (OUTPATIENT)
Dept: ORTHOPEDICS | Facility: CLINIC | Age: 45
End: 2025-08-26
Payer: COMMERCIAL

## 2025-08-26 VITALS — WEIGHT: 275.56 LBS | HEIGHT: 63 IN | BODY MASS INDEX: 48.82 KG/M2

## 2025-08-26 DIAGNOSIS — M25.571 CHRONIC PAIN OF RIGHT ANKLE: ICD-10-CM

## 2025-08-26 DIAGNOSIS — G89.29 CHRONIC PAIN OF RIGHT ANKLE: ICD-10-CM

## 2025-08-26 DIAGNOSIS — M25.371 RIGHT ANKLE INSTABILITY: Primary | ICD-10-CM

## 2025-08-26 DIAGNOSIS — M72.2 PLANTAR FASCIITIS OF RIGHT FOOT: ICD-10-CM

## 2025-08-26 PROCEDURE — 99499 UNLISTED E&M SERVICE: CPT | Mod: S$GLB,,, | Performed by: PHYSICIAN ASSISTANT

## 2025-08-26 PROCEDURE — 99999 PR PBB SHADOW E&M-EST. PATIENT-LVL III: CPT | Mod: PBBFAC,,, | Performed by: PHYSICIAN ASSISTANT

## 2025-08-26 RX ORDER — GABAPENTIN 300 MG/1
300 CAPSULE ORAL NIGHTLY
Qty: 14 CAPSULE | Refills: 0 | Status: SHIPPED | OUTPATIENT
Start: 2025-08-26

## 2025-08-26 RX ORDER — OXYCODONE HYDROCHLORIDE 5 MG/1
5 TABLET ORAL EVERY 6 HOURS PRN
Qty: 12 TABLET | Refills: 0 | Status: SHIPPED | OUTPATIENT
Start: 2025-08-26

## 2025-08-26 RX ORDER — METHOCARBAMOL 750 MG/1
1500 TABLET, FILM COATED ORAL EVERY 8 HOURS PRN
Qty: 20 TABLET | Refills: 0 | Status: SHIPPED | OUTPATIENT
Start: 2025-08-26

## 2025-09-02 ENCOUNTER — TELEPHONE (OUTPATIENT)
Dept: ORTHOPEDICS | Facility: CLINIC | Age: 45
End: 2025-09-02
Payer: COMMERCIAL

## 2025-09-02 ENCOUNTER — ANESTHESIA EVENT (OUTPATIENT)
Dept: SURGERY | Facility: HOSPITAL | Age: 45
End: 2025-09-02
Payer: COMMERCIAL

## 2025-09-03 ENCOUNTER — HOSPITAL ENCOUNTER (OUTPATIENT)
Facility: HOSPITAL | Age: 45
Discharge: HOME | End: 2025-09-03
Attending: ORTHOPAEDIC SURGERY | Admitting: ORTHOPAEDIC SURGERY
Payer: COMMERCIAL

## 2025-09-03 ENCOUNTER — ANESTHESIA (OUTPATIENT)
Dept: SURGERY | Facility: HOSPITAL | Age: 45
End: 2025-09-03
Payer: COMMERCIAL

## 2025-09-03 VITALS
DIASTOLIC BLOOD PRESSURE: 75 MMHG | RESPIRATION RATE: 24 BRPM | SYSTOLIC BLOOD PRESSURE: 109 MMHG | HEIGHT: 63 IN | OXYGEN SATURATION: 100 % | HEART RATE: 60 BPM | BODY MASS INDEX: 48.73 KG/M2 | TEMPERATURE: 98 F | WEIGHT: 275 LBS

## 2025-09-03 DIAGNOSIS — M72.2 PLANTAR FASCIITIS OF RIGHT FOOT: ICD-10-CM

## 2025-09-03 DIAGNOSIS — M25.371 RIGHT ANKLE INSTABILITY: ICD-10-CM

## 2025-09-03 LAB
B-HCG UR QL: NEGATIVE
CTP QC/QA: YES
POCT GLUCOSE: 96 MG/DL (ref 70–110)

## 2025-09-03 PROCEDURE — 63600175 PHARM REV CODE 636 W HCPCS: Performed by: STUDENT IN AN ORGANIZED HEALTH CARE EDUCATION/TRAINING PROGRAM

## 2025-09-03 PROCEDURE — 82962 GLUCOSE BLOOD TEST: CPT | Performed by: ORTHOPAEDIC SURGERY

## 2025-09-03 PROCEDURE — 25000003 PHARM REV CODE 250: Performed by: NURSE ANESTHETIST, CERTIFIED REGISTERED

## 2025-09-03 PROCEDURE — 71000033 HC RECOVERY, INTIAL HOUR: Performed by: ORTHOPAEDIC SURGERY

## 2025-09-03 PROCEDURE — 36000709 HC OR TIME LEV III EA ADD 15 MIN: Performed by: ORTHOPAEDIC SURGERY

## 2025-09-03 PROCEDURE — 27698 REPAIR OF ANKLE LIGAMENT: CPT | Mod: RT,,, | Performed by: ORTHOPAEDIC SURGERY

## 2025-09-03 PROCEDURE — 64447 NJX AA&/STRD FEMORAL NRV IMG: CPT | Performed by: STUDENT IN AN ORGANIZED HEALTH CARE EDUCATION/TRAINING PROGRAM

## 2025-09-03 PROCEDURE — 25000003 PHARM REV CODE 250: Performed by: STUDENT IN AN ORGANIZED HEALTH CARE EDUCATION/TRAINING PROGRAM

## 2025-09-03 PROCEDURE — 94761 N-INVAS EAR/PLS OXIMETRY MLT: CPT

## 2025-09-03 PROCEDURE — 99900035 HC TECH TIME PER 15 MIN (STAT)

## 2025-09-03 PROCEDURE — 36000708 HC OR TIME LEV III 1ST 15 MIN: Performed by: ORTHOPAEDIC SURGERY

## 2025-09-03 PROCEDURE — 64445 NJX AA&/STRD SCIATIC NRV IMG: CPT | Performed by: STUDENT IN AN ORGANIZED HEALTH CARE EDUCATION/TRAINING PROGRAM

## 2025-09-03 PROCEDURE — 37000009 HC ANESTHESIA EA ADD 15 MINS: Performed by: ORTHOPAEDIC SURGERY

## 2025-09-03 PROCEDURE — 63600175 PHARM REV CODE 636 W HCPCS: Performed by: NURSE ANESTHETIST, CERTIFIED REGISTERED

## 2025-09-03 PROCEDURE — C1713 ANCHOR/SCREW BN/BN,TIS/BN: HCPCS | Performed by: ORTHOPAEDIC SURGERY

## 2025-09-03 PROCEDURE — 28008 INCISION OF FOOT FASCIA: CPT | Mod: 51,RT,, | Performed by: ORTHOPAEDIC SURGERY

## 2025-09-03 PROCEDURE — 71000015 HC POSTOP RECOV 1ST HR: Performed by: ORTHOPAEDIC SURGERY

## 2025-09-03 PROCEDURE — 37000008 HC ANESTHESIA 1ST 15 MINUTES: Performed by: ORTHOPAEDIC SURGERY

## 2025-09-03 DEVICE — KIT INTERNALBRACE  JUMPSTART D: Type: IMPLANTABLE DEVICE | Site: FOOT | Status: FUNCTIONAL

## 2025-09-03 RX ORDER — FAMOTIDINE 40 MG/5ML
POWDER, FOR SUSPENSION ORAL
Status: DISCONTINUED | OUTPATIENT
Start: 2025-09-03 | End: 2025-09-03

## 2025-09-03 RX ORDER — MIDAZOLAM HYDROCHLORIDE 1 MG/ML
.5-4 INJECTION, SOLUTION INTRAMUSCULAR; INTRAVENOUS
Status: DISCONTINUED | OUTPATIENT
Start: 2025-09-03 | End: 2025-09-03 | Stop reason: HOSPADM

## 2025-09-03 RX ORDER — DEXAMETHASONE SODIUM PHOSPHATE 4 MG/ML
INJECTION, SOLUTION INTRA-ARTICULAR; INTRALESIONAL; INTRAMUSCULAR; INTRAVENOUS; SOFT TISSUE
Status: DISCONTINUED | OUTPATIENT
Start: 2025-09-03 | End: 2025-09-03

## 2025-09-03 RX ORDER — ACETAMINOPHEN 500 MG
1000 TABLET ORAL
Status: COMPLETED | OUTPATIENT
Start: 2025-09-03 | End: 2025-09-03

## 2025-09-03 RX ORDER — SODIUM CHLORIDE 0.9 % (FLUSH) 0.9 %
10 SYRINGE (ML) INJECTION
Status: DISCONTINUED | OUTPATIENT
Start: 2025-09-03 | End: 2025-09-03 | Stop reason: HOSPADM

## 2025-09-03 RX ORDER — METFORMIN HYDROCHLORIDE 500 MG/1
500 TABLET ORAL DAILY
COMMUNITY

## 2025-09-03 RX ORDER — ONDANSETRON HYDROCHLORIDE 2 MG/ML
4 INJECTION, SOLUTION INTRAVENOUS DAILY PRN
Status: DISCONTINUED | OUTPATIENT
Start: 2025-09-03 | End: 2025-09-03 | Stop reason: HOSPADM

## 2025-09-03 RX ORDER — CELECOXIB 200 MG/1
400 CAPSULE ORAL
Status: COMPLETED | OUTPATIENT
Start: 2025-09-03 | End: 2025-09-03

## 2025-09-03 RX ORDER — MIDAZOLAM HYDROCHLORIDE 1 MG/ML
INJECTION INTRAMUSCULAR; INTRAVENOUS
Status: DISCONTINUED | OUTPATIENT
Start: 2025-09-03 | End: 2025-09-03

## 2025-09-03 RX ORDER — ROPIVACAINE HYDROCHLORIDE 5 MG/ML
INJECTION, SOLUTION EPIDURAL; INFILTRATION; PERINEURAL
Status: COMPLETED | OUTPATIENT
Start: 2025-09-03 | End: 2025-09-03

## 2025-09-03 RX ORDER — LIDOCAINE HYDROCHLORIDE 10 MG/ML
INJECTION, SOLUTION INTRAVENOUS
Status: DISCONTINUED | OUTPATIENT
Start: 2025-09-03 | End: 2025-09-03

## 2025-09-03 RX ORDER — IMMUNE GLOBULIN SUBCUTANEOUS (HUMAN) 200 MG/ML
25 INJECTION, SOLUTION SUBCUTANEOUS
COMMUNITY

## 2025-09-03 RX ORDER — OXYCODONE HYDROCHLORIDE 5 MG/1
5 TABLET ORAL
Status: DISCONTINUED | OUTPATIENT
Start: 2025-09-03 | End: 2025-09-03 | Stop reason: HOSPADM

## 2025-09-03 RX ORDER — FENTANYL CITRATE 50 UG/ML
25-200 INJECTION, SOLUTION INTRAMUSCULAR; INTRAVENOUS
Status: DISCONTINUED | OUTPATIENT
Start: 2025-09-03 | End: 2025-09-03 | Stop reason: HOSPADM

## 2025-09-03 RX ORDER — GLUCAGON 1 MG
1 KIT INJECTION
Status: DISCONTINUED | OUTPATIENT
Start: 2025-09-03 | End: 2025-09-03 | Stop reason: HOSPADM

## 2025-09-03 RX ORDER — PROPOFOL 10 MG/ML
VIAL (ML) INTRAVENOUS CONTINUOUS PRN
Status: DISCONTINUED | OUTPATIENT
Start: 2025-09-03 | End: 2025-09-03

## 2025-09-03 RX ORDER — ROCURONIUM BROMIDE 10 MG/ML
INJECTION, SOLUTION INTRAVENOUS
Status: DISCONTINUED | OUTPATIENT
Start: 2025-09-03 | End: 2025-09-03

## 2025-09-03 RX ORDER — CEFAZOLIN SODIUM 1 G/3ML
INJECTION, POWDER, FOR SOLUTION INTRAMUSCULAR; INTRAVENOUS
Status: DISCONTINUED | OUTPATIENT
Start: 2025-09-03 | End: 2025-09-03

## 2025-09-03 RX ORDER — CEFAZOLIN 2 G/1
2 INJECTION, POWDER, FOR SOLUTION INTRAMUSCULAR; INTRAVENOUS
Status: DISCONTINUED | OUTPATIENT
Start: 2025-09-03 | End: 2025-09-03 | Stop reason: HOSPADM

## 2025-09-03 RX ORDER — MUPIROCIN 20 MG/G
OINTMENT TOPICAL 2 TIMES DAILY
Status: DISCONTINUED | OUTPATIENT
Start: 2025-09-03 | End: 2025-09-03 | Stop reason: HOSPADM

## 2025-09-03 RX ORDER — ONDANSETRON HYDROCHLORIDE 2 MG/ML
INJECTION, SOLUTION INTRAVENOUS
Status: DISCONTINUED | OUTPATIENT
Start: 2025-09-03 | End: 2025-09-03

## 2025-09-03 RX ORDER — DEXMEDETOMIDINE HYDROCHLORIDE 100 UG/ML
INJECTION, SOLUTION INTRAVENOUS
Status: DISCONTINUED | OUTPATIENT
Start: 2025-09-03 | End: 2025-09-03

## 2025-09-03 RX ORDER — PROPOFOL 10 MG/ML
VIAL (ML) INTRAVENOUS
Status: DISCONTINUED | OUTPATIENT
Start: 2025-09-03 | End: 2025-09-03

## 2025-09-03 RX ORDER — FENTANYL CITRATE 50 UG/ML
INJECTION, SOLUTION INTRAMUSCULAR; INTRAVENOUS
Status: DISCONTINUED | OUTPATIENT
Start: 2025-09-03 | End: 2025-09-03

## 2025-09-03 RX ORDER — HALOPERIDOL LACTATE 5 MG/ML
0.5 INJECTION, SOLUTION INTRAMUSCULAR EVERY 10 MIN PRN
Status: DISCONTINUED | OUTPATIENT
Start: 2025-09-03 | End: 2025-09-03 | Stop reason: HOSPADM

## 2025-09-03 RX ORDER — TIRZEPATIDE 15 MG/.5ML
15 INJECTION, SOLUTION SUBCUTANEOUS
COMMUNITY

## 2025-09-03 RX ORDER — NEOSTIGMINE METHYLSULFATE 0.5 MG/ML
INJECTION INTRAVENOUS
Status: DISCONTINUED | OUTPATIENT
Start: 2025-09-03 | End: 2025-09-03

## 2025-09-03 RX ORDER — SUCCINYLCHOLINE CHLORIDE 20 MG/ML
INJECTION INTRAMUSCULAR; INTRAVENOUS
Status: DISCONTINUED | OUTPATIENT
Start: 2025-09-03 | End: 2025-09-03

## 2025-09-03 RX ADMIN — CELECOXIB 400 MG: 200 CAPSULE ORAL at 07:09

## 2025-09-03 RX ADMIN — CEFAZOLIN 3 G: 330 INJECTION, POWDER, FOR SOLUTION INTRAMUSCULAR; INTRAVENOUS at 08:09

## 2025-09-03 RX ADMIN — PROPOFOL 100 MCG/KG/MIN: 10 INJECTION, EMULSION INTRAVENOUS at 08:09

## 2025-09-03 RX ADMIN — ACETAMINOPHEN 1000 MG: 500 TABLET ORAL at 07:09

## 2025-09-03 RX ADMIN — GLYCOPYRROLATE 0.4 MG: 0.2 INJECTION, SOLUTION INTRAMUSCULAR; INTRAVENOUS at 10:09

## 2025-09-03 RX ADMIN — FENTANYL CITRATE 50 MCG: 50 INJECTION, SOLUTION INTRAMUSCULAR; INTRAVENOUS at 08:09

## 2025-09-03 RX ADMIN — SUCCINYLCHOLINE CHLORIDE 140 MG: 20 INJECTION, SOLUTION INTRAMUSCULAR; INTRAVENOUS; PARENTERAL at 08:09

## 2025-09-03 RX ADMIN — FAMOTIDINE 20 MG: 40 POWDER, FOR SUSPENSION ORAL at 08:09

## 2025-09-03 RX ADMIN — MIDAZOLAM HYDROCHLORIDE 2 MG: 1 INJECTION, SOLUTION INTRAMUSCULAR; INTRAVENOUS at 07:09

## 2025-09-03 RX ADMIN — PROPOFOL 200 MG: 10 INJECTION, EMULSION INTRAVENOUS at 08:09

## 2025-09-03 RX ADMIN — ONDANSETRON 4 MG: 2 INJECTION INTRAMUSCULAR; INTRAVENOUS at 08:09

## 2025-09-03 RX ADMIN — FENTANYL CITRATE 100 MCG: 50 INJECTION INTRAMUSCULAR; INTRAVENOUS at 07:09

## 2025-09-03 RX ADMIN — ROPIVACAINE HYDROCHLORIDE 20 ML: 5 INJECTION, SOLUTION EPIDURAL; INFILTRATION; PERINEURAL at 08:09

## 2025-09-03 RX ADMIN — NEOSTIGMINE METHYLSULFATE 4 MG: 0.5 INJECTION INTRAVENOUS at 10:09

## 2025-09-03 RX ADMIN — HALOPERIDOL LACTATE 0.5 MG: 5 INJECTION, SOLUTION INTRAMUSCULAR at 10:09

## 2025-09-03 RX ADMIN — DEXAMETHASONE SODIUM PHOSPHATE 8 MG: 4 INJECTION, SOLUTION INTRAMUSCULAR; INTRAVENOUS at 08:09

## 2025-09-03 RX ADMIN — DEXMEDETOMIDINE 20 MCG: 100 INJECTION, SOLUTION, CONCENTRATE INTRAVENOUS at 08:09

## 2025-09-03 RX ADMIN — MIDAZOLAM HYDROCHLORIDE 2 MG: 1 INJECTION, SOLUTION INTRAMUSCULAR; INTRAVENOUS at 08:09

## 2025-09-03 RX ADMIN — SODIUM CHLORIDE: 9 INJECTION, SOLUTION INTRAVENOUS at 08:09

## 2025-09-03 RX ADMIN — LIDOCAINE HYDROCHLORIDE 100 MG: 10 INJECTION, SOLUTION INTRAVENOUS at 08:09

## 2025-09-03 RX ADMIN — ROPIVACAINE HYDROCHLORIDE 30 ML: 5 INJECTION, SOLUTION EPIDURAL; INFILTRATION; PERINEURAL at 08:09

## 2025-09-03 RX ADMIN — ROCURONIUM BROMIDE 20 MG: 10 INJECTION, SOLUTION INTRAVENOUS at 08:09

## (undated) DEVICE — TOURNIQUET SB QC DP 18X4IN

## (undated) DEVICE — MANIFOLD 4 PORT

## (undated) DEVICE — SPONGE COTTON TRAY 4X4IN

## (undated) DEVICE — GAUZE CNFRM STRL 4INX4.1YD

## (undated) DEVICE — SPONGE DERMACEA GAUZE 4X4

## (undated) DEVICE — HOSE DUAL W/CPC CONNECTORS

## (undated) DEVICE — DRAPE UINDERBUT GRAD PCH

## (undated) DEVICE — SUT VICRYL CTD 2-0 GI 27 SH

## (undated) DEVICE — ADHESIVE DERMABOND ADVANCED

## (undated) DEVICE — DRAPE C-ARM MINI DISP

## (undated) DEVICE — SPONGE GAUZE 16PLY 4X4

## (undated) DEVICE — DRESSING XEROFORM NONADH 1X8IN

## (undated) DEVICE — GLOVE BIOGEL ORTHOPEDIC 7.5

## (undated) DEVICE — COVER CAMERA OPERATING ROOM

## (undated) DEVICE — IMPLANTABLE DEVICE
Type: IMPLANTABLE DEVICE | Site: FOOT | Status: NON-FUNCTIONAL
Removed: 2025-09-03

## (undated) DEVICE — SUT ETHILON 3/0 18IN PS-1

## (undated) DEVICE — BANDAGE ADHESIVE PLAS STRL 1X3

## (undated) DEVICE — DRAPE STERI INSTRUMENT 1018

## (undated) DEVICE — SUT 2-0 VICRYL / SH (J417)

## (undated) DEVICE — CLIPPER BLADE MOD 4406 (CAREF)

## (undated) DEVICE — RETRACTOR STERILE PLASTIC G2

## (undated) DEVICE — SOL NS 1000CC

## (undated) DEVICE — Device

## (undated) DEVICE — ELECTRODE REM PLYHSV RETURN 9

## (undated) DEVICE — PAD CAST SPECIALIST STRL 4

## (undated) DEVICE — SUT MONOCRYL 3-0 RB1

## (undated) DEVICE — SUT VICRYL PLUS 0 CT1 18IN

## (undated) DEVICE — STOCKINETTE TUBULAR 2PL 6 X 4

## (undated) DEVICE — DRAPE T EXTRM SURG 121X128X90

## (undated) DEVICE — PACK LAPSCP/PELVSCPY III TIBRN

## (undated) DEVICE — TOWEL OR DISP STRL BLUE 4/PK

## (undated) DEVICE — COVER LIGHT HANDLE 80/CA

## (undated) DEVICE — TRAY MINOR ORTHO OMC

## (undated) DEVICE — TRAY MINOR GEN SURG OMC

## (undated) DEVICE — LUBRICANT SURGILUBE 2 OZ

## (undated) DEVICE — NDL HYPO REG 25G X 1 1/2

## (undated) DEVICE — SET IRR URLGY 2LINE UNIV SPIKE

## (undated) DEVICE — DRAPE LAP T SHT W/ INSTR PAD

## (undated) DEVICE — TRAY CATH FOL SIL URIMTR 16FR

## (undated) DEVICE — MARCAINE EPI INJ SDV .25% 10ML

## (undated) DEVICE — SUT MONOCRYL 3-0 PS-2 UND

## (undated) DEVICE — HOOK STAY ELAS 5MM 8EA/PK

## (undated) DEVICE — UNDERGLOVES BIOGEL PI SIZE 8

## (undated) DEVICE — BANDAGE ESMARK ELASTIC ST 4X9